# Patient Record
Sex: FEMALE | Race: WHITE | NOT HISPANIC OR LATINO | Employment: UNEMPLOYED | URBAN - METROPOLITAN AREA
[De-identification: names, ages, dates, MRNs, and addresses within clinical notes are randomized per-mention and may not be internally consistent; named-entity substitution may affect disease eponyms.]

---

## 2017-02-18 ENCOUNTER — OFFICE VISIT (OUTPATIENT)
Dept: URGENT CARE | Facility: CLINIC | Age: 62
End: 2017-02-18
Payer: COMMERCIAL

## 2017-02-18 PROCEDURE — 99213 OFFICE O/P EST LOW 20 MIN: CPT

## 2017-06-05 ENCOUNTER — ALLSCRIPTS OFFICE VISIT (OUTPATIENT)
Dept: OTHER | Facility: OTHER | Age: 62
End: 2017-06-05

## 2017-06-05 DIAGNOSIS — Z12.31 ENCOUNTER FOR SCREENING MAMMOGRAM FOR MALIGNANT NEOPLASM OF BREAST: ICD-10-CM

## 2017-06-05 DIAGNOSIS — I10 ESSENTIAL (PRIMARY) HYPERTENSION: ICD-10-CM

## 2018-01-14 VITALS
SYSTOLIC BLOOD PRESSURE: 140 MMHG | HEIGHT: 65 IN | HEART RATE: 90 BPM | WEIGHT: 258 LBS | BODY MASS INDEX: 42.99 KG/M2 | DIASTOLIC BLOOD PRESSURE: 80 MMHG

## 2018-01-18 NOTE — MISCELLANEOUS
Message  Return to work or school:   Ronaldo Griffith is under my professional care  She was seen in my office on 3/3/16   She is able to return to work on  In 1-2 days              Future Appointments    Signatures   Electronically signed by : Carl Pedraza MD; Mar  3 2016  1:31PM EST                       (Author)

## 2018-04-05 ENCOUNTER — HOSPITAL ENCOUNTER (INPATIENT)
Facility: HOSPITAL | Age: 63
LOS: 1 days | DRG: 281 | End: 2018-04-06
Attending: EMERGENCY MEDICINE | Admitting: HOSPITALIST
Payer: COMMERCIAL

## 2018-04-05 ENCOUNTER — OFFICE VISIT (OUTPATIENT)
Dept: URGENT CARE | Facility: CLINIC | Age: 63
End: 2018-04-05
Payer: COMMERCIAL

## 2018-04-05 ENCOUNTER — APPOINTMENT (EMERGENCY)
Dept: RADIOLOGY | Facility: HOSPITAL | Age: 63
DRG: 281 | End: 2018-04-05
Payer: COMMERCIAL

## 2018-04-05 VITALS
WEIGHT: 253 LBS | RESPIRATION RATE: 18 BRPM | BODY MASS INDEX: 42.15 KG/M2 | OXYGEN SATURATION: 98 % | SYSTOLIC BLOOD PRESSURE: 132 MMHG | HEART RATE: 68 BPM | TEMPERATURE: 97 F | HEIGHT: 65 IN | DIASTOLIC BLOOD PRESSURE: 78 MMHG

## 2018-04-05 DIAGNOSIS — R07.9 CHEST PAIN, UNSPECIFIED TYPE: Primary | ICD-10-CM

## 2018-04-05 DIAGNOSIS — I24.9 ACUTE CORONARY SYNDROME (HCC): Primary | ICD-10-CM

## 2018-04-05 DIAGNOSIS — I21.4 NSTEMI (NON-ST ELEVATED MYOCARDIAL INFARCTION) (HCC): ICD-10-CM

## 2018-04-05 PROBLEM — E66.01 MORBID OBESITY DUE TO EXCESS CALORIES (HCC): Status: ACTIVE | Noted: 2018-04-05

## 2018-04-05 PROBLEM — F41.9 ANXIETY: Status: ACTIVE | Noted: 2018-04-05

## 2018-04-05 PROBLEM — I10 ESSENTIAL HYPERTENSION: Status: ACTIVE | Noted: 2018-04-05

## 2018-04-05 LAB
ALBUMIN SERPL BCP-MCNC: 3.6 G/DL (ref 3.5–5)
ALP SERPL-CCNC: 87 U/L (ref 46–116)
ALT SERPL W P-5'-P-CCNC: 24 U/L (ref 12–78)
ANION GAP SERPL CALCULATED.3IONS-SCNC: 7 MMOL/L (ref 4–13)
APTT PPP: 31 SECONDS (ref 23–35)
AST SERPL W P-5'-P-CCNC: 17 U/L (ref 5–45)
ATRIAL RATE: 68 BPM
ATRIAL RATE: 74 BPM
BASOPHILS # BLD AUTO: 0.02 THOUSANDS/ΜL (ref 0–0.1)
BASOPHILS NFR BLD AUTO: 0 % (ref 0–1)
BILIRUB SERPL-MCNC: 0.3 MG/DL (ref 0.2–1)
BUN SERPL-MCNC: 18 MG/DL (ref 5–25)
CALCIUM SERPL-MCNC: 8.7 MG/DL (ref 8.3–10.1)
CHLORIDE SERPL-SCNC: 103 MMOL/L (ref 100–108)
CO2 SERPL-SCNC: 31 MMOL/L (ref 21–32)
CREAT SERPL-MCNC: 0.93 MG/DL (ref 0.6–1.3)
EOSINOPHIL # BLD AUTO: 0.09 THOUSAND/ΜL (ref 0–0.61)
EOSINOPHIL NFR BLD AUTO: 1 % (ref 0–6)
ERYTHROCYTE [DISTWIDTH] IN BLOOD BY AUTOMATED COUNT: 13.1 % (ref 11.6–15.1)
GFR SERPL CREATININE-BSD FRML MDRD: 67 ML/MIN/1.73SQ M
GLUCOSE SERPL-MCNC: 170 MG/DL (ref 65–140)
HCT VFR BLD AUTO: 42 % (ref 34.8–46.1)
HGB BLD-MCNC: 14.4 G/DL (ref 11.5–15.4)
INR PPP: 0.97 (ref 0.86–1.16)
LYMPHOCYTES # BLD AUTO: 0.79 THOUSANDS/ΜL (ref 0.6–4.47)
LYMPHOCYTES NFR BLD AUTO: 6 % (ref 14–44)
MCH RBC QN AUTO: 29.8 PG (ref 26.8–34.3)
MCHC RBC AUTO-ENTMCNC: 34.3 G/DL (ref 31.4–37.4)
MCV RBC AUTO: 87 FL (ref 82–98)
MONOCYTES # BLD AUTO: 0.59 THOUSAND/ΜL (ref 0.17–1.22)
MONOCYTES NFR BLD AUTO: 5 % (ref 4–12)
NEUTROPHILS # BLD AUTO: 11.58 THOUSANDS/ΜL (ref 1.85–7.62)
NEUTS SEG NFR BLD AUTO: 88 % (ref 43–75)
P AXIS: 29 DEGREES
P AXIS: 53 DEGREES
PLATELET # BLD AUTO: 200 THOUSANDS/UL (ref 149–390)
PMV BLD AUTO: 10.4 FL (ref 8.9–12.7)
POTASSIUM SERPL-SCNC: 3.6 MMOL/L (ref 3.5–5.3)
PR INTERVAL: 144 MS
PR INTERVAL: 150 MS
PROT SERPL-MCNC: 7.3 G/DL (ref 6.4–8.2)
PROTHROMBIN TIME: 12.7 SECONDS (ref 12.1–14.4)
QRS AXIS: -17 DEGREES
QRS AXIS: -17 DEGREES
QRSD INTERVAL: 88 MS
QRSD INTERVAL: 96 MS
QT INTERVAL: 394 MS
QT INTERVAL: 410 MS
QTC INTERVAL: 435 MS
QTC INTERVAL: 437 MS
RBC # BLD AUTO: 4.84 MILLION/UL (ref 3.81–5.12)
SODIUM SERPL-SCNC: 141 MMOL/L (ref 136–145)
T WAVE AXIS: 45 DEGREES
T WAVE AXIS: 6 DEGREES
TROPONIN I SERPL-MCNC: 0.04 NG/ML
TROPONIN I SERPL-MCNC: 1.01 NG/ML
TROPONIN I SERPL-MCNC: 3.33 NG/ML
TROPONIN I SERPL-MCNC: 4.95 NG/ML
VENTRICULAR RATE: 68 BPM
VENTRICULAR RATE: 74 BPM
WBC # BLD AUTO: 13.07 THOUSAND/UL (ref 4.31–10.16)

## 2018-04-05 PROCEDURE — 99213 OFFICE O/P EST LOW 20 MIN: CPT | Performed by: FAMILY MEDICINE

## 2018-04-05 PROCEDURE — 85610 PROTHROMBIN TIME: CPT | Performed by: EMERGENCY MEDICINE

## 2018-04-05 PROCEDURE — 96375 TX/PRO/DX INJ NEW DRUG ADDON: CPT

## 2018-04-05 PROCEDURE — 99223 1ST HOSP IP/OBS HIGH 75: CPT | Performed by: HOSPITALIST

## 2018-04-05 PROCEDURE — 85730 THROMBOPLASTIN TIME PARTIAL: CPT | Performed by: HOSPITALIST

## 2018-04-05 PROCEDURE — 84484 ASSAY OF TROPONIN QUANT: CPT | Performed by: HOSPITALIST

## 2018-04-05 PROCEDURE — 36415 COLL VENOUS BLD VENIPUNCTURE: CPT

## 2018-04-05 PROCEDURE — 84484 ASSAY OF TROPONIN QUANT: CPT

## 2018-04-05 PROCEDURE — 85730 THROMBOPLASTIN TIME PARTIAL: CPT | Performed by: EMERGENCY MEDICINE

## 2018-04-05 PROCEDURE — 71046 X-RAY EXAM CHEST 2 VIEWS: CPT

## 2018-04-05 PROCEDURE — 80053 COMPREHEN METABOLIC PANEL: CPT

## 2018-04-05 PROCEDURE — 93005 ELECTROCARDIOGRAM TRACING: CPT | Performed by: FAMILY MEDICINE

## 2018-04-05 PROCEDURE — 99285 EMERGENCY DEPT VISIT HI MDM: CPT

## 2018-04-05 PROCEDURE — 84484 ASSAY OF TROPONIN QUANT: CPT | Performed by: EMERGENCY MEDICINE

## 2018-04-05 PROCEDURE — 96374 THER/PROPH/DIAG INJ IV PUSH: CPT

## 2018-04-05 PROCEDURE — 85025 COMPLETE CBC W/AUTO DIFF WBC: CPT

## 2018-04-05 PROCEDURE — 93005 ELECTROCARDIOGRAM TRACING: CPT

## 2018-04-05 PROCEDURE — 93005 ELECTROCARDIOGRAM TRACING: CPT | Performed by: HOSPITALIST

## 2018-04-05 RX ORDER — TRAMADOL HYDROCHLORIDE 50 MG/1
50 TABLET ORAL EVERY 6 HOURS PRN
COMMUNITY
End: 2021-04-14

## 2018-04-05 RX ORDER — DULOXETIN HYDROCHLORIDE 20 MG/1
80 CAPSULE, DELAYED RELEASE ORAL DAILY
COMMUNITY
Start: 2018-03-24

## 2018-04-05 RX ORDER — SODIUM CHLORIDE 9 MG/ML
75 INJECTION, SOLUTION INTRAVENOUS CONTINUOUS
Status: DISCONTINUED | OUTPATIENT
Start: 2018-04-05 | End: 2018-04-06 | Stop reason: HOSPADM

## 2018-04-05 RX ORDER — METOPROLOL SUCCINATE 100 MG/1
100 TABLET, EXTENDED RELEASE ORAL DAILY
COMMUNITY
Start: 2018-03-24

## 2018-04-05 RX ORDER — HEPARIN SODIUM 10000 [USP'U]/100ML
11.1 INJECTION, SOLUTION INTRAVENOUS
Status: DISCONTINUED | OUTPATIENT
Start: 2018-04-05 | End: 2018-04-06 | Stop reason: HOSPADM

## 2018-04-05 RX ORDER — METOPROLOL SUCCINATE 50 MG/1
100 TABLET, EXTENDED RELEASE ORAL DAILY
Status: DISCONTINUED | OUTPATIENT
Start: 2018-04-06 | End: 2018-04-06 | Stop reason: HOSPADM

## 2018-04-05 RX ORDER — ASPIRIN 81 MG/1
324 TABLET, CHEWABLE ORAL ONCE
Status: COMPLETED | OUTPATIENT
Start: 2018-04-05 | End: 2018-04-05

## 2018-04-05 RX ORDER — ATORVASTATIN CALCIUM 40 MG/1
40 TABLET, FILM COATED ORAL EVERY EVENING
Status: DISCONTINUED | OUTPATIENT
Start: 2018-04-05 | End: 2018-04-06 | Stop reason: HOSPADM

## 2018-04-05 RX ORDER — HEPARIN SODIUM 1000 [USP'U]/ML
2000 INJECTION, SOLUTION INTRAVENOUS; SUBCUTANEOUS AS NEEDED
Status: DISCONTINUED | OUTPATIENT
Start: 2018-04-05 | End: 2018-04-06 | Stop reason: HOSPADM

## 2018-04-05 RX ORDER — DULOXETIN HYDROCHLORIDE 20 MG/1
20 CAPSULE, DELAYED RELEASE ORAL DAILY
Status: DISCONTINUED | OUTPATIENT
Start: 2018-04-06 | End: 2018-04-06 | Stop reason: HOSPADM

## 2018-04-05 RX ORDER — ONDANSETRON 2 MG/ML
4 INJECTION INTRAMUSCULAR; INTRAVENOUS ONCE
Status: COMPLETED | OUTPATIENT
Start: 2018-04-06 | End: 2018-04-06

## 2018-04-05 RX ORDER — HEPARIN SODIUM 1000 [USP'U]/ML
4000 INJECTION, SOLUTION INTRAVENOUS; SUBCUTANEOUS ONCE
Status: COMPLETED | OUTPATIENT
Start: 2018-04-05 | End: 2018-04-05

## 2018-04-05 RX ORDER — MORPHINE SULFATE 4 MG/ML
4 INJECTION, SOLUTION INTRAMUSCULAR; INTRAVENOUS EVERY 4 HOURS PRN
Status: DISCONTINUED | OUTPATIENT
Start: 2018-04-05 | End: 2018-04-06 | Stop reason: HOSPADM

## 2018-04-05 RX ORDER — KETOROLAC TROMETHAMINE 30 MG/ML
30 INJECTION, SOLUTION INTRAMUSCULAR; INTRAVENOUS ONCE
Status: COMPLETED | OUTPATIENT
Start: 2018-04-05 | End: 2018-04-05

## 2018-04-05 RX ORDER — PANTOPRAZOLE SODIUM 20 MG/1
20 TABLET, DELAYED RELEASE ORAL
Status: DISCONTINUED | OUTPATIENT
Start: 2018-04-06 | End: 2018-04-06 | Stop reason: HOSPADM

## 2018-04-05 RX ORDER — LISINOPRIL AND HYDROCHLOROTHIAZIDE 25; 20 MG/1; MG/1
1 TABLET ORAL DAILY
COMMUNITY
Start: 2018-03-24 | End: 2018-04-07 | Stop reason: HOSPADM

## 2018-04-05 RX ORDER — OXYCODONE HYDROCHLORIDE 5 MG/1
5 TABLET ORAL EVERY 4 HOURS PRN
Status: DISCONTINUED | OUTPATIENT
Start: 2018-04-05 | End: 2018-04-06 | Stop reason: HOSPADM

## 2018-04-05 RX ORDER — MAGNESIUM HYDROXIDE/ALUMINUM HYDROXICE/SIMETHICONE 120; 1200; 1200 MG/30ML; MG/30ML; MG/30ML
30 SUSPENSION ORAL ONCE
Status: COMPLETED | OUTPATIENT
Start: 2018-04-05 | End: 2018-04-05

## 2018-04-05 RX ORDER — HEPARIN SODIUM 1000 [USP'U]/ML
4000 INJECTION, SOLUTION INTRAVENOUS; SUBCUTANEOUS AS NEEDED
Status: DISCONTINUED | OUTPATIENT
Start: 2018-04-05 | End: 2018-04-06 | Stop reason: HOSPADM

## 2018-04-05 RX ORDER — OMEPRAZOLE 20 MG/1
1 TABLET, DELAYED RELEASE ORAL DAILY
COMMUNITY

## 2018-04-05 RX ORDER — ASPIRIN 325 MG
325 TABLET ORAL ONCE
Status: DISCONTINUED | OUTPATIENT
Start: 2018-04-05 | End: 2018-04-06 | Stop reason: HOSPADM

## 2018-04-05 RX ORDER — ACETAMINOPHEN 325 MG/1
650 TABLET ORAL EVERY 4 HOURS PRN
Status: DISCONTINUED | OUTPATIENT
Start: 2018-04-05 | End: 2018-04-06 | Stop reason: HOSPADM

## 2018-04-05 RX ADMIN — ASPIRIN 81 MG 324 MG: 81 TABLET ORAL at 16:56

## 2018-04-05 RX ADMIN — ALUMINUM HYDROXIDE, MAGNESIUM HYDROXIDE, AND SIMETHICONE 30 ML: 200; 200; 20 SUSPENSION ORAL at 14:08

## 2018-04-05 RX ADMIN — NITROGLYCERIN 0.5 INCH: 20 OINTMENT TOPICAL at 16:57

## 2018-04-05 RX ADMIN — LIDOCAINE HYDROCHLORIDE 10 ML: 20 SOLUTION ORAL; TOPICAL at 14:08

## 2018-04-05 RX ADMIN — KETOROLAC TROMETHAMINE 30 MG: 30 INJECTION, SOLUTION INTRAMUSCULAR; INTRAVENOUS at 12:53

## 2018-04-05 RX ADMIN — SODIUM CHLORIDE 75 ML/HR: 0.9 INJECTION, SOLUTION INTRAVENOUS at 18:46

## 2018-04-05 RX ADMIN — FAMOTIDINE 20 MG: 10 INJECTION, SOLUTION INTRAVENOUS at 14:07

## 2018-04-05 RX ADMIN — HEPARIN SODIUM 4000 UNITS: 1000 INJECTION, SOLUTION INTRAVENOUS; SUBCUTANEOUS at 16:57

## 2018-04-05 RX ADMIN — HEPARIN SODIUM AND DEXTROSE 11.1 UNITS/KG/HR: 10000; 5 INJECTION INTRAVENOUS at 16:58

## 2018-04-05 RX ADMIN — ACETAMINOPHEN 650 MG: 325 TABLET ORAL at 21:21

## 2018-04-05 RX ADMIN — ATORVASTATIN CALCIUM 40 MG: 40 TABLET, FILM COATED ORAL at 18:14

## 2018-04-05 NOTE — ED NOTES
Patient states her pain has not changed, #8 on scale   [address] aware       Oscar Sandra, AJAY  04/05/18 6697

## 2018-04-05 NOTE — H&P
H&P- Nathaly Ni 1955, 65 y o  female MRN: [id number]    Unit/Bed#: ED 04 B Encounter: [id number]    Primary Care Provider: Anais Rios DO   Date and time admitted to hospital: 4/5/2018 12:10 PM        Anxiety   Assessment & Plan    -continue Cymbalta        Morbid obesity due to excess calories ([address] )   Assessment & Plan    -encourage weight loss        Essential hypertension   Assessment & Plan    -continue lisinopril, HCTZ, Lopressor        * NSTEMI (non-ST elevated myocardial infarction) (HCC)   Assessment & Plan    -continue aspirin started in the ER  -continue beta-blocker  -start statin  -continue to trend troponin  -monitor on telemetry  -continue heparin infusion that is being started in the ER  -consult Cardiology          VTE Prophylaxis: Heparin Drip  / reason for no mechanical VTE prophylaxis On heparin drip   Code Status:  Full  POLST: POLST is not applicable to this patient  Discussion with family:   at bedside    Anticipated Length of Stay:  Patient will be admitted on an Inpatient basis with an anticipated length of stay of  > 2 midnights  Justification for Hospital Stay:  NSTEMI    Total Time for Visit, including Counseling / Coordination of Care: 30 minutes  Greater than 50% of this total time spent on direct patient counseling and coordination of care  Chief Complaint:   Chest pain    History of Present Illness:    Nathaly Ni is a 65 y o  female who presents with a chief complaint of chest pain  Earlier this morning the patient was outside with her dog  She came in and sat down on the sofa began feeling retrosternal sharp constant chest pain that radiated to her right chest   She said the pain persisted without interruption until she received Maalox and viscous lidocaine in the ER  She denies any associated shortness of breath, nausea, vomiting    She does report having some diaphoresis at home but also felt that the heat was turned up to high in her house  Patient denies palpitations, cough, diarrhea, abdominal pain, fevers, chills, night sweats, headache, visual disturbances, neck stiffness, new focal neurologic deficit, rash, dysuria, heat or cold intolerance, significant weight gain or loss  Review of Systems:    Review of Systems   All other systems reviewed and are negative  Past Medical and Surgical History:     Past Medical History:   Diagnosis Date    Anxiety     GERD (gastroesophageal reflux disease)     Hypertension        Past Surgical History:   Procedure Laterality Date    BACK SURGERY      HYSTERECTOMY         Meds/Allergies:    Prior to Admission medications    Medication Sig Start Date End Date Taking? Authorizing Provider   DULoxetine (CYMBALTA) 20 mg capsule  3/24/18  Yes Historical Provider, MD   lisinopril-hydrochlorothiazide (PRINZIDE,ZESTORETIC) 20-25 MG per tablet  3/24/18  Yes Historical Provider, MD   metoprolol succinate (TOPROL-XL) 100 mg 24 hr tablet  3/24/18  Yes Historical Provider, MD   omeprazole (PRILOSEC OTC) 20 MG tablet Take 1 tablet by mouth daily   Yes Historical Provider, MD     I have reviewed home medications with patient personally      Allergies: No Known Allergies    Social History:     Marital Status: /Civil Union   Substance Use History:   History   Alcohol Use No     History   Smoking Status    Never Smoker   Smokeless Tobacco    Never Used     History   Drug Use No       Family History:    non-contributory    Physical Exam:     Vitals:   Blood Pressure: 141/65 (04/05/18 1630)  Pulse: 63 (04/05/18 1630)  Temperature: (!) 96 8 °F (36 °C) (04/05/18 1202)  Temp Source: Temporal (04/05/18 1202)  Respirations: 20 (04/05/18 1630)  Weight - Scale: 115 kg (253 lb) (04/05/18 1202)  SpO2: 96 % (04/05/18 1630)    Physical Exam    Gen: NAD, AAOx3, well developed, well nourished  Eyes: EOMI, PERRLA, no scleral icterus  ENMT:  Oropharynx clear of erythema or exudates, no nasal discharge, no otic discharge, moist mucous membranes  Neck:  Supple  Lymph:  No anterior or posterior cervical or supraclavicular lymphadenopathy  Cardiovascular:  Regular rate and rhythm, normal S1-S2, no murmurs, rubs, or gallops  Lungs:  Clear to auscultation bilaterally, no wheezes, or rales, or rhonchi  Abdomen:  Positive bowel sounds, soft, nontender, nondistended, no palpable organomegaly   Skin:  Intact, no obvious lesions or rashes, no edema  Neuro: Cranial nerves 2-12 are intact, non-focal, 5/5 strength in all 4 extremities      Additional Data:     Lab Results: I have personally reviewed pertinent reports  Results from last 7 days  Lab Units 04/05/18  1214   WBC Thousand/uL 13 07*   HEMOGLOBIN g/dL 14 4   HEMATOCRIT % 42 0   PLATELETS Thousands/uL 200   NEUTROS PCT % 88*   LYMPHS PCT % 6*   MONOS PCT % 5   EOS PCT % 1       Results from last 7 days  Lab Units 04/05/18  1214   SODIUM mmol/L 141   POTASSIUM mmol/L 3 6   CHLORIDE mmol/L 103   CO2 mmol/L 31   BUN mg/dL 18   CREATININE mg/dL 0 93   CALCIUM mg/dL 8 7   TOTAL PROTEIN g/dL 7 3   BILIRUBIN TOTAL mg/dL 0 30   ALK PHOS U/L 87   ALT U/L 24   AST U/L 17   GLUCOSE RANDOM mg/dL 170*           Imaging: I have personally reviewed pertinent reports  X-ray chest 2 views   ED Interpretation by Juan Gudino DO (04/05 1237)   See below      Final Result by Christopher Jean MD (04/05 1232)      No acute cardiopulmonary disease  Workstation performed: [id number]             EKG, Pathology, and Other Studies Reviewed on Admission:   · EKG:  Sinus rhythm at a rate of 65  Left axis deviation  No ST changes  T-wave flattening in lead III, T-wave inversions in leads V1 through V3  Allscripts / Epic Records Reviewed: Yes     ** Please Note: This note has been constructed using a voice recognition system   **

## 2018-04-05 NOTE — ED PROVIDER NOTES
History  Chief Complaint   Patient presents with    Chest Pain     pt stated that the chest pain started at 10am this morning, pt stated that she had just brought her dog in and it started, no SOB     Playing with dog outside this am and noted chest pain / pressure  Felt mild dyspnea, nausea and was sweaty  Came inside to rest and pain eventually resolved  Lasted about 30-45 minutes total   occas cp since then  No known hx of cad   _+hx of htn  +FH of early CAD  Denies recent illness, no f/c/ no cough or congestion  Appetite normal,no le pain or swelling  No h xof dvt/pe  No recent travel, surg, immob         History provided by:  Patient and spouse   used: No    Chest Pain   Pain location:  Substernal area  Pain quality: pressure    Pain radiates to:  Does not radiate  Pain radiates to the back: no    Pain severity:  Moderate  Onset quality:  Sudden  Duration:  45 minutes  Timing:  Intermittent  Progression:  Resolved  Chronicity:  New  Context: not at rest    Relieved by:  Nothing  Worsened by:  Nothing tried  Ineffective treatments:  None tried  Associated symptoms: diaphoresis, nausea and shortness of breath    Associated symptoms: no abdominal pain, no anorexia, no back pain, no cough, no dizziness, no fatigue, no fever, no headache, no lower extremity edema, no numbness, no palpitations and not vomiting    Risk factors: hypertension and obesity    Risk factors: no prior DVT/PE        Prior to Admission Medications   Prescriptions Last Dose Informant Patient Reported? Taking?    DULoxetine (CYMBALTA) 20 mg capsule 4/5/2018 at Unknown time  Yes Yes   Sig: daily     metoprolol succinate (TOPROL-XL) 100 mg 24 hr tablet 4/5/2018 at Unknown time  Yes Yes   omeprazole (PRILOSEC OTC) 20 MG tablet 4/5/2018 at Unknown time  Yes Yes   Sig: Take 1 tablet by mouth daily   traMADol (ULTRAM) 50 mg tablet Unknown at Unknown time  Yes No   Sig: Take 50 mg by mouth every 6 (six) hours as needed for moderate pain (for migrain pain)      Facility-Administered Medications: None       Past Medical History:   Diagnosis Date    Anxiety     GERD (gastroesophageal reflux disease)     Hypertension     Migraine        Past Surgical History:   Procedure Laterality Date    BACK SURGERY      HYSTERECTOMY      LUMBAR DISCECTOMY         History reviewed  No pertinent family history  I have reviewed and agree with the history as documented  Social History   Substance Use Topics    Smoking status: Never Smoker    Smokeless tobacco: Never Used    Alcohol use Yes      Comment: occassionally        Review of Systems   Constitutional: Positive for diaphoresis  Negative for fatigue and fever  Respiratory: Positive for shortness of breath  Negative for cough  Cardiovascular: Positive for chest pain  Negative for palpitations  Gastrointestinal: Positive for nausea  Negative for abdominal pain, anorexia and vomiting  Musculoskeletal: Negative for back pain  Neurological: Negative for dizziness, numbness and headaches  All other systems reviewed and are negative  Physical Exam  ED Triage Vitals   Temperature Pulse Respirations Blood Pressure SpO2   04/05/18 1202 04/05/18 1202 04/05/18 1202 04/05/18 1202 04/05/18 1202   (!) 96 8 °F (36 °C) 73 16 (!) 186/91 98 %      Temp Source Heart Rate Source Patient Position - Orthostatic VS BP Location FiO2 (%)   04/05/18 1202 04/05/18 1202 04/05/18 1202 04/05/18 1202 --   Temporal Monitor Lying Right arm       Pain Score       04/05/18 1200       9           Orthostatic Vital Signs  Vitals:    04/05/18 1715 04/05/18 1734 04/05/18 2339 04/06/18 0829   BP: 148/77 160/95 114/62 116/59   Pulse: 69 75 73 72   Patient Position - Orthostatic VS: Lying  Lying Lying       Physical Exam   Constitutional: She appears well-developed and well-nourished  Morbidly obese w/ BMI 42 5   HENT:   Nose: Nose normal    Eyes: Conjunctivae are normal    Neck: Neck supple  Cardiovascular: Normal rate and regular rhythm  No murmur heard  Pulmonary/Chest: Effort normal and breath sounds normal  No respiratory distress  Abdominal: Soft  Musculoskeletal: She exhibits no edema, tenderness or deformity  Neurological: She is alert  Skin: Skin is warm  Psychiatric: She has a normal mood and affect  Nursing note and vitals reviewed        ED Medications  Medications   ketorolac (TORADOL) injection 30 mg (30 mg Intravenous Given 4/5/18 1253)   famotidine (PEPCID) injection 20 mg (20 mg Intravenous Given 4/5/18 1407)   aluminum-magnesium hydroxide-simethicone (MYLANTA) 200-200-20 mg/5 mL oral suspension 30 mL (30 mL Oral Given 4/5/18 1408)   lidocaine viscous (XYLOCAINE) 2 % mucosal solution 10 mL (10 mL Swish & Spit Given 4/5/18 1408)   aspirin chewable tablet 324 mg (324 mg Oral Given 4/5/18 1656)   nitroglycerin (NITRO-BID) 2 % TD ointment 0 5 inch (0 5 inches Topical Given 4/5/18 1657)   heparin (porcine) injection 4,000 Units (4,000 Units Intravenous Given 4/5/18 1657)   ondansetron (ZOFRAN) injection 4 mg (4 mg Intravenous Given 4/6/18 0903)       Diagnostic Studies  Results Reviewed     Procedure Component Value Units Date/Time    APTT six (6) hours after Heparin bolus/drip initiation or dosing change [[id number]]  (Normal) Collected:  04/05/18 1658    Lab Status:  Final result Specimen:  Blood from Arm, Right Updated:  04/05/18 1750     PTT 31 seconds     Narrative:       SLIGHT HEMOLYSIS    Therapeutic Heparin Range = 60-90 seconds    Protime-INR [[id number]]  (Normal) Collected:  04/05/18 1658    Lab Status:  Final result Specimen:  Blood from Arm, Right Updated:  04/05/18 1750     Protime 12 7 seconds      INR 0 97    Narrative:       SLIGHT HEMOLYSIS    Troponin I [[id number]]  (Abnormal) Collected:  04/05/18 1539    Lab Status:  Final result Specimen:  Blood from Arm, Left Updated:  04/05/18 1620     Troponin I 1 01 (H) ng/mL     Narrative:         [employer] analyzer 99% cutoff is > 0 04 ng/mL in network labs    o cTnI 99% cutoff is useful only when applied to patients in the clinical setting of myocardial ischemia  o cTnI 99% cutoff should be interpreted in the context of clinical history, ECG findings and possibly cardiac imaging to establish correct diagnosis  o cTnI 99% cutoff may be suggestive but clearly not indicative of a coronary event without the clinical setting of myocardial ischemia  Troponin I [[id number]]  (Normal) Collected:  04/05/18 1214    Lab Status:  Final result Specimen:  Blood from Arm, Right Updated:  04/05/18 1251     Troponin I 0 04 ng/mL     Narrative:         [employer] Chemistry analyzer 99% cutoff is > 0 04 ng/mL in network labs    o cTnI 99% cutoff is useful only when applied to patients in the clinical setting of myocardial ischemia  o cTnI 99% cutoff should be interpreted in the context of clinical history, ECG findings and possibly cardiac imaging to establish correct diagnosis  o cTnI 99% cutoff may be suggestive but clearly not indicative of a coronary event without the clinical setting of myocardial ischemia  Comprehensive metabolic panel [[id number]]  (Abnormal) Collected:  04/05/18 1214    Lab Status:  Final result Specimen:  Blood from Arm, Right Updated:  04/05/18 1248     Sodium 141 mmol/L      Potassium 3 6 mmol/L      Chloride 103 mmol/L      CO2 31 mmol/L      Anion Gap 7 mmol/L      BUN 18 mg/dL      Creatinine 0 93 mg/dL      Glucose 170 (H) mg/dL      Calcium 8 7 mg/dL      AST 17 U/L      ALT 24 U/L      Alkaline Phosphatase 87 U/L      Total Protein 7 3 g/dL      Albumin 3 6 g/dL      Total Bilirubin 0 30 mg/dL      eGFR 67 ml/min/1 73sq m     Narrative:         National Kidney Disease Education Program recommendations are as follows:  GFR calculation is accurate only with a steady state creatinine  Chronic Kidney disease less than 60 ml/min/1 73 sq  meters  Kidney failure less than 15 ml/min/1 73 sq  meters      CBC and differential [[id number]]  (Abnormal) Collected:  04/05/18 1214    Lab Status:  Final result Specimen:  Blood from Arm, Right Updated:  04/05/18 1228     WBC 13 07 (H) Thousand/uL      RBC 4 84 Million/uL      Hemoglobin 14 4 g/dL      Hematocrit 42 0 %      MCV 87 fL      MCH 29 8 pg      MCHC 34 3 g/dL      RDW 13 1 %      MPV 10 4 fL      Platelets 395 Thousands/uL      Neutrophils Relative 88 (H) %      Lymphocytes Relative 6 (L) %      Monocytes Relative 5 %      Eosinophils Relative 1 %      Basophils Relative 0 %      Neutrophils Absolute 11 58 (H) Thousands/µL      Lymphocytes Absolute 0 79 Thousands/µL      Monocytes Absolute 0 59 Thousand/µL      Eosinophils Absolute 0 09 Thousand/µL      Basophils Absolute 0 02 Thousands/µL                  X-ray chest 2 views   ED Interpretation by Romy Hutton DO (04/05 1237)   See below      Final Result by Elodia Slaughter MD (04/05 1232)      No acute cardiopulmonary disease              Workstation performed: [id number]                    Procedures  ECG 12 Lead Documentation  Date/Time: 4/5/2018 12:05 PM  Performed by: Govind Deluca by: Tyesha Chen     ECG reviewed by me, the ED Provider: yes    Patient location:  ED  Previous ECG:     Previous ECG:  Unavailable  Interpretation:     Interpretation: normal    Rate:     ECG rate:  74    ECG rate assessment: normal    Rhythm:     Rhythm: sinus rhythm    Ectopy:     Ectopy: none    QRS:     QRS axis:  Left  ST segments:     ST segments:  Non-specific  T waves:     T waves: non-specific      ECG 12 Lead Documentation  Date/Time: 4/5/2018 3:42 PM  Performed by: Tyesha Chen  Authorized by: Tyesha Chen     ECG reviewed by me, the ED Provider: yes    Patient location:  ED  Interpretation:     Interpretation: normal    Rate:     ECG rate:  65    ECG rate assessment: normal    Rhythm:     Rhythm: sinus rhythm    Ectopy:     Ectopy: none    QRS:     QRS axis:  Left  ST segments:     ST segments:  Non-specific  T waves:     T waves: non-specific             Phone Contacts  ED Phone Contact    ED Course  ED Course as of Apr 08 0909   Thu Apr 05, 2018   1332 HEART score 3   Will hold for 2nd troponin at [address] Most Recent Value   History  0 Filed at: 04/05/2018 1319   ECG  1 Filed at: 04/05/2018 1319   Age  1 Filed at: 04/05/2018 1319   Risk Factors  1 Filed at: 04/05/2018 1319   Troponin  0 Filed at: 04/05/2018 1319   Heart Score Risk Calculator   History  0 Filed at: 04/05/2018 1319   ECG  1 Filed at: 04/05/2018 1319   Age  1 Filed at: 04/05/2018 1319   Risk Factors  1 Filed at: 04/05/2018 1319   Troponin  0 Filed at: 04/05/2018 1319   HEART Score  3 Filed at: 04/05/2018 1319   HEART Score  3 Filed at: 04/05/2018 1319                            MDM  Number of Diagnoses or Management Options  Acute coronary syndrome [facility]): new and requires workup     Amount and/or Complexity of Data Reviewed  Clinical lab tests: reviewed and ordered  Tests in the radiology section of CPT®: reviewed and ordered  Tests in the medicine section of CPT®: ordered and reviewed  Obtain history from someone other than the patient: yes  Independent visualization of images, tracings, or specimens: yes      CritCare Time    Disposition  Final diagnoses:   Acute coronary syndrome ([facility] )     Time reflects when diagnosis was documented in both MDM as applicable and the Disposition within this note     Time User Action Codes Description Comment    4/5/2018  4:39 PM Maggie Nicole Add [I24 9] Acute coronary syndrome ([facility] )     4/5/2018  5:21 PM [address] M Add [I21 4] NSTEMI (non-ST elevated myocardial infarction) ([facility] )     4/5/2018  5:21 PM [address] M Modify [I21 4] NSTEMI (non-ST elevated myocardial infarction) [facility])       ED Disposition     ED Disposition Condition Comment    Admit  Case was discussed with Dr Triny David and the patient's admission status was agreed to be Admission Status: inpatient status to the service of Dr Rj Gonzalez   Follow-up Information     Follow up With Specialties Details Why Cha Adrian MD Cardiology Follow up the office will call for an appt  [address] [place] [address]          Discharge Medication List as of 4/6/2018  2:37 PM      CONTINUE these medications which have NOT CHANGED    Details   DULoxetine (CYMBALTA) 20 mg capsule daily  , Starting Sat 3/24/2018, Historical Med      metoprolol succinate (TOPROL-XL) 100 mg 24 hr tablet Starting Sat 3/24/2018, Historical Med      omeprazole (PRILOSEC OTC) 20 MG tablet Take 1 tablet by mouth daily, Historical Med      lisinopril-hydrochlorothiazide (PRINZIDE,ZESTORETIC) 20-25 MG per tablet Take 1 tablet by mouth daily  , Starting Sat 3/24/2018, Historical Med      traMADol (ULTRAM) 50 mg tablet Take 50 mg by mouth every 6 (six) hours as needed for moderate pain (for migrain pain), Historical Med           No discharge procedures on file      ED Provider  Electronically Signed by           Daria Romo DO  04/08/18 6377

## 2018-04-05 NOTE — ED NOTES
Patient states her pain is all gone and is sitting up in bed smiling  States "you saved my life" "I feel great"       Otoniel Yen RN  04/05/18 9778

## 2018-04-05 NOTE — PROGRESS NOTES
Assessment/Plan:      Diagnoses and all orders for this visit:    Chest pain, unspecified type  -     POCT ECG    Other orders  -     lisinopril-hydrochlorothiazide (PRINZIDE,ZESTORETIC) 20-25 MG per tablet;   -     metoprolol succinate (TOPROL-XL) 100 mg 24 hr tablet;   -     omeprazole (PRILOSEC OTC) 20 MG tablet; Take 1 tablet by mouth daily  -     DULoxetine (CYMBALTA) 20 mg capsule;           Subjective:     Patient ID: Maryellen Albarran is a 65 y o  female  Patient is a 27-year-old female nonsmoker  She is hypertensive lisinopril, and Metoprolol  Father  at the age of 79 of a heart attack  She denies any problems with cholesterol  Early this morning she began to complain of some tightness in the center of her chest   She has got some discomfort in her back arms which she blames on gardening this last weekend  The discomfort has been fairly persistent  She is not short breath  She cares for her elderly mother who has Alzheimer's  This is an obvious cause of stress  An EKG done here today fail to reveal any acute changes  She does have poor R-wave progression to be 3  However this is likely breast artifact  Review of Systems   Respiratory: Positive for chest tightness  All other systems reviewed and are negative  Objective:     Physical Exam   Constitutional: She is oriented to person, place, and time  She appears well-developed and well-nourished  HENT:   Head: Normocephalic and atraumatic  Eyes: Conjunctivae and EOM are normal    Neck: Normal range of motion  Cardiovascular: Normal rate, regular rhythm and normal heart sounds  Pulmonary/Chest: Effort normal and breath sounds normal    Neurological: She is alert and oriented to person, place, and time  Skin: Skin is warm  Psychiatric: She has a normal mood and affect

## 2018-04-05 NOTE — PATIENT INSTRUCTIONS
As we discussed, please get to the emergency room now for further evaluation  A normal EKG does not rule out of a cardiac issue

## 2018-04-05 NOTE — ASSESSMENT & PLAN NOTE
-continue aspirin started in the ER  -continue beta-blocker  -start statin  -continue to trend troponin  -monitor on telemetry  -continue heparin infusion that is being started in the ER  -consult Cardiology

## 2018-04-06 ENCOUNTER — APPOINTMENT (OUTPATIENT)
Dept: NON INVASIVE DIAGNOSTICS | Facility: HOSPITAL | Age: 63
DRG: 281 | End: 2018-04-06
Attending: INTERNAL MEDICINE
Payer: COMMERCIAL

## 2018-04-06 ENCOUNTER — HOSPITAL ENCOUNTER (INPATIENT)
Facility: HOSPITAL | Age: 63
LOS: 1 days | Discharge: HOME | DRG: 281 | End: 2018-04-07
Attending: INTERNAL MEDICINE | Admitting: INTERNAL MEDICINE
Payer: COMMERCIAL

## 2018-04-06 ENCOUNTER — APPOINTMENT (INPATIENT)
Dept: NON INVASIVE DIAGNOSTICS | Facility: HOSPITAL | Age: 63
DRG: 281 | End: 2018-04-06
Payer: COMMERCIAL

## 2018-04-06 VITALS
TEMPERATURE: 98.2 F | BODY MASS INDEX: 42.79 KG/M2 | OXYGEN SATURATION: 96 % | HEIGHT: 65 IN | WEIGHT: 256.84 LBS | HEART RATE: 72 BPM | DIASTOLIC BLOOD PRESSURE: 59 MMHG | RESPIRATION RATE: 18 BRPM | SYSTOLIC BLOOD PRESSURE: 116 MMHG

## 2018-04-06 DIAGNOSIS — I21.4 NSTEMI (NON-ST ELEVATED MYOCARDIAL INFARCTION) (HCC): ICD-10-CM

## 2018-04-06 LAB
ALBUMIN SERPL BCP-MCNC: 3.3 G/DL (ref 3.5–5)
ALP SERPL-CCNC: 81 U/L (ref 46–116)
ALT SERPL W P-5'-P-CCNC: 27 U/L (ref 12–78)
ANION GAP SERPL CALCULATED.3IONS-SCNC: 10 MMOL/L (ref 4–13)
APTT PPP: 49 SECONDS (ref 23–35)
APTT PPP: 53 SECONDS (ref 23–35)
APTT PPP: 60 SECONDS (ref 23–35)
AST SERPL W P-5'-P-CCNC: 72 U/L (ref 5–45)
ATRIAL RATE: 63 BPM
ATRIAL RATE: 65 BPM
ATRIAL RATE: 70 BPM
ATRIAL RATE: 73 BPM
BILIRUB SERPL-MCNC: 0.6 MG/DL (ref 0.2–1)
BUN SERPL-MCNC: 18 MG/DL (ref 5–25)
CALCIUM SERPL-MCNC: 8.8 MG/DL (ref 8.3–10.1)
CHLORIDE SERPL-SCNC: 104 MMOL/L (ref 100–108)
CHOLEST SERPL-MCNC: 211 MG/DL (ref 50–200)
CO2 SERPL-SCNC: 26 MMOL/L (ref 21–32)
CREAT SERPL-MCNC: 0.88 MG/DL (ref 0.6–1.3)
GFR SERPL CREATININE-BSD FRML MDRD: 71 ML/MIN/1.73SQ M
GLUCOSE SERPL-MCNC: 135 MG/DL (ref 65–140)
HDLC SERPL-MCNC: 47 MG/DL (ref 40–60)
LDLC SERPL CALC-MCNC: 127 MG/DL (ref 0–100)
NONHDLC SERPL-MCNC: 164 MG/DL
P AXIS: 15 DEGREES
P AXIS: 23 DEGREES
P AXIS: 35 DEGREES
POTASSIUM SERPL-SCNC: 3.7 MMOL/L (ref 3.5–5.3)
PR INTERVAL: 146 MS
PR INTERVAL: 146 MS
PR INTERVAL: 148 MS
PR INTERVAL: 154 MS
PROT SERPL-MCNC: 6.5 G/DL (ref 6.4–8.2)
QRS AXIS: -26 DEGREES
QRS AXIS: -29 DEGREES
QRS AXIS: -32 DEGREES
QRS AXIS: 204 DEGREES
QRSD INTERVAL: 78 MS
QRSD INTERVAL: 84 MS
QRSD INTERVAL: 86 MS
QRSD INTERVAL: 90 MS
QT INTERVAL: 412 MS
QT INTERVAL: 418 MS
QT INTERVAL: 424 MS
QT INTERVAL: 430 MS
QTC INTERVAL: 440 MS
QTC INTERVAL: 440 MS
QTC INTERVAL: 451 MS
QTC INTERVAL: 453 MS
SODIUM SERPL-SCNC: 140 MMOL/L (ref 136–145)
T WAVE AXIS: 147 DEGREES
T WAVE AXIS: 22 DEGREES
T WAVE AXIS: 29 DEGREES
T WAVE AXIS: 59 DEGREES
TRIGL SERPL-MCNC: 183 MG/DL
TROPONIN I SERPL-MCNC: 6.31 NG/ML
TROPONIN I SERPL-MCNC: 7.4 NG/ML
VENTRICULAR RATE: 63 BPM
VENTRICULAR RATE: 65 BPM
VENTRICULAR RATE: 70 BPM
VENTRICULAR RATE: 73 BPM

## 2018-04-06 PROCEDURE — 99239 HOSP IP/OBS DSCHRG MGMT >30: CPT | Performed by: HOSPITALIST

## 2018-04-06 PROCEDURE — 80053 COMPREHEN METABOLIC PANEL: CPT | Performed by: HOSPITALIST

## 2018-04-06 PROCEDURE — 84484 ASSAY OF TROPONIN QUANT: CPT | Performed by: HOSPITALIST

## 2018-04-06 PROCEDURE — 4A023N7 MEASUREMENT OF CARDIAC SAMPLING AND PRESSURE, LEFT HEART, PERCUTANEOUS APPROACH: ICD-10-PCS | Performed by: INTERNAL MEDICINE

## 2018-04-06 PROCEDURE — 99255 IP/OBS CONSLTJ NEW/EST HI 80: CPT | Performed by: INTERNAL MEDICINE

## 2018-04-06 PROCEDURE — C1769 GUIDE WIRE: HCPCS | Performed by: NURSE PRACTITIONER

## 2018-04-06 PROCEDURE — B2111ZZ FLUOROSCOPY OF MULTIPLE CORONARY ARTERIES USING LOW OSMOLAR CONTRAST: ICD-10-PCS | Performed by: INTERNAL MEDICINE

## 2018-04-06 PROCEDURE — B2151ZZ FLUOROSCOPY OF LEFT HEART USING LOW OSMOLAR CONTRAST: ICD-10-PCS | Performed by: INTERNAL MEDICINE

## 2018-04-06 PROCEDURE — 99152 MOD SED SAME PHYS/QHP 5/>YRS: CPT | Performed by: NURSE PRACTITIONER

## 2018-04-06 PROCEDURE — 93458 L HRT ARTERY/VENTRICLE ANGIO: CPT | Performed by: NURSE PRACTITIONER

## 2018-04-06 PROCEDURE — 93458 L HRT ARTERY/VENTRICLE ANGIO: CPT | Performed by: INTERNAL MEDICINE

## 2018-04-06 PROCEDURE — 85730 THROMBOPLASTIN TIME PARTIAL: CPT | Performed by: HOSPITALIST

## 2018-04-06 PROCEDURE — 99152 MOD SED SAME PHYS/QHP 5/>YRS: CPT | Performed by: INTERNAL MEDICINE

## 2018-04-06 PROCEDURE — 80061 LIPID PANEL: CPT | Performed by: INTERNAL MEDICINE

## 2018-04-06 PROCEDURE — C1894 INTRO/SHEATH, NON-LASER: HCPCS | Performed by: NURSE PRACTITIONER

## 2018-04-06 RX ORDER — LISINOPRIL 5 MG/1
5 TABLET ORAL DAILY
Status: DISCONTINUED | OUTPATIENT
Start: 2018-04-06 | End: 2018-04-07

## 2018-04-06 RX ORDER — MORPHINE SULFATE 4 MG/ML
4 INJECTION, SOLUTION INTRAMUSCULAR; INTRAVENOUS EVERY 4 HOURS PRN
Status: DISCONTINUED | OUTPATIENT
Start: 2018-04-06 | End: 2018-04-07 | Stop reason: HOSPADM

## 2018-04-06 RX ORDER — DULOXETIN HYDROCHLORIDE 20 MG/1
20 CAPSULE, DELAYED RELEASE ORAL DAILY
Status: CANCELLED | OUTPATIENT
Start: 2018-04-07

## 2018-04-06 RX ORDER — OXYCODONE HYDROCHLORIDE 5 MG/1
5 TABLET ORAL EVERY 4 HOURS PRN
Status: DISCONTINUED | OUTPATIENT
Start: 2018-04-06 | End: 2018-04-07 | Stop reason: HOSPADM

## 2018-04-06 RX ORDER — FENTANYL CITRATE 50 UG/ML
INJECTION, SOLUTION INTRAMUSCULAR; INTRAVENOUS CODE/TRAUMA/SEDATION MEDICATION
Status: COMPLETED | OUTPATIENT
Start: 2018-04-06 | End: 2018-04-06

## 2018-04-06 RX ORDER — HEPARIN SODIUM 1000 [USP'U]/ML
4000 INJECTION, SOLUTION INTRAVENOUS; SUBCUTANEOUS AS NEEDED
Status: CANCELLED | OUTPATIENT
Start: 2018-04-06

## 2018-04-06 RX ORDER — PANTOPRAZOLE SODIUM 20 MG/1
20 TABLET, DELAYED RELEASE ORAL
Status: DISCONTINUED | OUTPATIENT
Start: 2018-04-07 | End: 2018-04-07 | Stop reason: HOSPADM

## 2018-04-06 RX ORDER — ATORVASTATIN CALCIUM 40 MG/1
40 TABLET, FILM COATED ORAL EVERY EVENING
Status: DISCONTINUED | OUTPATIENT
Start: 2018-04-06 | End: 2018-04-07 | Stop reason: HOSPADM

## 2018-04-06 RX ORDER — ONDANSETRON 4 MG/1
4 TABLET, ORALLY DISINTEGRATING ORAL EVERY 6 HOURS PRN
Status: DISCONTINUED | OUTPATIENT
Start: 2018-04-06 | End: 2018-04-07 | Stop reason: HOSPADM

## 2018-04-06 RX ORDER — HEPARIN SODIUM 10000 [USP'U]/100ML
11.1 INJECTION, SOLUTION INTRAVENOUS
Status: DISCONTINUED | OUTPATIENT
Start: 2018-04-06 | End: 2018-04-06

## 2018-04-06 RX ORDER — NITROGLYCERIN 20 MG/100ML
INJECTION INTRAVENOUS CODE/TRAUMA/SEDATION MEDICATION
Status: COMPLETED | OUTPATIENT
Start: 2018-04-06 | End: 2018-04-06

## 2018-04-06 RX ORDER — ACETAMINOPHEN 325 MG/1
650 TABLET ORAL EVERY 4 HOURS PRN
Status: DISCONTINUED | OUTPATIENT
Start: 2018-04-06 | End: 2018-04-07 | Stop reason: HOSPADM

## 2018-04-06 RX ORDER — DULOXETIN HYDROCHLORIDE 20 MG/1
20 CAPSULE, DELAYED RELEASE ORAL DAILY
Status: DISCONTINUED | OUTPATIENT
Start: 2018-04-07 | End: 2018-04-07 | Stop reason: HOSPADM

## 2018-04-06 RX ORDER — ONDANSETRON 2 MG/ML
4 INJECTION INTRAMUSCULAR; INTRAVENOUS EVERY 6 HOURS PRN
Status: DISCONTINUED | OUTPATIENT
Start: 2018-04-06 | End: 2018-04-07 | Stop reason: HOSPADM

## 2018-04-06 RX ORDER — ASPIRIN 325 MG
325 TABLET ORAL DAILY
Status: DISCONTINUED | OUTPATIENT
Start: 2018-04-06 | End: 2018-04-06

## 2018-04-06 RX ORDER — SODIUM CHLORIDE 9 MG/ML
75 INJECTION, SOLUTION INTRAVENOUS CONTINUOUS
Status: DISCONTINUED | OUTPATIENT
Start: 2018-04-06 | End: 2018-04-06

## 2018-04-06 RX ORDER — LIDOCAINE HYDROCHLORIDE 10 MG/ML
INJECTION, SOLUTION INFILTRATION; PERINEURAL CODE/TRAUMA/SEDATION MEDICATION
Status: COMPLETED | OUTPATIENT
Start: 2018-04-06 | End: 2018-04-06

## 2018-04-06 RX ORDER — HEPARIN SODIUM 1000 [USP'U]/ML
2000 INJECTION, SOLUTION INTRAVENOUS; SUBCUTANEOUS AS NEEDED
Status: DISCONTINUED | OUTPATIENT
Start: 2018-04-06 | End: 2018-04-06

## 2018-04-06 RX ORDER — SODIUM CHLORIDE 9 MG/ML
125 INJECTION, SOLUTION INTRAVENOUS CONTINUOUS
Status: DISCONTINUED | OUTPATIENT
Start: 2018-04-06 | End: 2018-04-06

## 2018-04-06 RX ORDER — NITROGLYCERIN 0.4 MG/1
0.4 TABLET SUBLINGUAL
Status: DISCONTINUED | OUTPATIENT
Start: 2018-04-06 | End: 2018-04-07 | Stop reason: HOSPADM

## 2018-04-06 RX ORDER — ONDANSETRON 4 MG/1
4 TABLET, ORALLY DISINTEGRATING ORAL EVERY 6 HOURS PRN
Status: CANCELLED | OUTPATIENT
Start: 2018-04-06

## 2018-04-06 RX ORDER — PANTOPRAZOLE SODIUM 20 MG/1
20 TABLET, DELAYED RELEASE ORAL
Status: CANCELLED | OUTPATIENT
Start: 2018-04-07

## 2018-04-06 RX ORDER — SODIUM CHLORIDE 9 MG/ML
100 INJECTION, SOLUTION INTRAVENOUS CONTINUOUS
Status: DISPENSED | OUTPATIENT
Start: 2018-04-06 | End: 2018-04-06

## 2018-04-06 RX ORDER — OXYCODONE HYDROCHLORIDE 5 MG/1
5 TABLET ORAL EVERY 4 HOURS PRN
Status: CANCELLED | OUTPATIENT
Start: 2018-04-06

## 2018-04-06 RX ORDER — SODIUM CHLORIDE 9 MG/ML
75 INJECTION, SOLUTION INTRAVENOUS CONTINUOUS
Status: CANCELLED | OUTPATIENT
Start: 2018-04-06

## 2018-04-06 RX ORDER — ALBUTEROL SULFATE 90 UG/1
1-2 AEROSOL, METERED RESPIRATORY (INHALATION)
COMMUNITY
Start: 2016-03-03 | End: 2018-04-24 | Stop reason: SDUPTHER

## 2018-04-06 RX ORDER — METOPROLOL SUCCINATE 100 MG/1
100 TABLET, EXTENDED RELEASE ORAL DAILY
Status: DISCONTINUED | OUTPATIENT
Start: 2018-04-07 | End: 2018-04-07 | Stop reason: HOSPADM

## 2018-04-06 RX ORDER — METOPROLOL SUCCINATE 50 MG/1
100 TABLET, EXTENDED RELEASE ORAL DAILY
Status: CANCELLED | OUTPATIENT
Start: 2018-04-07

## 2018-04-06 RX ORDER — ACETAMINOPHEN 325 MG/1
650 TABLET ORAL EVERY 4 HOURS PRN
Status: CANCELLED | OUTPATIENT
Start: 2018-04-06

## 2018-04-06 RX ORDER — ASPIRIN 325 MG
325 TABLET ORAL DAILY
Status: DISCONTINUED | OUTPATIENT
Start: 2018-04-07 | End: 2018-04-07

## 2018-04-06 RX ORDER — CETIRIZINE HYDROCHLORIDE 10 MG/1
1 TABLET ORAL DAILY PRN
COMMUNITY

## 2018-04-06 RX ORDER — VERAPAMIL HYDROCHLORIDE 2.5 MG/ML
INJECTION, SOLUTION INTRAVENOUS CODE/TRAUMA/SEDATION MEDICATION
Status: COMPLETED | OUTPATIENT
Start: 2018-04-06 | End: 2018-04-06

## 2018-04-06 RX ORDER — ONDANSETRON 4 MG/1
4 TABLET, ORALLY DISINTEGRATING ORAL EVERY 6 HOURS PRN
Status: DISCONTINUED | OUTPATIENT
Start: 2018-04-06 | End: 2018-04-06 | Stop reason: HOSPADM

## 2018-04-06 RX ORDER — MIDAZOLAM HYDROCHLORIDE 1 MG/ML
INJECTION INTRAMUSCULAR; INTRAVENOUS CODE/TRAUMA/SEDATION MEDICATION
Status: COMPLETED | OUTPATIENT
Start: 2018-04-06 | End: 2018-04-06

## 2018-04-06 RX ORDER — ONDANSETRON 2 MG/ML
INJECTION INTRAMUSCULAR; INTRAVENOUS
Status: COMPLETED
Start: 2018-04-06 | End: 2018-04-06

## 2018-04-06 RX ORDER — HEPARIN SODIUM 1000 [USP'U]/ML
INJECTION, SOLUTION INTRAVENOUS; SUBCUTANEOUS CODE/TRAUMA/SEDATION MEDICATION
Status: COMPLETED | OUTPATIENT
Start: 2018-04-06 | End: 2018-04-06

## 2018-04-06 RX ORDER — ASPIRIN 325 MG
325 TABLET ORAL ONCE
Status: DISCONTINUED | OUTPATIENT
Start: 2018-04-06 | End: 2018-04-06

## 2018-04-06 RX ORDER — HEPARIN SODIUM 1000 [USP'U]/ML
4000 INJECTION, SOLUTION INTRAVENOUS; SUBCUTANEOUS AS NEEDED
Status: DISCONTINUED | OUTPATIENT
Start: 2018-04-06 | End: 2018-04-06

## 2018-04-06 RX ORDER — ATORVASTATIN CALCIUM 40 MG/1
40 TABLET, FILM COATED ORAL EVERY EVENING
Status: CANCELLED | OUTPATIENT
Start: 2018-04-06

## 2018-04-06 RX ORDER — HEPARIN SODIUM 10000 [USP'U]/100ML
11.1 INJECTION, SOLUTION INTRAVENOUS
Status: CANCELLED | OUTPATIENT
Start: 2018-04-06

## 2018-04-06 RX ORDER — HEPARIN SODIUM 1000 [USP'U]/ML
2000 INJECTION, SOLUTION INTRAVENOUS; SUBCUTANEOUS AS NEEDED
Status: CANCELLED | OUTPATIENT
Start: 2018-04-06

## 2018-04-06 RX ORDER — MORPHINE SULFATE 4 MG/ML
4 INJECTION, SOLUTION INTRAMUSCULAR; INTRAVENOUS EVERY 4 HOURS PRN
Status: CANCELLED | OUTPATIENT
Start: 2018-04-06

## 2018-04-06 RX ADMIN — NITROGLYCERIN 200 MCG: 20 INJECTION INTRAVENOUS at 17:18

## 2018-04-06 RX ADMIN — MIDAZOLAM 2 MG: 1 INJECTION INTRAMUSCULAR; INTRAVENOUS at 17:10

## 2018-04-06 RX ADMIN — ONDANSETRON HYDROCHLORIDE 4 MG: 2 INJECTION, SOLUTION INTRAMUSCULAR; INTRAVENOUS at 09:03

## 2018-04-06 RX ADMIN — SODIUM CHLORIDE 100 ML/HR: 0.9 INJECTION, SOLUTION INTRAVENOUS at 18:00

## 2018-04-06 RX ADMIN — OXYCODONE HYDROCHLORIDE 5 MG: 5 TABLET ORAL at 14:02

## 2018-04-06 RX ADMIN — DULOXETINE HYDROCHLORIDE 20 MG: 20 CAPSULE, DELAYED RELEASE ORAL at 09:04

## 2018-04-06 RX ADMIN — ONDANSETRON 4 MG: 2 INJECTION INTRAMUSCULAR; INTRAVENOUS at 00:11

## 2018-04-06 RX ADMIN — MIDAZOLAM 1 MG: 1 INJECTION INTRAMUSCULAR; INTRAVENOUS at 17:16

## 2018-04-06 RX ADMIN — SODIUM CHLORIDE 125 ML/HR: 0.9 INJECTION, SOLUTION INTRAVENOUS at 16:49

## 2018-04-06 RX ADMIN — PANTOPRAZOLE SODIUM 20 MG: 20 TABLET, DELAYED RELEASE ORAL at 07:01

## 2018-04-06 RX ADMIN — LISINOPRIL 5 MG: 5 TABLET ORAL at 18:00

## 2018-04-06 RX ADMIN — ONDANSETRON 4 MG: 4 TABLET, ORALLY DISINTEGRATING ORAL at 16:47

## 2018-04-06 RX ADMIN — METOPROLOL SUCCINATE 100 MG: 50 TABLET, EXTENDED RELEASE ORAL at 09:03

## 2018-04-06 RX ADMIN — ATORVASTATIN CALCIUM 40 MG: 40 TABLET, FILM COATED ORAL at 17:51

## 2018-04-06 RX ADMIN — HEPARIN SODIUM AND DEXTROSE 13.1 UNITS/KG/HR: 10000; 5 INJECTION INTRAVENOUS at 14:05

## 2018-04-06 RX ADMIN — HEPARIN SODIUM 2000 UNITS: 1000 INJECTION, SOLUTION INTRAVENOUS; SUBCUTANEOUS at 01:01

## 2018-04-06 RX ADMIN — IOHEXOL 75 ML: 350 INJECTION, SOLUTION INTRAVENOUS at 17:27

## 2018-04-06 RX ADMIN — VERAPAMIL HYDROCHLORIDE 2.5 MG: 2.5 INJECTION INTRAVENOUS at 17:18

## 2018-04-06 RX ADMIN — SODIUM CHLORIDE 75 ML/HR: 0.9 INJECTION, SOLUTION INTRAVENOUS at 07:04

## 2018-04-06 RX ADMIN — LIDOCAINE HYDROCHLORIDE 2 ML: 10 INJECTION, SOLUTION INFILTRATION; PERINEURAL at 17:14

## 2018-04-06 RX ADMIN — HEPARIN SODIUM 4000 UNITS: 1000 INJECTION INTRAVENOUS; SUBCUTANEOUS at 17:18

## 2018-04-06 RX ADMIN — ONDANSETRON 4 MG: 2 INJECTION INTRAMUSCULAR; INTRAVENOUS at 09:03

## 2018-04-06 RX ADMIN — ACETAMINOPHEN 650 MG: 325 TABLET, FILM COATED ORAL at 16:47

## 2018-04-06 RX ADMIN — FENTANYL CITRATE 50 MCG: 50 INJECTION, SOLUTION INTRAMUSCULAR; INTRAVENOUS at 17:10

## 2018-04-06 RX ADMIN — HEPARIN SODIUM AND DEXTROSE 11.1 UNITS/KG/HR: 10000; 5 INJECTION INTRAVENOUS at 16:50

## 2018-04-06 RX ADMIN — FENTANYL CITRATE 50 MCG: 50 INJECTION, SOLUTION INTRAMUSCULAR; INTRAVENOUS at 17:17

## 2018-04-06 NOTE — DISCHARGE SUMMARY
Discharge Summary -   Ron Storey 65 y o  female MRN: [id number]    Unit/Bed#: [facility] 259-23 Encounter: [id number]    Admission Date: 4/6/2018   Discharge Date:  4/7/2018     Disposition: Home      PCP: Bk Beebe DO  OP Cardiologist: Laith Carter MD  Interventional cardiologist:  Dr Timo Shahid    Discharge Diagnosis:   NSTEMI   CAD; 100 % distal LAD lesion  NO PCI due to distal vessel dissection/very small size  Secondary Diagnoses:   Anxiety  Morbid obesity  HTN     Condition at Discharge: stable     Consultants: None    Procedures:   1  Cardiac catheterization   CORONARY VESSELS:   --  The coronary circulation is right dominant  --  Left main: The vessel was medium to large sized  Angiography showed no evidence of disease  --  LAD: The vessel was medium to large sized  --  Distal LAD: There was a 100 % stenosis  There was THEO grade 1 flow through the vessel (slow flow without perfusion)  This lesion is a likely culprit for the patient's recent myocardial infarction  secondary to dissection  --  Circumflex: The vessel was small sized  Angiography showed no evidence of disease  There was one major obtuse marginal   --  RCA: The vessel was medium to large sized  Angiography showed no evidence of disease  The RCA vascularized the posterior and lateral walls      IMPRESSIONS:  There is significant single vessel coronary artery disease      RECOMMENDATIONS  The patient should continue with the present medications except as noted above  beta blocker plus ACEI  No intervention due to distal vessel dissection/very small size  usually adequate healing develops with expectant management     Prepared and signed by  Luis Miguel Delgado MD  Signed 04/06/2018 17:37:46     Study diagram     Angiographic findings  Native coronary lesions:  ·Distal LAD: Lesion 1: 100 % stenosis      2  Echocardiogram  EF 55 %  Small area of apical hypokinesis  Mild AI          /66 (BP Location: Left arm)   Pulse 71 Temp 98 5 °F (36 9 °C) (Oral)   Resp 18   Ht 5' 5" (1 651 m)   Wt 115 kg (253 lb 15 5 oz)   SpO2 94%   BMI 42 26 kg/m²        ROS  See todays note  Physical Examsee todays chart note        HPI and Hospital Course: Sunita Damon is a 65 y o  female patient who originally presented to [facility] 4/5/2018 due to chest pain  Serial ECGs showed T-wave inversion in V1 through V3 with non specific STTW changes  She was started on a heparin drip  Her OP beta-blocker was continued  She was given a full-dose aspirin and statin  Her troponins kali to 7 4 and she was transferred to [facility] for a left heart catheterization  Angiography disclosed a 100% distal LAD lesion, not amenable to PCI  Maximal medical mgmt was recommended  As an OP, she was on a bb and zestoretic  The beta blocker was continued  She was placed on lisinopril 5 mg/d, uptitrated to 10 mg/d and amlodipine 5 mg/d were added for antianginal effect  An echocardiogram disclosed preserved LV function with a very tiny area of apical hypokinesis, a mild AI, per the report of Dr Starr Hart  All discharge instructions were reviewed with the pt  Discharge Medications:  See after visit summary for reconciled discharge medications provided to patient and family        Pertinent Labs/diagnostics:    Results from last 7 days  Lab Units 04/06/18  0739 04/05/18  2313 04/05/18  2047   TROPONIN I ng/mL 7 40* 6 31* 4 95*       CBC with diff:   Results from last 7 days  Lab Units 04/05/18  1214   WBC Thousand/uL 13 07*   HEMOGLOBIN g/dL 14 4   HEMATOCRIT % 42 0   MCV fL 87   PLATELETS Thousands/uL 200   MCH pg 29 8   MCHC g/dL 34 3   RDW % 13 1   MPV fL 10 4         CMP:  Results from last 7 days  Lab Units 04/06/18  0728 04/05/18  1214   SODIUM mmol/L 140 141   POTASSIUM mmol/L 3 7 3 6   CHLORIDE mmol/L 104 103   CO2 mmol/L 26 31   ANION GAP mmol/L 10 7   BUN mg/dL 18 18   CREATININE mg/dL 0 88 0 93   GLUCOSE RANDOM mg/dL 135 170* CALCIUM mg/dL 8 8 8 7   AST U/L 72* 17   ALT U/L 27 24   ALK PHOS U/L 81 87   TOTAL PROTEIN g/dL 6 5 7 3   BILIRUBIN TOTAL mg/dL 0 60 0 30   EGFR ml/min/1 73sq m 71 67         Lipid Profile:   Lab Results   Component Value Date    CHOL 211 (H) 04/06/2018    CHOL 182 01/23/2015     Lab Results   Component Value Date    HDL 47 04/06/2018    HDL 40 01/23/2015     Lab Results   Component Value Date    LDLCALC 127 (H) 04/06/2018    LDLCALC 115 (H) 01/23/2015     Lab Results   Component Value Date    TRIG 183 (H) 04/06/2018    TRIG 133 01/23/2015       Discharge instructions/Information to patient and family:   See after visit summary for information provided to patient and family  Provisions for Follow-Up Care:  See after visit summary for information related to follow-up care and any pertinent home health orders  Planned Readmission: NO    Discharge Statement   I spent 45 minutes minutes discharging the patient  This time was spent on the day of discharge  I had direct contact with the patient on the day of discharge  Additional documentation is required if more than 30 minutes were spent on discharge  ** Please Note: Dragon 360 Dictation voice to text software may have been used in the creation of this document   **

## 2018-04-06 NOTE — PROGRESS NOTES
Pt observed to be sleeping during most hrly rounds overnight; Heparin drip rate adjusted at 0100  Pt denied chest pain throughout night  States Zofran effective for transient nausea

## 2018-04-06 NOTE — CASE MANAGEMENT
Thank you,  [address] in the [employer] by [employer] for 2017  Network Utilization Review Department  Phone: [phone]; Fax [phone]  ATTENTION: The Network Utilization Review Department is now centralized for our 7 Facilities  Make a note that we have a new phone and fax numbers for our Department  Please call with any questions or concerns to [phone] and carefully follow the prompts so that you are directed to the right person  All voicemails are confidential  Fax any determinations, approvals, denials, and requests for initial or continue stay review clinical to [phone]  Due to HIGH CALL volume, it would be easier if you could please send faxed requests to expedite your requests and in part, help us provide discharge notifications faster  Initial Clinical Review    Admission: Date/Time/Statement: INPT 4/5/18 @ 1641    Orders Placed This Encounter   Procedures    Inpatient Admission (expected length of stay for this patient is greater than two midnights)     Standing Status:   Standing     Number of Occurrences:   1     Order Specific Question:   Admitting Physician     Answer:   Ha Martinez [[id number]]     Order Specific Question:   Level of Care     Answer:   Med Surg [[id number]]     Order Specific Question:   Bed request comments     Answer:   Telemetry     Order Specific Question:   Estimated length of stay     Answer:   More than 2 Midnights     Order Specific Question:   Certification     Answer:   I certify that inpatient services are medically necessary for this patient for a duration of greater than two midnights  See H&P and MD Progress Notes for additional information about the patient's course of treatment           ED: Date/Time/Mode of Arrival:   ED Arrival Information     Expected Arrival Acuity Means of Arrival Escorted By Service Admission Type    4/5/2018 11:20 4/5/2018 11:52 Emergent Walk-In Sp[facility] General Medicine Emergency Arrival Complaint    chest pain          Chief Complaint:   Chief Complaint   Patient presents with    Chest Pain     pt stated that the chest pain started at 10am this morning, pt stated that she had just brought her dog in and it started, no SOB       History of Illness: José Miguel Bolaños is a 65 y o  female who presents with a chief complaint of chest pain  Earlier this morning the patient was outside with her dog  She came in and sat down on the sofa began feeling retrosternal sharp constant chest pain that radiated to her right chest   She said the pain persisted without interruption until she received Maalox and viscous lidocaine in the ER  She denies any associated shortness of breath, nausea, vomiting  She does report having some diaphoresis at home but also felt that the heat was turned up to high in her house   Patient denies palpitations, cough, diarrhea, abdominal pain, fevers, chills, night sweats, headache, visual disturbances, neck stiffness, new focal neurologic deficit, rash, dysuria, heat or cold intolerance, significant weight gain or loss           ED Vital Signs:   ED Triage Vitals   Temperature Pulse Respirations Blood Pressure SpO2   04/05/18 1202 04/05/18 1202 04/05/18 1202 04/05/18 1202 04/05/18 1202   (!) 96 8 °F (36 °C) 73 16 (!) 186/91 98 %      Temp Source Heart Rate Source Patient Position - Orthostatic VS BP Location FiO2 (%)   04/05/18 1202 04/05/18 1202 04/05/18 1202 04/05/18 1202 --   Temporal Monitor Lying Right arm       Pain Score       04/05/18 1200       9        Wt Readings from Last 1 Encounters:   04/06/18 116 kg (256 lb 13 4 oz)       Vital Signs (abnormal):   04/05/18 1300  --  71  22  157/70  98 %  None (Room air)  Lying   04/05/18 1245  --  71  19  165/71  98 %  None (Room air)  Lying   04/05/18 1230  --  72  18  169/58  98 %  None (Room air)  Lying   04/05/18 1215  --  70  20  165/77  98 %  --  --   04/05/18 1202   96 8 °F (36 °C)  73  16   186/91  98 %  None (Room air)  Lying   04/05/18 1200  --  --  --  --  --  None (Room air)  --        CXR: WNL    Abnormal Labs/Diagnostic Test Results:   Lab Units 04/05/18  1214   WBC Thousand/uL 13 07*   HEMOGLOBIN g/dL 14 4   HEMATOCRIT % 42 0   PLATELETS Thousands/uL 200   NEUTROS PCT % 88*   LYMPHS PCT % 6*   MONOS PCT % 5   EOS PCT % 1         Results from last 7 days  Lab Units 04/05/18  1214   SODIUM mmol/L 141   POTASSIUM mmol/L 3 6   CHLORIDE mmol/L 103   CO2 mmol/L 31   BUN mg/dL 18   CREATININE mg/dL 0 93   CALCIUM mg/dL 8 7   TOTAL PROTEIN g/dL 7 3   BILIRUBIN TOTAL mg/dL 0 30   ALK PHOS U/L 87   ALT U/L 24   AST U/L 17   GLUCOSE RANDOM mg/dL 170*        TROPONIN 0 04, 1 01, 3 33, 4 95, 6 31, 7 40       Imaging: I have personally reviewed pertinent reports        X-ray chest 2 views   ED Interpretation by Mirela Brannon DO (04/05 1237)   See below       Final Result by Wild Joshua MD (04/05 1232)       No acute cardiopulmonary disease                Workstation performed: [id number]                 EKG, Pathology, and Other Studies Reviewed on Admission:   · EKG:  Sinus rhythm at a rate of 65  Left axis deviation  No ST changes    T-wave flattening in lead III, T-wave inversions in leads V1 through V3        ED Treatment:   Medication Administration from 04/05/2018 1120 to 04/05/2018 1742       Date/Time Order Dose Route Action Action by Comments     04/05/2018 1253 ketorolac (TORADOL) injection 30 mg 30 mg Intravenous Given Davion Negrete RN      04/05/2018 1407 famotidine (PEPCID) injection 20 mg 20 mg Intravenous Given Davion Negrete RN      04/05/2018 1408 aluminum-magnesium hydroxide-simethicone (MYLANTA) 200-200-20 mg/5 mL oral suspension 30 mL 30 mL Oral Given Davion Negrete RN      04/05/2018 1408 lidocaine viscous (XYLOCAINE) 2 % mucosal solution 10 mL 10 mL Swish & Spit Given Davion Negrete RN      04/05/2018 1656 aspirin chewable tablet 324 mg 324 mg Oral Given Davion Negrete RN      04/05/2018 7301 nitroglycerin (NITRO-BID) 2 % TD ointment 0 5 inch 0 5 inch Topical Given Severa Nickel, RN      04/05/2018 1657 heparin (porcine) injection 4,000 Units 4,000 Units Intravenous Given Severa Nickel, RN      04/05/2018 1658 heparin (porcine) 25,000 units in 250 mL infusion (premix) 11 1 Units/kg/hr Intravenous New Bag Severa Nickel, RN           Past Medical/Surgical History: Active Ambulatory Problems     Diagnosis Date Noted    No Active Ambulatory Problems     Resolved Ambulatory Problems     Diagnosis Date Noted    No Resolved Ambulatory Problems     Past Medical History:   Diagnosis Date    Anxiety     GERD (gastroesophageal reflux disease)     Hypertension     Migraine        Admitting Diagnosis: Chest pain [R07 9]  Acute coronary syndrome (HCC) [I24 9]  NSTEMI (non-ST elevated myocardial infarction) ([facility] [address] ) [I21 4]    Age/Sex: 65 y o  female    Assessment/Plan:   Anxiety   Assessment & Plan     -continue Cymbalta          Morbid obesity due to excess calories (HCC)   Assessment & Plan     -encourage weight loss          Essential hypertension   Assessment & Plan     -continue lisinopril, HCTZ, Lopressor          * NSTEMI (non-ST elevated myocardial infarction) (HCC)   Assessment & Plan     -continue aspirin started in the ER  -continue beta-blocker  -start statin  -continue to trend troponin  -monitor on telemetry  -continue heparin infusion that is being started in the ER  -consult Cardiology             VTE Prophylaxis: Heparin Drip  / reason for no mechanical VTE prophylaxis On heparin drip   Code Status:  Full  POLST: POLST is not applicable to this patient  Discussion with family:   at bedside     Anticipated Length of Stay:  Patient will be admitted on an Inpatient basis with an anticipated length of stay of  > 2 midnights     Justification for Hospital Stay:  NSTEMI         Admission Orders:  INPT  TELE  NPO  CARDIAC CATH  CONSULT CARDIOLOGY    Scheduled Meds:   Current Facility-Administered Medications:  acetaminophen 650 mg Oral Q4H PRN Miranda Lawrence MD    aspirin 325 mg Oral Once Miranda Lawrence MD    atorvastatin 40 mg Oral QPM Miranda Lawrence MD    DULoxetine 20 mg Oral Daily Miranda Lawrence MD    heparin (porcine) 11 1 Units/kg/hr (Order-Specific) Intravenous Titrated Maggie Nicole DO Last Rate: 13 1 Units/kg/hr (04/06/18 0100)   heparin (porcine) 2,000 Units Intravenous PRN Maggie Nicole DO    heparin (porcine) 4,000 Units Intravenous PRN Maggie Nicole DO    influenza vaccine 0 5 mL Intramuscular Prior to discharge Miranda Lawrence MD    metoprolol succinate 100 mg Oral Daily Miranda Lawrence MD    morphine injection 4 mg Intravenous Q4H PRN Miranda Lawrence MD    oxyCODONE 5 mg Oral Q4H PRN Miranda Lawrence MD    pantoprazole 20 mg Oral Early Morning Miranda Lawrence MD    pneumococcal 23-valent polysaccharide vaccine 0 5 mL Subcutaneous Prior to discharge Miranda Lawrence MD    sodium chloride 75 mL/hr Intravenous Continuous Miranda Lawrence MD Last Rate: 75 mL/hr (04/06/18 0704)     Continuous Infusions:   heparin (porcine) 11 1 Units/kg/hr (Order-Specific) Last Rate: 13 1 Units/kg/hr (04/06/18 0100)   sodium chloride 75 mL/hr Last Rate: 75 mL/hr (04/06/18 0704)     PRN Meds:   Acetaminophen X1    heparin (porcine) X1    heparin (porcine)    influenza vaccine    morphine injection    oxyCODONE    pneumococcal 23-valent polysaccharide vaccine

## 2018-04-06 NOTE — ASSESSMENT & PLAN NOTE
-continue aspirin   -continue beta-blocker  -cont statin  -continue to trend troponin, last one 4/6 AM 7 40  -monitor on telemetry  -continue heparin infusion   -transfer to [facility] for cath, accepted by Dr Albino Peoples

## 2018-04-06 NOTE — CONSULTS
Consultation - Cardiology   Maryellen Albarran 65 y o  female MRN: [id number]  Unit/Bed#: [address] [place] [address] 208-02 Encounter: [id number]        Assessment:   Principal Problem:    NSTEMI (non-ST elevated myocardial infarction) [facility])  Active Problems:    Essential hypertension    Morbid obesity due to excess calories [facility])    Anxiety      Plan:     Appropriate care has been established already  She is on aspirin, metoprolol, heparin drip, high-intensity statin  Her blood pressure is very nicely controlled on her home regimen at this time, but she did come in with accelerated hypertension  She will be referred for cardiac catheterization, and I would advise discontinuation of lisinopril/HCTZ especially with blood pressures now in the low 100s  Risks and benefits of the procedure were described to the patient and her   They include but are not limited to Stroke, MI, bleeding, kidney injury, dye reaction, anaphylaxis  She has no known IV dye allergy but has had a reraction to gadolinium in the past    She will be transferred to [address] [place] [address] due to lack of beds at [facility]  She would like to follow up with me in [place] at follow up  History of Present Illness   Physician Requesting Consult: Alona Jeffrey MD  Reason for Consult / Principal Problem: NSTEMI  HPI: Maryellen Albarran is a 65y o  year old female who presents with sharp stabbing CP that was noted across the precordium, wirthout radiation, associated with diapohoresis and in the setting of a lot of stress at home with her mother in law who has advanced dementia  Her ECG showed nonspecific T wav changes anteriorly  Her troponins have risen from 1-7  This is consistent with NSTEMI considering the symptoms  She had severe accerleated HTN on admission but much lower now, likely nitially driven by pain and relieved with nitro paste  She has no pain currently  She carries a diagnosis of obesty and HTN    She is on a robust antihypertensive regimen and BP is normally well controlled  She is unsure about her lipids status  Review of Systems   Constitutional: Negative  HENT: Negative  Eyes: Negative  Respiratory: Positive for chest tightness and shortness of breath  Cardiovascular: Positive for chest pain and palpitations  Negative for leg swelling  Gastrointestinal: Negative  Genitourinary: Negative  Musculoskeletal: Negative  Skin: Negative  Neurological: Negative  Hematological: Negative  Psychiatric/Behavioral: Negative  Historical Information   Past Medical History:   Diagnosis Date    Anxiety     GERD (gastroesophageal reflux disease)     Hypertension     Migraine      Past Surgical History:   Procedure Laterality Date    BACK SURGERY      HYSTERECTOMY      LUMBAR DISCECTOMY       History   Alcohol Use    Yes     Comment: occassionally     History   Drug Use No     History   Smoking Status    Never Smoker   Smokeless Tobacco    Never Used     History reviewed  No pertinent family history      Allergies:  No Known Allergies    Medications:   Scheduled Meds:  Current Facility-Administered Medications:  acetaminophen 650 mg Oral Q4H PRN Jason Rodríguez MD    aspirin 325 mg Oral Once Jason Rodríguez MD    atorvastatin 40 mg Oral QPM Jason Rodríguez MD    DULoxetine 20 mg Oral Daily Jason Rodríguez MD    heparin (porcine) 11 1 Units/kg/hr (Order-Specific) Intravenous Titrated Maggie Nicole DO Last Rate: 13 1 Units/kg/hr (04/06/18 0100)   heparin (porcine) 2,000 Units Intravenous PRN Maggie Nicole DO    heparin (porcine) 4,000 Units Intravenous PRN Maggie Nicole DO    influenza vaccine 0 5 mL Intramuscular Prior to discharge Jason Rodríguez MD    lisinopril-hydrochlorothiazide (PRINZIDE 20/25) combo dose  Oral Daily Jason Rodríguez MD    metoprolol succinate 100 mg Oral Daily Jason Rodríguez MD    morphine injection 4 mg Intravenous Q4H PRN Jason Rodríguez MD    oxyCODONE 5 mg Oral Q4H PRN Marlin Tomlinson MD    pantoprazole 20 mg Oral Early Morning Marlin Tomlinson MD    pneumococcal 23-valent polysaccharide vaccine 0 5 mL Subcutaneous Prior to discharge Marlin Tomlinson MD    sodium chloride 75 mL/hr Intravenous Continuous Marlin Tomlinson MD Last Rate: 75 mL/hr (04/06/18 0704)     Continuous Infusions:  heparin (porcine) 11 1 Units/kg/hr (Order-Specific) Last Rate: 13 1 Units/kg/hr (04/06/18 0100)   sodium chloride 75 mL/hr Last Rate: 75 mL/hr (04/06/18 0704)     PRN Meds:    acetaminophen 650 mg Q4H PRN   heparin (porcine) 2,000 Units PRN   heparin (porcine) 4,000 Units PRN   influenza vaccine 0 5 mL Prior to discharge   morphine injection 4 mg Q4H PRN   oxyCODONE 5 mg Q4H PRN   pneumococcal 23-valent polysaccharide vaccine 0 5 mL Prior to discharge       Objective:   Vitals:   Vitals:    04/05/18 2339   BP: 114/62   Pulse: 73   Resp: 18   Temp: 98 4 °F (36 9 °C)   SpO2: 94%     Body mass index is 42 3 kg/m²  Orthostatic Blood Pressures    Flowsheet Row Most Recent Value   Blood Pressure  114/62 filed at 04/05/2018 2339   Patient Position - Orthostatic VS  Lying filed at 04/05/2018 4032        Systolic (51FWB), QUC:292 , Min:114 , LILIA:573     Diastolic (09KIO), BYE:31, Min:58, Max:95      Intake/Output Summary (Last 24 hours) at 04/06/18 0825  Last data filed at 04/06/18 0700   Gross per 24 hour   Intake            690 8 ml   Output                0 ml   Net            690 8 ml     Weight (last 2 days)     Date/Time   Weight    04/05/18 1734  115 (254 19)    04/05/18 1202  115 (253)            Invasive Devices     Peripheral Intravenous Line            Peripheral IV 04/05/18 Left Antecubital less than 1 day    Peripheral IV 04/05/18 Right Antecubital less than 1 day                Physical Exam   Constitutional: She is oriented to person, place, and time  She appears well-developed and well-nourished  No distress  HENT:   Head: Normocephalic and atraumatic     Mouth/Throat: Oropharynx is clear and moist    Eyes: Conjunctivae and EOM are normal  Pupils are equal, round, and reactive to light  No scleral icterus  Neck: Normal range of motion  Neck supple  No tracheal deviation present  No thyromegaly present  Cardiovascular: Normal rate, regular rhythm, normal heart sounds and intact distal pulses  Exam reveals no gallop and no friction rub  No murmur heard  Pulmonary/Chest: Effort normal and breath sounds normal  No respiratory distress  She has no wheezes  She has no rales  Abdominal: Soft  Bowel sounds are normal  She exhibits no distension  There is no tenderness  Musculoskeletal: Normal range of motion  She exhibits no edema, tenderness or deformity  Neurological: She is alert and oriented to person, place, and time  No cranial nerve deficit  Skin: Skin is warm and dry  No rash noted  She is not diaphoretic  No erythema  Psychiatric: She has a normal mood and affect   Judgment normal        Labs:     Results from last 7 days  Lab Units 04/06/18  0739 04/05/18 2313 04/05/18  2047   TROPONIN I ng/mL 7 40* 6 31* 4 95*     CBC with diff:   Results from last 7 days  Lab Units 04/05/18  1214   WBC Thousand/uL 13 07*   HEMOGLOBIN g/dL 14 4   HEMATOCRIT % 42 0   MCV fL 87   PLATELETS Thousands/uL 200   MCH pg 29 8   MCHC g/dL 34 3   RDW % 13 1   MPV fL 10 4     CMP:  Results from last 7 days  Lab Units 04/05/18  1214   SODIUM mmol/L 141   POTASSIUM mmol/L 3 6   CHLORIDE mmol/L 103   CO2 mmol/L 31   ANION GAP mmol/L 7   BUN mg/dL 18   CREATININE mg/dL 0 93   GLUCOSE RANDOM mg/dL 170*   CALCIUM mg/dL 8 7   AST U/L 17   ALT U/L 24   ALK PHOS U/L 87   TOTAL PROTEIN g/dL 7 3   BILIRUBIN TOTAL mg/dL 0 30   EGFR ml/min/1 73sq m 67     NT-proBNP: No results found for: NTBNP   Magnesium:    Coags:  Results from last 7 days  Lab Units 04/05/18  2313 04/05/18  1658   PTT seconds 49* 31   INR   --  0 97     TSH:     Hemoglobin A1C:    Lipid Profile:   Lab Results   Component Value Date    CHOL 182 01/23/2015     Lab Results   Component Value Date    HDL 40 01/23/2015     Lab Results   Component Value Date    LDLCALC 115 (H) 01/23/2015     Lab Results   Component Value Date    TRIG 133 01/23/2015       Imaging & Testing   Cardiac testing:   EKG reviewed personally: SR, nonspecific T wave abn  Telemetry reviewed personally: SR       Imaging:   I have personally reviewed pertinent reports  X-ray Chest 2 Views    Result Date: 4/5/2018  Narrative: CHEST INDICATION: 81-year-old female, chest pain  COMPARISON:  None EXAM PERFORMED/VIEWS:  XR CHEST PA & LATERAL Dual-energy, 4 images FINDINGS: Cardiomediastinal silhouette appears unremarkable  The lungs are clear  No pneumothorax or pleural effusion  Osseous structures appear within normal limits for patient age  Impression: No acute cardiopulmonary disease  Workstation performed: Morgan Alford MD    Portions of the record may have been created with voice recognition software   Occasional wrong word or "sound a like" substitutions may have occurred due to the inherent limitations of voice recognition software   Read the chart carefully and recognize, using context, where substitutions have occurred

## 2018-04-06 NOTE — TREATMENT PLAN
Seen after transfer from [facility]  Pain free  VSS  NPO for cardiac cath  Cath briefly discussed by Dr Melisa Manuel  All questions answered

## 2018-04-06 NOTE — SOCIAL WORK
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

## 2018-04-06 NOTE — PROGRESS NOTES
Informed about rising troponin  Spoke with Dr Hira Sy attending physician who stated he had spoken with Dr Lesly Esqueda earlier and recommended if continue to rise to call cardiology fellow in [place] for transfer  Trop kali to 3 3 from 1 1  EKG unchanged  Pt denies any CP or SOB  THE [facility], spoke with cardiologist on call who recommended to continue current management here in [facility] and keep NPO after MN for possible cath in AM in [facility]  Transfer center states will call again in AM to initiate transfer  Pt informed about current plan

## 2018-04-06 NOTE — DISCHARGE SUMMARY
Discharge- Manuel Soni 1955, 65 y o  female MRN: [id number]    Unit/Bed#: [address] [place] [address] Encounter: [id number]    Primary Care Provider: Jayne Ruiz DO   Date and time admitted to hospital: 4/5/2018 12:10 PM        Anxiety   Assessment & Plan    -continue Cymbalta        Morbid obesity due to excess calories ([facility] )   Assessment & Plan    -encourage weight loss        Essential hypertension   Assessment & Plan    -continue lisinopril, HCTZ, Lopressor        * NSTEMI (non-ST elevated myocardial infarction) ([facility] )   Assessment & Plan    -continue aspirin   -continue beta-blocker  -cont statin  -continue to trend troponin, last one 4/6 AM 7 40  -monitor on telemetry  -continue heparin infusion   -transfer to [facility] for cath, accepted by Dr Sundar Silva            Resolved Problems  Date Reviewed: 4/6/2018    None          Consultations During Hospital Stay:  · Cardiology    Procedures Performed:     · None    Significant Findings / Test Results:     · See above    Incidental Findings:   · None     Test Results Pending at Discharge (will require follow up):   · N/A, transfer to [facility]     Outpatient Tests Requested:  N/A, transfer to [facility]    Complications:  None    Reason for Admission:  Chest pain, NSTEMI    Hospital Course: Manuel Soni is a 65 y o  female patient who originally presented to the hospital on 4/5/2018 due to chest pain as outlined in the H&P done yesterday  Patient was started on a heparin drip  She was already on a beta-blocker at home and this was continued  She was given a full-dose aspirin and statin  Patient had serial ECGs which only showed T-wave inversion in V1 through V3  There were no ST changes  Patient's troponins were trended and they continue to rise  The troponin this morning was 7 4  Patient is being transferred to [facility] for cardiac catheterization      Please see above list of diagnoses and related plan for additional information  Condition at Discharge: good     Discharge Day Visit / Exam:     Subjective:  Patient denies chest pain or shortness of breath  Vitals: Blood Pressure: 116/59 (04/06/18 0829)  Pulse: 72 (04/06/18 0829)  Temperature: 98 2 °F (36 8 °C) (04/06/18 0829)  Temp Source: Oral (04/06/18 0829)  Respirations: 18 (04/06/18 0829)  Height: 5' 5" (165 1 cm) (04/05/18 1734)  Weight - Scale: 116 kg (256 lb 13 4 oz) (04/06/18 0829)  SpO2: 96 % (04/06/18 0829)  Exam:   Physical Exam  Gen: NAD, AAOx3, well developed, well nourished  Eyes: EOMI, PERRLA, no scleral icterus  ENMT:  Oropharynx clear of erythema or exudates, no nasal discharge, no otic discharge, moist mucous membranes  Neck:  Supple  Cardiovascular:  Regular rate and rhythm, normal S1-S2, no murmurs, rubs, or gallops  Lungs:  Clear to auscultation bilaterally, no wheezes, or rales, or rhonchi  Abdomen:  Positive bowel sounds, soft, nontender, nondistended, no palpable organomegaly   Skin:  Intact, no obvious lesions or rashes, no edema  Neuro: Cranial nerves 2-12 are intact, non-focal, 5/5 strength in all 4 extremities    Discharge instructions/Information to patient and family:   See after visit summary for information provided to patient and family  Provisions for Follow-Up Care:  See after visit summary for information related to follow-up care and any pertinent home health orders  Disposition:     [address] [facility]  [address] Transfer to [address] [place] [address] to [facility]:   · Not Applicable to this Patient - Not Applicable to this Patient    Planned Readmission:  [address]     Discharge Statement:  I spent 35 minutes discharging the patient  This time was spent on the day of discharge  I had direct contact with the patient on the day of discharge   Greater than 50% of the total time was spent examining patient, answering all patient questions, arranging and discussing plan of care with patient as well as directly providing post-discharge instructions  Additional time then spent on discharge activities  Discharge Medications:  See after visit summary for reconciled discharge medications provided to patient and family        ** Please Note: This note has been constructed using a voice recognition system **

## 2018-04-06 NOTE — EMTALA/ACUTE CARE TRANSFER
[address] [place] [id number]  Dept: [phone]      ACUTE CARE TRANSFER CONSENT    NAME Adonay Portillo                                         1955                              MRN [id number]    I have been informed of my rights regarding examination, treatment, and transfer   by Dr Daleen Bence, MD    Benefits:   cardiac catheterization    Risks:   decompensation during transport      Consent for Transfer:  I acknowledge that my medical condition has been evaluated and explained to me by the treating physician or other qualified medical person and/or my attending physician, who has recommended that I be transferred to the service of   Dr Nan Vieyra at   [facility]  The above potential benefits of such transfer, the potential risks associated with such transfer, and the probable risks of not being transferred have been explained to me, and I fully understand them  The doctor has explained that, in my case, the benefits of transfer outweigh the risks  I agree to be transferred  I authorize the performance of emergency medical procedures and treatments upon me in both transit and upon arrival at the receiving facility  Additionally, I authorize the release of any and all medical records to the receiving facility and request they be transported with me, if possible  I understand that the safest mode of transportation during a medical emergency is an ambulance and that the Hospital advocates the use of this mode of transport  Risks of traveling to the receiving facility by car, including absence of medical control, life sustaining equipment, such as oxygen, and medical personnel has been explained to me and I fully understand them  (MARY CORRECT BOX BELOW)  [  ]  I consent to the stated transfer and to be transported by ambulance/helicopter    [  ]  I consent to the stated transfer, but refuse transportation by ambulance and accept full responsibility for my transportation by car  I understand the risks of non-ambulance transfers and I exonerate the Hospital and its staff from any deterioration in my condition that results from this refusal     X___________________________________________    DATE  18  TIME________  Signature of patient or legally responsible individual signing on patient behalf           RELATIONSHIP TO PATIENT_________________________          Provider Certification    NAME Sunita Damon                                         1955                              MRN [id number]    A medical screening exam was performed on the above named patient  Based on the examination:    Condition Necessitating Transfer NSTEMI    Patient Condition:   stable    Reason for Transfer:   cardiac catheterization    Transfer Requirements: [facility]  · Space available and qualified personnel available for treatment as acknowledged by    · Agreed to accept transfer and to provide appropriate medical treatment as acknowledged by          · Appropriate medical records of the examination and treatment of the patient are provided at the time of transfer   [address], [address] _______  · Transfer will be performed by qualified personnel from    and appropriate transfer equipment as required, including the use of necessary and appropriate life support measures      Provider Certification: I have examined the patient and explained the following risks and benefits of being transferred/refusing transfer to the patient/family:    risks include decompensation during transport benefits are cardiac catheterization for NSTEMI      Based on these reasonable risks and benefits to the patient and/or the unborn child(bl), and based upon the information available at the time of the patients examination, I certify that the medical benefits reasonably to be expected from the provision of appropriate medical treatments at another medical facility outweigh the increasing risks, if any, to the individuals medical condition, and in the case of labor to the unborn child, from effecting the transfer      X____________________________________________ DATE 04/06/18        TIME_______      ORIGINAL - SEND TO MEDICAL RECORDS   COPY - SEND WITH PATIENT DURING TRANSFER

## 2018-04-06 NOTE — PROGRESS NOTES
Gave pt borisen education on myocardial infarction, cardiac catheterization, and troponin levels  Pt appears to be coping very well with her condition and status  Pt is eager to learn and understand what all is going on with her body and what comes next  Pt is aware that her entire life has changed, "just like that"  Pt is worried about her mother-in-law as her (the pt) and her  are the sole caregivers  Pt states, "my  and I haven't even been able to go out to eat alone for a year and a half, our sole focus is her and her health "

## 2018-04-07 ENCOUNTER — APPOINTMENT (INPATIENT)
Dept: NON INVASIVE DIAGNOSTICS | Facility: HOSPITAL | Age: 63
DRG: 281 | End: 2018-04-07
Payer: COMMERCIAL

## 2018-04-07 VITALS
SYSTOLIC BLOOD PRESSURE: 156 MMHG | WEIGHT: 253.97 LBS | TEMPERATURE: 97.9 F | DIASTOLIC BLOOD PRESSURE: 88 MMHG | HEIGHT: 65 IN | OXYGEN SATURATION: 95 % | BODY MASS INDEX: 42.31 KG/M2 | RESPIRATION RATE: 18 BRPM | HEART RATE: 76 BPM

## 2018-04-07 LAB
ANION GAP SERPL CALCULATED.3IONS-SCNC: 6 MMOL/L (ref 4–13)
BUN SERPL-MCNC: 10 MG/DL (ref 5–25)
CALCIUM SERPL-MCNC: 8.6 MG/DL (ref 8.3–10.1)
CHLORIDE SERPL-SCNC: 109 MMOL/L (ref 100–108)
CO2 SERPL-SCNC: 26 MMOL/L (ref 21–32)
CREAT SERPL-MCNC: 0.66 MG/DL (ref 0.6–1.3)
ERYTHROCYTE [DISTWIDTH] IN BLOOD BY AUTOMATED COUNT: 12.9 % (ref 11.6–15.1)
GFR SERPL CREATININE-BSD FRML MDRD: 96 ML/MIN/1.73SQ M
GLUCOSE SERPL-MCNC: 101 MG/DL (ref 65–140)
HCT VFR BLD AUTO: 38 % (ref 34.8–46.1)
HGB BLD-MCNC: 12.5 G/DL (ref 11.5–15.4)
MCH RBC QN AUTO: 29.7 PG (ref 26.8–34.3)
MCHC RBC AUTO-ENTMCNC: 32.9 G/DL (ref 31.4–37.4)
MCV RBC AUTO: 90 FL (ref 82–98)
PLATELET # BLD AUTO: 181 THOUSANDS/UL (ref 149–390)
PMV BLD AUTO: 10.2 FL (ref 8.9–12.7)
POTASSIUM SERPL-SCNC: 3.6 MMOL/L (ref 3.5–5.3)
RBC # BLD AUTO: 4.21 MILLION/UL (ref 3.81–5.12)
SODIUM SERPL-SCNC: 141 MMOL/L (ref 136–145)
WBC # BLD AUTO: 9.73 THOUSAND/UL (ref 4.31–10.16)

## 2018-04-07 PROCEDURE — 90686 IIV4 VACC NO PRSV 0.5 ML IM: CPT | Performed by: NURSE PRACTITIONER

## 2018-04-07 PROCEDURE — 93306 TTE W/DOPPLER COMPLETE: CPT | Performed by: INTERNAL MEDICINE

## 2018-04-07 PROCEDURE — 99239 HOSP IP/OBS DSCHRG MGMT >30: CPT | Performed by: INTERNAL MEDICINE

## 2018-04-07 PROCEDURE — 80048 BASIC METABOLIC PNL TOTAL CA: CPT | Performed by: INTERNAL MEDICINE

## 2018-04-07 PROCEDURE — 93306 TTE W/DOPPLER COMPLETE: CPT

## 2018-04-07 PROCEDURE — 85027 COMPLETE CBC AUTOMATED: CPT | Performed by: NURSE PRACTITIONER

## 2018-04-07 RX ORDER — ATORVASTATIN CALCIUM 40 MG/1
40 TABLET, FILM COATED ORAL EVERY EVENING
Qty: 30 TABLET | Refills: 1 | Status: SHIPPED | OUTPATIENT
Start: 2018-04-07

## 2018-04-07 RX ORDER — ASPIRIN 81 MG/1
81 TABLET, CHEWABLE ORAL DAILY
Status: DISCONTINUED | OUTPATIENT
Start: 2018-04-07 | End: 2018-04-07 | Stop reason: HOSPADM

## 2018-04-07 RX ORDER — AMLODIPINE BESYLATE 5 MG/1
5 TABLET ORAL DAILY
Qty: 30 TABLET | Refills: 1 | Status: SHIPPED | OUTPATIENT
Start: 2018-04-07

## 2018-04-07 RX ORDER — LISINOPRIL 10 MG/1
10 TABLET ORAL DAILY
Status: DISCONTINUED | OUTPATIENT
Start: 2018-04-08 | End: 2018-04-07 | Stop reason: HOSPADM

## 2018-04-07 RX ORDER — AMLODIPINE BESYLATE 5 MG/1
5 TABLET ORAL DAILY
Status: DISCONTINUED | OUTPATIENT
Start: 2018-04-07 | End: 2018-04-07 | Stop reason: HOSPADM

## 2018-04-07 RX ORDER — ASPIRIN 81 MG/1
81 TABLET, CHEWABLE ORAL DAILY
Qty: 30 TABLET | Refills: 1 | Status: SHIPPED | OUTPATIENT
Start: 2018-04-07

## 2018-04-07 RX ORDER — LISINOPRIL 10 MG/1
10 TABLET ORAL DAILY
Qty: 30 TABLET | Refills: 1 | Status: SHIPPED | OUTPATIENT
Start: 2018-04-08 | End: 2019-10-02 | Stop reason: ALTCHOICE

## 2018-04-07 RX ADMIN — PANTOPRAZOLE SODIUM 20 MG: 20 TABLET, DELAYED RELEASE ORAL at 05:01

## 2018-04-07 RX ADMIN — OXYCODONE HYDROCHLORIDE 5 MG: 5 TABLET ORAL at 04:57

## 2018-04-07 RX ADMIN — OXYCODONE HYDROCHLORIDE 5 MG: 5 TABLET ORAL at 14:55

## 2018-04-07 RX ADMIN — ASPIRIN 325 MG: 325 TABLET ORAL at 09:33

## 2018-04-07 RX ADMIN — AMLODIPINE BESYLATE 5 MG: 5 TABLET ORAL at 11:19

## 2018-04-07 RX ADMIN — ATORVASTATIN CALCIUM 40 MG: 40 TABLET, FILM COATED ORAL at 16:26

## 2018-04-07 RX ADMIN — METOPROLOL SUCCINATE 100 MG: 100 TABLET, EXTENDED RELEASE ORAL at 09:33

## 2018-04-07 RX ADMIN — OXYCODONE HYDROCHLORIDE 5 MG: 5 TABLET ORAL at 10:01

## 2018-04-07 RX ADMIN — INFLUENZA VIRUS VACCINE 0.5 ML: 15; 15; 15; 15 SUSPENSION INTRAMUSCULAR at 16:27

## 2018-04-07 RX ADMIN — LISINOPRIL 5 MG: 5 TABLET ORAL at 09:33

## 2018-04-07 RX ADMIN — DULOXETINE HYDROCHLORIDE 20 MG: 20 CAPSULE, DELAYED RELEASE ORAL at 09:33

## 2018-04-07 NOTE — PROGRESS NOTES
General Cardiology   Progress Note -  [employer]   Zoltan Mancia 65 y o  female MRN: [id number]    Unit/Bed#: [facility] 007-27 Encounter: [id number]        Subjective:    No significant events overnight  Migraine this a m  feels that the nitro paste applied yesterday quick down start things up as she does have a history of migraines  Oxycodone x2 tablets thus far  Pain is starting to lessen    Has been ambulatory without chest pain or shortness of breath  S/P cath last evening :CAD; 100 % distal LAD lesion  NO PCI due to distal vessel dissection/very small size  Review of Systems   Constitution: Negative for chills, fever and weakness  Cardiovascular: Negative for chest pain, claudication, cyanosis, dyspnea on exertion, irregular heartbeat, leg swelling, near-syncope, orthopnea, palpitations, paroxysmal nocturnal dyspnea and syncope  Respiratory: Negative for cough, shortness of breath and wheezing  Musculoskeletal:        No right hand dysesthesias or vascular changes   Neurological: Positive for headaches  All other systems reviewed and are negative  Objective:   Vitals: Blood pressure 113/63, pulse 74, temperature 98 1 °F (36 7 °C), temperature source Oral, resp  rate 18, height 5' 5" (1 651 m), weight 115 kg (253 lb 15 5 oz), SpO2 96 %  ,       Body mass index is 42 26 kg/m²  ,     Systolic (54PCU), ALFREDA:243 , Min:113 , KSR:269     Diastolic (61ZDZ), GJV:70, Min:62, Max:82          Intake/Output Summary (Last 24 hours) at 04/07/18 0955  Last data filed at 04/07/18 0926   Gross per 24 hour   Intake          1055 84 ml   Output                0 ml   Net          1055 84 ml     Weight (last 2 days)     Date/Time   Weight    04/06/18 1542  115 (253 97)                Telemetry Review: Sinus 70s    Physical Exam   Constitutional: She is oriented to person, place, and time  No distress  HENT:   Head: Normocephalic and atraumatic     Eyes: Conjunctivae and EOM are normal    Neck: Normal range of motion  Neck supple  Cardiovascular: Normal rate, regular rhythm, S1 normal, S2 normal and intact distal pulses  Exam reveals distant heart sounds  Pulses:       Radial pulses are 2+ on the right side  Right radial access site unremarkable  Pulmonary/Chest: Effort normal and breath sounds normal    Abdominal: Soft  Bowel sounds are normal    Musculoskeletal: Normal range of motion  She exhibits no edema  Neurological: She is alert and oriented to person, place, and time  Skin: Skin is warm and dry  She is not diaphoretic  Psychiatric: She has a normal mood and affect  Nursing note and vitals reviewed        LABORATORY RESULTS    Results from last 7 days  Lab Units 04/06/18  0739 04/05/18  2313 04/05/18  2047   TROPONIN I ng/mL 7 40* 6 31* 4 95*     CBC with diff:   Results from last 7 days  Lab Units 04/07/18  0443 04/05/18  1214   WBC Thousand/uL 9 73 13 07*   HEMOGLOBIN g/dL 12 5 14 4   HEMATOCRIT % 38 0 42 0   MCV fL 90 87   PLATELETS Thousands/uL 181 200   MCH pg 29 7 29 8   MCHC g/dL 32 9 34 3   RDW % 12 9 13 1   MPV fL 10 2 10 4       CMP:  Results from last 7 days  Lab Units 04/07/18  0443 04/06/18  0728 04/05/18  1214   SODIUM mmol/L 141 140 141   POTASSIUM mmol/L 3 6 3 7 3 6   CHLORIDE mmol/L 109* 104 103   CO2 mmol/L 26 26 31   ANION GAP mmol/L 6 10 7   BUN mg/dL 10 18 18   CREATININE mg/dL 0 66 0 88 0 93   GLUCOSE RANDOM mg/dL 101 135 170*   CALCIUM mg/dL 8 6 8 8 8 7   AST U/L  --  72* 17   ALT U/L  --  27 24   ALK PHOS U/L  --  81 87   TOTAL PROTEIN g/dL  --  6 5 7 3   BILIRUBIN TOTAL mg/dL  --  0 60 0 30   EGFR ml/min/1 73sq m 96 71 67       BMP:  Results from last 7 days  Lab Units 04/07/18  0443 04/06/18  0728 04/05/18  1214   SODIUM mmol/L 141 140 141   POTASSIUM mmol/L 3 6 3 7 3 6   CHLORIDE mmol/L 109* 104 103   CO2 mmol/L 26 26 31   BUN mg/dL 10 18 18   CREATININE mg/dL 0 66 0 88 0 93   GLUCOSE RANDOM mg/dL 101 135 170*   CALCIUM mg/dL 8 6 8 8 8 7       No results found for: NTBNP           Results from last 7 days  Lab Units 04/05/18  1658   INR  0 97       Lipid Profile:   Lab Results   Component Value Date    CHOL 211 (H) 04/06/2018    CHOL 182 01/23/2015     Lab Results   Component Value Date    HDL 47 04/06/2018    HDL 40 01/23/2015     Lab Results   Component Value Date    LDLCALC 127 (H) 04/06/2018    LDLCALC 115 (H) 01/23/2015     Lab Results   Component Value Date    TRIG 183 (H) 04/06/2018    TRIG 133 01/23/2015             Meds/Allergies   all current active meds have been reviewed and current meds:   Current Facility-Administered Medications   Medication Dose Route Frequency    acetaminophen (TYLENOL) tablet 650 mg  650 mg Oral Q4H PRN    aspirin tablet 325 mg  325 mg Oral Daily    atorvastatin (LIPITOR) tablet 40 mg  40 mg Oral QPM    DULoxetine (CYMBALTA) delayed release capsule 20 mg  20 mg Oral Daily    influenza inactivated quadrivalent vaccine (FLULAVAL) IM injection 0 5 mL  0 5 mL Intramuscular Prior to discharge    lisinopril (ZESTRIL) tablet 5 mg  5 mg Oral Daily    magnesium hydroxide (MILK OF MAGNESIA) 400 mg/5 mL oral suspension 30 mL  30 mL Oral Daily PRN    metoprolol succinate (TOPROL-XL) 24 hr tablet 100 mg  100 mg Oral Daily    morphine (PF) 4 mg/mL injection 4 mg  4 mg Intravenous Q4H PRN    nitroglycerin (NITROSTAT) SL tablet 0 4 mg  0 4 mg Sublingual Q5 Min PRN    ondansetron (ZOFRAN) injection 4 mg  4 mg Intravenous Q6H PRN    ondansetron (ZOFRAN-ODT) dispersible tablet 4 mg  4 mg Oral Q6H PRN    oxyCODONE (ROXICODONE) IR tablet 5 mg  5 mg Oral Q4H PRN    pantoprazole (PROTONIX) EC tablet 20 mg  20 mg Oral Early Morning     Prescriptions Prior to Admission   Medication    albuterol (PROVENTIL HFA,VENTOLIN HFA) 90 mcg/act inhaler    albuterol (VENTOLIN HFA) 90 mcg/act inhaler    cetirizine (ZYRTEC ALLERGY) 10 mg tablet    DULoxetine (CYMBALTA) 20 mg capsule    lisinopril-hydrochlorothiazide (PRINZIDE,ZESTORETIC) 20-25 MG per tablet    metoprolol succinate (TOPROL-XL) 100 mg 24 hr tablet    omeprazole (PRILOSEC OTC) 20 MG tablet    traMADol (ULTRAM) 50 mg tablet            Assessment:  Active Problems:    Essential hypertension    NSTEMI (non-ST elevated myocardial infarction) ([facility] )    Morbid obesity due to excess calories (HCC)    Anxiety      Assessment  NSTEMI type 1  CAD/Cath showed single vessel LAD disease- 100% distal lesion not amenable to PCI  HTN  /69  Preserved LV function  Migraine H/A-per her usual  likley induced by a nitrate  improvimng    Plan  Continue ASA, statin, BB ( on as OP)  and ACE-I  Await the echocardiogram  Adjust meds: As an OP, was on zesteretic 20/25  currently on lisinopril 5 mg  Will increase lisinopril to 10 mg/d and add amlodipine 5 mg a day as an antianginal  Will ask her to ambulate about the unit  Will reassess later- she may be acceptable for discharge later    Counseling / Coordination of Care  Total floor / unit time spent today 25 minutes  Greater than 50% of total time was spent with the patient and / or family counseling and / or coordination of care  ** Please Note: Dragon 360 Dictation voice to text software may have been used in the creation of this document   **

## 2018-04-07 NOTE — DISCHARGE INSTRUCTIONS
1  Please see the post cardiac catheterization/ angioseal closure device instructions  No heavy lifting, greater than 10 lbs  or strenuous  activity for 48 hrs  See the post cardiac catheterization/ angioseal closure device instructions  2 Remove band aid tomorrow  Shower and wash area- wrist gently with soap and water- beginning tomorrow  Rinse and pat dry  Apply new water seal band aid  Repeat this process for 5 days  No powders, creams lotions or antibiotic ointments  for 5 days  No tub baths, hot tubs or swimming for 5 days  3 No driving for 2 days      CW

## 2018-04-09 NOTE — CASE MANAGEMENT
Notification of Inpatient Admission/Inpatient Authorization Request  This is a Notification of Inpatient Admission/Request for Inpatient Authorization to our facility [facility]  Please be advised that this patient is currently in our facility under Inpatient Status  Below you will find the Attending Physician and Facilitys information including NPI# and contact information for the Utilization  assigned to the [facility] where the patient is receiving services  Please feel free to contact the Utilization Review Department with any questions  Patient Information:  PATIENT NAME: Moises Miranda  MRN: [id number]  YOB: 1955    PRESENTATION DATE: 4/6/2018  3:11 PM  IP ADMISSION DATE: 4/6/18 1511  DISCHARGE DATE: 4/7/2018  5:23 PM   DISPOSITION: Home/Self Care    Attending Physician:    FRANCISCO Kennedy  Specialty- Cardiology,   Medicare Number- [id number]  Medicaid Number - [id number]  [facility] Number - None  National Practioner ID- [id number]     Primary Office:  [address] [place] [address], [facility]  Phone 1: [phone]  Phone 2:   Fax: [phone]           Facility:  [address] [phone]  NPI: [id number]  TAX ID# [id number]  MEDICARE ID: [id number]    [facility] in the [employer] by Luther Woodruff for 2017  Network Utilization Review Department  Phone: [phone]; Fax [phone]  ATTENTION: The Network Utilization Review Department is now centralized for our 7 Facilities  Make a note that we have a new phone and fax numbers for our Department  Please call with any questions or concerns to [phone] and carefully follow the prompts so that you are directed to the right person   All voicemails are confidential  Fax any determinations, approvals, denials, and requests for initial or continue stay review clinical to [phone]  Due to HIGH CALL volume, it would be easier if you could please send faxed requests to expedite your requests and in part, help us provide discharge notifications faster

## 2018-04-09 NOTE — CASE MANAGEMENT
Initial Clinical Review    Admission: Date/Time/Statement: 4/6/18 @ 1511     Orders Placed This Encounter   Procedures    Inpatient Admission     Standing Status:   Standing     Number of Occurrences:   1     Order Specific Question:   Admitting Physician     Answer:   Kashif Fernando [[id number]]     Order Specific Question:   Level of Care     Answer:   Med Surg [[id number]]     Order Specific Question:   Estimated length of stay     Answer:   More than 2 Midnights     Order Specific Question:   Certification     Answer:   I certify that inpatient services are medically necessary for this patient for a duration of greater than two midnights  See H&P and MD Progress Notes for additional information about the patient's course of treatment  ED: Date/Time/Mode of Arrival:  Tx from [address] 4/6    Chief Complaint: NSTEMI    History of Illness: 65y o  year old female who presented to [address] [place] [address] ED with sharp stabbing CP that was noted across the precordium, wirthout radiation, associated with diapohoresis and in the setting of a lot of stress at home with her mother in law who has advanced dementia  Her ECG showed nonspecific T wav changes anteriorly  Her troponins have risen from 1-7  This is consistent with NSTEMI considering the symptoms  She had severe accerleated HTN on admission but much lower now, likely nitially driven by pain and relieved with nitro paste  She has no pain currently  She carries a diagnosis of obesty and HTN  She is on a robust antihypertensive regimen and BP is normally well controlled  She is unsure about her lipids status            ED Vital Signs:   ED Triage Vitals [04/06/18 1542]   Temperature Pulse Respirations Blood Pressure SpO2   98 5 °F (36 9 °C) 71 18 143/66 94 %      Temp Source Heart Rate Source Patient Position - Orthostatic VS BP Location FiO2 (%)   Oral Monitor Lying Left arm --      Pain Score       7        Wt Readings from Last 1 Encounters: 04/06/18 115 kg (253 lb 15 5 oz)       Vital Signs (abnormal): WNL  98--70--18--153/86  Pox 96% RA    Abnormal Labs/Diagnostic Test Results: WBC 13 07, Glucose 170    Lab Units 04/06/18  0739 04/05/18  2313 04/05/18  2047   TROPONIN I ng/mL 7 40* 6 31* 4 95*     EKG: SR, nonspecific T wave abn  Telemetry reviewed personally: SR       Imaging:   X-ray Chest 2 Views -- Result Date: 4/5/2018  Impression: No acute cardiopulmonary disease    Past Medical/Surgical History:   Past Medical History:   Diagnosis Date    Anxiety     GERD (gastroesophageal reflux disease)     Hypertension     Migraine        Admitting Diagnosis: NSTEMI (non-ST elevated myocardial infarction) ([facility] ) [I21 4]    Age/Sex: 65 y o  female    Assessment/Plan:   Assessment:   Principal Problem:    NSTEMI (non-ST elevated myocardial infarction) ([facility] )  Active Problems:    Essential hypertension    Morbid obesity due to excess calories ([place] [address] )    Anxiety        Plan:      Appropriate care has been established already  She is on aspirin, metoprolol, heparin drip, high-intensity statin  Her blood pressure is very nicely controlled on her home regimen at this time, but she did come in with accelerated hypertension  She will be referred for cardiac catheterization, and I would advise discontinuation of lisinopril/HCTZ especially with blood pressures now in the low 100s  Risks and benefits of the procedure were described to the patient and her   They include but are not limited to Stroke, MI, bleeding, kidney injury, dye reaction, anaphylaxis  She has no known IV dye allergy but has had a reraction to gadolinium in the past    She will be transferred to [facility]  She would like to follow up with me in [place] at follow up      Cardiac catheterization reviewed  Patient had a distal 100% occlusion of her LAD  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    This was a very small portion of the LAD, and medical management was all that was recommended  LV function by LV gram was normal      Admission Orders:  Admit to M/S/Tele unit  Telem  Echo  Consult cardiology  Npo --- > cardiac cath  Post-cath --  Cardiac diet  SCD's  Hep sq q8h  Lisinopril 5 mg po daily  Amlodipine 5 mg po daily  Asa 81 mg po daily  Atorvastatin 40 mg po daily  Metoprolol 100 mg po daily  Pantoprazole 20 mg po daily  Oxycodone 5 mg po x3      4/7 --  Assessment  NSTEMI type 1  CAD/Cath showed single vessel LAD disease- 100% distal lesion not amenable to PCI  HTN  /69  Preserved LV function  Migraine H/A-per her usual  likley induced by a nitrate  improvimng     Plan  Continue ASA, statin, BB ( on as OP)  and ACE-I  Await the echocardiogram  Adjust meds: As an OP, was on zesteretic 20/25  currently on lisinopril 5 mg  Will increase lisinopril to 10 mg/d and add amlodipine 5 mg a day as an antianginal  Will ask her to ambulate about the unit  Will reassess later- she may be acceptable for discharge later       Thank you,  [address] [place] [address] in the [facility] by Luther Woodruff for 2017  Network Utilization Review Department  Phone: [phone]; Fax [phone]  ATTENTION: The Network Utilization Review Department is now centralized for our 7 Facilities  Make a note that we have a new phone and fax numbers for our Department  Please call with any questions or concerns to [phone] and carefully follow the prompts so that you are directed to the right person  All voicemails are confidential  Fax any determinations, approvals, denials, and requests for initial or continue stay review clinical to [phone]  Due to HIGH CALL volume, it would be easier if you could please send faxed requests to expedite your requests and in part, help us provide discharge notifications faster

## 2018-04-24 ENCOUNTER — OFFICE VISIT (OUTPATIENT)
Dept: CARDIOLOGY CLINIC | Facility: CLINIC | Age: 63
End: 2018-04-24
Payer: COMMERCIAL

## 2018-04-24 VITALS
HEART RATE: 72 BPM | HEIGHT: 65 IN | SYSTOLIC BLOOD PRESSURE: 112 MMHG | BODY MASS INDEX: 41.48 KG/M2 | DIASTOLIC BLOOD PRESSURE: 80 MMHG | WEIGHT: 249 LBS

## 2018-04-24 DIAGNOSIS — E66.01 MORBID OBESITY DUE TO EXCESS CALORIES (HCC): ICD-10-CM

## 2018-04-24 DIAGNOSIS — I10 ESSENTIAL HYPERTENSION: ICD-10-CM

## 2018-04-24 DIAGNOSIS — I21.4 NSTEMI (NON-ST ELEVATED MYOCARDIAL INFARCTION) (HCC): Primary | ICD-10-CM

## 2018-04-24 DIAGNOSIS — I25.10 ATHEROSCLEROSIS OF NATIVE CORONARY ARTERY OF NATIVE HEART WITHOUT ANGINA PECTORIS: ICD-10-CM

## 2018-04-24 PROCEDURE — 99215 OFFICE O/P EST HI 40 MIN: CPT | Performed by: INTERNAL MEDICINE

## 2018-04-24 RX ORDER — NITROGLYCERIN 0.4 MG/1
0.4 TABLET SUBLINGUAL
Qty: 90 TABLET | Refills: 0 | Status: SHIPPED | OUTPATIENT
Start: 2018-04-24 | End: 2021-04-14 | Stop reason: SDUPTHER

## 2018-04-24 NOTE — PATIENT INSTRUCTIONS
What Are the Benefits of Omega-3 Fatty Acids? Eating fats that provide omega-3 fatty acids may reduce risk of heart disease  There are three types of omega-3 fatty acids:  Alpha linolenic acid: This is in some vegetable oils, nuts, seeds, and soy foods  EPA and DHA: These are both found in fatty fish (salmon, tuna, mackerel, and sardines)  EPA and DHA are also known as long-chain omega-3 fatty acids   They seem to provide the most benefit for the heart  Tips for Adding Omega-3 Fatty Acids to Your Meal Plan  Select oils that provide omega-3 fat, such as canola oil or soybean oil  Add flaxseed oil, which is very high in omega-3 fat, to foods  If you use fresh flaxseed, is ground in a  for use  Your body cannot digest the beneficial fat if the seeds are left whole  Enjoy walnuts  The walnut is the only common nut with alpha linolenic acid  Try walnut oil in salad dressings  Have two 4-ounce portions of fatty fish weekly  Enjoy salmon, albacore tuna, mackerel, or sardines  Try new ways to cook fish  Remember that fish cooks quickly  Try poaching it in an orange juice and herb mixture  Or bake fish with vegetables  Read the labels on egg cartons and choose eggs that are high in omega-3 fatty acids  These eggs have more omega-3 fats than regular eggs because of the type of feed given to the chickens that lay them  Remember, however, that all egg yolks contain cholesterol  Fish Oil Supplements  If you wish to take fish oil supplements, the American Heart Association recommends that people with heart disease get 1 gram of omega-3 fatty acids from a combination of EPA and DHA per day  Copyright © Academy of Nutrition and Dietetics  For individual nutrition counseling get a referral from your Doctor   and call [phone]-ZGGKXWF(option2)

## 2018-04-24 NOTE — PROGRESS NOTES
Follow-up - Cardiology   Maryellen Albarran 65 y o  female MRN: [id number]        Problems    1  NSTEMI (non-ST elevated myocardial infarction) [facility])  Ambulatory  Referral to Cardiac Rehabilitation   2  Essential hypertension     3  Morbid obesity due to excess calories ([address] )     4  Atherosclerosis of native coronary artery of native heart without angina pectoris  nitroglycerin (NITROSTAT) 0 4 mg SL tablet    Basic metabolic panel    Lipid panel    NM myocardial perfusion spect (stress and/or rest)     Impression     Corrie Child had a myocardial infarction with discovery of severe stenosis with some mild dissection of the very distal LAD territory, no other significant proximal disease  Echocardiogram revealed apical septal hypokinesis but preserved LV function  At this point she has had twinges of chest discomfort but nothing that sounds like true recurrent angina  Her blood pressure is under excellent control with the addition of amlodipine, HCTZ was stopped  She does take metoprolol and lisinopril currently  Her lipids were uncontrolled but she is currently on high-intensity statin therapy with Lipitor 40 mg  Her initial total cholesterol was 211, mildly elevated triglycerides, and LDL was 127  I spent a majority of this visit discussing lifestyle modification, exercise restrictions at this time, follow-up testing requirements, discussing referral to cardiac rehab and discussing dietary modification  A total of 40 min was spent on the visit today, more than 50% of it was counseling      Plan      Lipids and a basic metabolic panel in 3 months  Follow-up with me in 3 months      Stress Myoview for risk stratification post myocardial infarction with an on intervened distal LAD territory stenosis  Referral to cardiac rehab if her stress Myoview shows no signs of residual ischemia     if she does have signs of residual ischemia, will need to add isosorbide mononitrate to her medical regimen and then will consider released to cardiac rehab for slow titration of exercise in the setting of anti anginal therapy  Dietary supplementation with omega-3  Handout was provided   dietary restriction in terms of milk fat, meet fat, red meat etcetera  HPI: Zoltan Mancia is a 65y o  year old female  With a history of hyperlipidemia, hypertension, office based hypertension, family history of CAD and angina, morbid obesity presents for a follow-up visit having recently had a myocardial infarction with discovery of severe distal LAD stenosis/minor dissection not amenable to percutaneous intervention due to the small nature of the vessel  She did have a correlating area of hypokinesis of the apical septum on her echocardiogram   Her symptoms were chest discomfort, somewhat sharp, with some neck and jaw discomfort as well  Peak troponin was about 7  Her lipids were found to be abnormal with an LDL of 127, total cholesterol 211, triglycerides 183, her HCTZ was discontinued, it was replaced with amlodipine  She complains of occasional twinges of chest discomfort but nothing like she had when she had her anginal presentation  She was started on appropriate antiplatelet therapy, her blood pressure is under excellent control currently, she denies any significant bruising, she was started on high-dose atorvastatin therapy with good tolerance thus far  Review of Systems   Cardiovascular: Positive for chest pain  All other systems reviewed and are negative          Past Medical History:   Diagnosis Date    Anxiety     GERD (gastroesophageal reflux disease)     Hypertension     Migraine      History   Alcohol Use    Yes     Comment: occassionally     History   Drug Use No     History   Smoking Status    Never Smoker   Smokeless Tobacco    Never Used       Allergies:  No Known Allergies    Medications:     Current Outpatient Prescriptions:     albuterol (PROVENTIL HFA,VENTOLIN HFA) 90 mcg/act inhaler, Inhale 2 puffs as needed, Disp: , Rfl:     amLODIPine (NORVASC) 5 mg tablet, Take 1 tablet (5 mg total) by mouth daily, Disp: 30 tablet, Rfl: 1    aspirin 81 mg chewable tablet, Chew 1 tablet (81 mg total) daily, Disp: 30 tablet, Rfl: 1    atorvastatin (LIPITOR) 40 mg tablet, Take 1 tablet (40 mg total) by mouth every evening, Disp: 30 tablet, Rfl: 1    cetirizine (ZYRTEC ALLERGY) 10 mg tablet, Take 1 tablet by mouth daily as needed, Disp: , Rfl:     DULoxetine (CYMBALTA) 20 mg capsule, daily  , Disp: , Rfl:     lisinopril (ZESTRIL) 10 mg tablet, Take 1 tablet (10 mg total) by mouth daily, Disp: 30 tablet, Rfl: 1    metoprolol succinate (TOPROL-XL) 100 mg 24 hr tablet, Take 100 mg by mouth daily  , Disp: , Rfl:     omeprazole (PRILOSEC OTC) 20 MG tablet, Take 1 tablet by mouth daily, Disp: , Rfl:     nitroglycerin (NITROSTAT) 0 4 mg SL tablet, Place 1 tablet (0 4 mg total) under the tongue every 5 (five) minutes as needed for chest pain, Disp: 90 tablet, Rfl: 0    traMADol (ULTRAM) 50 mg tablet, Take 50 mg by mouth every 6 (six) hours as needed for moderate pain (for migrain pain), Disp: , Rfl:       Vitals:    04/24/18 0925   BP: 112/80   Pulse: 72     Weight (last 2 days)     Date/Time   Weight    04/24/18 0925  113 (249)            Physical Exam   Constitutional: She is oriented to person, place, and time  She appears well-developed and well-nourished  No distress  HENT:   Head: Normocephalic and atraumatic  Mouth/Throat: Oropharynx is clear and moist    Eyes: Conjunctivae and EOM are normal  Pupils are equal, round, and reactive to light  No scleral icterus  Neck: Normal range of motion  Neck supple  No tracheal deviation present  No thyromegaly present  Cardiovascular: Normal rate, regular rhythm, normal heart sounds and intact distal pulses  Exam reveals no gallop and no friction rub  No murmur heard  Pulmonary/Chest: Effort normal and breath sounds normal  No respiratory distress   She has no wheezes  She has no rales  Abdominal: Soft  Bowel sounds are normal  She exhibits no distension  There is no tenderness  Musculoskeletal: Normal range of motion  She exhibits no edema, tenderness or deformity  Neurological: She is alert and oriented to person, place, and time  No cranial nerve deficit  Skin: Skin is warm and dry  No rash noted  She is not diaphoretic  No erythema  Psychiatric: She has a normal mood and affect  Judgment normal          Laboratory Studies:  CMP:    NT-proBNP: No results found for: NTBNP   Coags:    Lipid Profile:   Lab Results   Component Value Date    CHOL 211 (H) 2018     Lab Results   Component Value Date    HDL 47 2018     Lab Results   Component Value Date    LDLCALC 127 (H) 2018     Lab Results   Component Value Date    TRIG 183 (H) 2018       Cardiac testing: The below studies were personally reviewed by myself today  Results for orders placed during the hospital encounter of 18   Echo complete with contrast if indicated    [address] [facility], [address] [facility]  [phone]    Transthoracic Echocardiogram  2D, M-mode, Doppler, and Color Doppler    Study date:  2018    Patient: Francesca Cabral  MR number: [id number]  Account number: [de-identified]  : 1955  Age: 65 years  Gender: Female  Status: Inpatient  Location: Bedside  Height: 65 in  Weight: 252 6 lb  BP: 153/ 86 mmHg    Indications: MI    Diagnoses: I21 3 - ST elevation (STEMI) myocardial infarction of unspecified site    Sonographer:  FAVIOLA Shell  Primary Physician:  Shima Lester  Referring Physician:  MARKO Loera  Group:  [facility]  Interpreting Physician:  Nivia Crandall MD    SUMMARY    LEFT VENTRICLE:  Systolic function was normal  Ejection fraction was estimated to be 60 %  There was severe hypokinesis of the apical wall(s)    Wall thickness was at the upper limits of normal     MITRAL VALVE:  There was mild regurgitation  AORTIC VALVE:  There was mild regurgitation  TRICUSPID VALVE:  There was trace regurgitation  HISTORY: PRIOR HISTORY: HTN,Obesity    PROCEDURE: The procedure was performed at the bedside  This was a routine study  The transthoracic approach was used  The study included complete 2D imaging, M-mode, complete spectral Doppler, and color Doppler  Image quality was adequate  LEFT VENTRICLE: Size was normal  Systolic function was normal  Ejection fraction was estimated to be 60 %  There was severe hypokinesis of the apical wall(s)  Wall thickness was at the upper limits of normal  No evidence of apical  thrombus  DOPPLER: Left ventricular diastolic function parameters were normal     RIGHT VENTRICLE: The size was normal  Systolic function was normal  Wall thickness was normal     LEFT ATRIUM: Size was at the upper limits of normal     RIGHT ATRIUM: Size was normal     MITRAL VALVE: Valve structure was normal  There was normal leaflet separation  DOPPLER: The transmitral velocity was within the normal range  There was no evidence for stenosis  There was mild regurgitation  AORTIC VALVE: The valve was trileaflet  Leaflets exhibited normal thickness, normal cuspal separation, and sclerosis  DOPPLER: Transaortic velocity was within the normal range  There was no evidence for stenosis  There was mild  regurgitation  TRICUSPID VALVE: The valve structure was normal  There was normal leaflet separation  DOPPLER: There was trace regurgitation  PULMONIC VALVE: Leaflets exhibited normal thickness, no calcification, and normal cuspal separation  DOPPLER: The transpulmonic velocity was within the normal range  There was no significant regurgitation  PERICARDIUM: There was no pericardial effusion  The pericardium was normal in appearance  AORTA: The root exhibited normal size      SYSTEMIC VEINS: IVC: The inferior vena cava was normal in size     PULMONARY VEINS: DOPPLER: Doppler signals were not recordable in the pulmonary vein(s)  SYSTEM MEASUREMENT TABLES    2D  %FS: 34 2 %  Ao Diam: 3 39 cm  Ao asc: 3 41 cm  EDV(Teich): 109 42 ml  EF(Cube): 71 51 %  EF(Teich): 63 07 %  ESV(Cube): 32 22 ml  ESV(Teich): 40 41 ml  IVSd: 1 1 cm  LA Area: 20 46 cm2  LA Diam: 4 49 cm  LAAd A2C: 23 41 cm2  LAAd A4C: 25 94 cm2  LAEDV A-L A2C: 76 95 ml  LAEDV A-L A4C: 94 56 ml  LAEDV Index (A-L): 38 97 ml/m2  LAEDV MOD A2C: 73 51 ml  LAEDV MOD A4C: 87 09 ml  LAEDV(A-L): 85 34 ml  LALd A2C: 6 05 cm  LALd A4C: 6 04 cm  LVIDd: 4 84 cm  LVIDs: 3 18 cm  LVPWd: 1 07 cm  RA Area: 15 79 cm2  SI(Cube): 36 94 ml/m2  SI(Teich): 31 51 ml/m2  SV(Cube): 80 89 ml  SV(Teich): 69 01 ml  rv diam: 3 15 cm    CW  AR Dec [place]: 2 16 m/s2  AR Dec Time: 2137 84 ms  AR PHT: 619 97 ms  AR Vmax: 4 62 m/s  AR maxP 42 mmHg  AV Vmax: 1 48 m/s  AV maxP 79 mmHg    MM  AV Cusp: 2 34 cm  Ao Diam: 3 62 cm  LA Diam: 4 4 cm  LA/Ao: 1 22  TAPSE: 2 2 cm    PW  E': 0 08 m/s  E/E': 13 37  LVOT Vmax: 0 85 m/s  LVOT maxP 9 mmHg  MV A Dur: 118 3 ms  MV A Danilo: 0 75 m/s  MV Dec [place]: 4 68 m/s2  MV DecT: 220 54 ms  MV E Danilo: 1 03 m/s  MV E/A Ratio: 1 38    [employer] Accredited Echocardiography [facility]    Prepared and electronically signed by    Evita Ortiz MD  Signed 2018 15:47:00       No results found for this or any previous visit    Results for orders placed during the hospital encounter of 18   Cardiac catheterization    [address], [address]  [phone]    [facility]    Invasive Cardiovascular Lab Complete Report    Patient: Ovi Polanco  MR number: [id number]  Account number: [de-identified]  Study date: 2018  Gender: Female  : 1955  Height: 65 in  Weight: 255 2 lb  BSA: 2 2 m squared    Allergies: NO KNOWN ALLERGIES    Diagnostic Cardiologist:  Manisha Lin MD  Primary Physician:  Abbe Alfonso    SUMMARY    CORONARY CIRCULATION:  Distal LAD: There was a 100 % stenosis  There was THEO grade 1 flow through the vessel (slow flow without perfusion)  This lesion is a likely culprit for the patient's recent myocardial infarction  secondary to dissection    CARDIAC STRUCTURES:  Analysis of regional contractile function demonstrated apical akinesis  Global left ventricular function was hypercontractile  EF calculated by contrast ventriculography was 75 %  HEMODYNAMICS:  Hemodynamic assessment demonstrated mildly elevated LVEDP  INDICATIONS:  --  Possible CAD: myocardial infarction without ST elevation (NSTEMI)  PROCEDURES PERFORMED    --  Left heart catheterization with ventriculography  --  Left coronary angiography  --  Right coronary angiography  --  Inpatient  --  Mod Sedation Same Physician Initial 15min  --  Coronary Catheterization (w/ LHC)  PROCEDURE: The risks and alternatives of the procedures and conscious sedation were explained to the patient and informed consent was obtained  The patient was brought to the cath lab and placed on the table  The planned puncture sites  were prepped and draped in the usual sterile fashion  --  Right radial artery access  After performing an Sammy's test to verify adequate ulnar artery supply to the hand, the radial site was prepped  The puncture site was infiltrated with local anesthetic  The vessel was accessed using the  modified Seldinger technique, a wire was advanced into the vessel, and a sheath was advanced over the wire into the vessel  --  Left heart catheterization with ventriculography  A catheter was advanced over a guidewire into the ascending aorta  After recording ascending aortic pressure, the catheter was advanced across the aortic valve and left ventricular  pressure was recorded  Ventriculography was performed   The catheter was pulled back across the aortic valve and into the ascending aorta and pullback pressures were obtained  --  Left coronary artery angiography  A catheter was advanced over a guidewire into the aorta and positioned in the left coronary artery ostium under fluoroscopic guidance  Angiography was performed  --  Right coronary artery angiography  A catheter was advanced over a guidewire into the aorta and positioned in the right coronary artery ostium under fluoroscopic guidance  Angiography was performed  --  Inpatient  --  Mod Sedation Same Physician Initial 15min  --  Coronary Catheterization (w/ LH)  PROCEDURE COMPLETION: The patient tolerated the procedure well and was discharged from the cath lab  TIMING: Test started at 17:13  Test concluded at 17:28  HEMOSTASIS: The sheath was removed  The site was compressed with a Hemoband  device  Hemostasis was obtained  MEDICATIONS GIVEN: Fentanyl (1OOmcg/2 ml), 50 mcg, IV, at 17:10  Versed (2mg/2ml), 2 mg, IV, at 17:10  1% Lidocaine, 2 ml, subcutaneously, at 17:14  Fentanyl (1OOmcg/2 ml), 50 mcg, IV, at 17:15  Versed  (2mg/2ml), 1 mg, IV, at 17:15  Nitroglycerin (200mcg/ml), 200 mcg, at 17:15  Verapamil (5mg/2ml), 2 5 mg, IV, at 17:15  Heparin 1000 units/ml, 4,000 units, IV, at 17:16  CONTRAST GIVEN: 75 ml Omnipaque (350mg I /ml)  RADIATION EXPOSURE:  Fluoroscopy time: 4 03 min  HEMODYNAMICS: Hemodynamic assessment demonstrated mildly elevated LVEDP  VENTRICLES:   --  Analysis of regional contractile function demonstrated apical akinesis  Global left ventricular function was hypercontractile  EF calculated by contrast ventriculography was 75 %  VALVES:  AORTIC VALVE:   --  There was no aortic stenosis  MITRAL VALVE:   --  The mitral valve exhibited no regurgitation  CORONARY VESSELS:   --  The coronary circulation is right dominant  --  Left main: The vessel was medium to large sized  Angiography showed no evidence of disease  --  LAD: The vessel was medium to large sized    --  Distal LAD: There was a 100 % stenosis  There was THEO grade 1 flow through the vessel (slow flow without perfusion)  This lesion is a likely culprit for the patient's recent myocardial infarction  secondary to dissection  --  Circumflex: The vessel was small sized  Angiography showed no evidence of disease  There was one major obtuse marginal   --  RCA: The vessel was medium to large sized  Angiography showed no evidence of disease  The RCA vascularized the posterior and lateral walls  IMPRESSIONS:  There is significant single vessel coronary artery disease  RECOMMENDATIONS  The patient should continue with the present medications except as noted above  beta blocker plus ACEI  No intervention due to distal vessel dissection/very small size  usually adequate healing develops with expectant management    Prepared and signed by    Fanny Hayden MD  Signed 2018 17:37:46    Study diagram    Angiographic findings  Native coronary lesions:  ·Distal LAD: Lesion 1: 100 % stenosis  Hemodynamic tables    Pressures:  Baseline  Pressures:  - HR: 72  Pressures:  - Rhythm:  Pressures:  -- Aortic Pressure (S/D/M): 131/73/103  Pressures:  -- Left Ventricle (s/edp): 117/27/--    Outputs:  Baseline  Outputs:  -- CALCULATIONS: [age]  Outputs:  -- CALCULATIONS: Body Surface Area: 2 20  Outputs:  -- CALCULATIONS: Height in cm: 165 00  Outputs:  -- CALCULATIONS: Sex: Female  Outputs:  -- CALCULATIONS: Weight in k 00       No results found for this or any previous visit  Tammy Huff MD    Portions of the record may have been created with voice recognition software   Occasional wrong word or "sound a like" substitutions may have occurred due to the inherent limitations of voice recognition software   Read the chart carefully and recognize, using context, where substitutions have occurred

## 2018-04-30 ENCOUNTER — HOSPITAL ENCOUNTER (OUTPATIENT)
Dept: NON INVASIVE DIAGNOSTICS | Facility: CLINIC | Age: 63
Discharge: HOME | End: 2018-04-30

## 2018-05-07 ENCOUNTER — HOSPITAL ENCOUNTER (OUTPATIENT)
Dept: NON INVASIVE DIAGNOSTICS | Facility: CLINIC | Age: 63
Discharge: HOME | End: 2018-05-07

## 2018-09-05 ENCOUNTER — APPOINTMENT (OUTPATIENT)
Dept: LAB | Facility: CLINIC | Age: 63
End: 2018-09-05
Payer: COMMERCIAL

## 2018-09-05 DIAGNOSIS — I25.10 ATHEROSCLEROSIS OF NATIVE CORONARY ARTERY OF NATIVE HEART WITHOUT ANGINA PECTORIS: ICD-10-CM

## 2018-09-05 LAB
ANION GAP SERPL CALCULATED.3IONS-SCNC: 5 MMOL/L (ref 4–13)
BUN SERPL-MCNC: 17 MG/DL (ref 5–25)
CALCIUM SERPL-MCNC: 9 MG/DL (ref 8.3–10.1)
CHLORIDE SERPL-SCNC: 105 MMOL/L (ref 100–108)
CHOLEST SERPL-MCNC: 137 MG/DL (ref 50–200)
CO2 SERPL-SCNC: 29 MMOL/L (ref 21–32)
CREAT SERPL-MCNC: 0.74 MG/DL (ref 0.6–1.3)
GFR SERPL CREATININE-BSD FRML MDRD: 88 ML/MIN/1.73SQ M
GLUCOSE P FAST SERPL-MCNC: 107 MG/DL (ref 65–99)
HDLC SERPL-MCNC: 46 MG/DL (ref 40–60)
LDLC SERPL CALC-MCNC: 63 MG/DL (ref 0–100)
NONHDLC SERPL-MCNC: 91 MG/DL
POTASSIUM SERPL-SCNC: 3.8 MMOL/L (ref 3.5–5.3)
SODIUM SERPL-SCNC: 139 MMOL/L (ref 136–145)
TRIGL SERPL-MCNC: 141 MG/DL

## 2018-09-05 PROCEDURE — 80061 LIPID PANEL: CPT

## 2018-09-05 PROCEDURE — 80048 BASIC METABOLIC PNL TOTAL CA: CPT

## 2018-09-05 PROCEDURE — 36415 COLL VENOUS BLD VENIPUNCTURE: CPT

## 2018-09-13 ENCOUNTER — OFFICE VISIT (OUTPATIENT)
Dept: CARDIOLOGY CLINIC | Facility: CLINIC | Age: 63
End: 2018-09-13
Payer: COMMERCIAL

## 2018-09-13 VITALS
HEART RATE: 88 BPM | SYSTOLIC BLOOD PRESSURE: 120 MMHG | WEIGHT: 260 LBS | HEIGHT: 65 IN | BODY MASS INDEX: 43.32 KG/M2 | DIASTOLIC BLOOD PRESSURE: 70 MMHG

## 2018-09-13 DIAGNOSIS — I10 ESSENTIAL HYPERTENSION: ICD-10-CM

## 2018-09-13 DIAGNOSIS — E78.1 PURE HYPERGLYCERIDEMIA: ICD-10-CM

## 2018-09-13 DIAGNOSIS — E66.01 MORBID OBESITY DUE TO EXCESS CALORIES (HCC): ICD-10-CM

## 2018-09-13 DIAGNOSIS — I25.10 ATHEROSCLEROSIS OF NATIVE CORONARY ARTERY OF NATIVE HEART WITHOUT ANGINA PECTORIS: Primary | ICD-10-CM

## 2018-09-13 DIAGNOSIS — I25.2 OLD MYOCARDIAL INFARCTION: ICD-10-CM

## 2018-09-13 PROCEDURE — 99214 OFFICE O/P EST MOD 30 MIN: CPT | Performed by: INTERNAL MEDICINE

## 2018-09-13 RX ORDER — AMLODIPINE BESYLATE 2.5 MG/1
TABLET ORAL
COMMUNITY
Start: 2018-08-20 | End: 2019-10-02 | Stop reason: ALTCHOICE

## 2018-09-13 RX ORDER — FLUTICASONE PROPIONATE 50 MCG
1 SPRAY, SUSPENSION (ML) NASAL DAILY
COMMUNITY

## 2018-09-13 NOTE — PROGRESS NOTES
Follow-up - Cardiology   Coretta Huff 65 y o  female MRN: [id number]        Problems    1  Atherosclerosis of native coronary artery of native heart without angina pectoris     2  Essential hypertension     3  Old myocardial infarction     4  Pure hyperglyceridemia     5  Morbid obesity due to excess calories [facility])       Impression    CAD    Dissection/ occlusion of the distal LAD, no intervention performed 4/18  Small area of apical septal hypokinesis with preserved LV function at that time  No ongoing chronic angina  Currently stable on medications including metoprolol, lisinopril, amlodipine, atorvastatin, aspirin  Has not required any nitroglycerin     hypertension   under excellent control     hyperlipidemia   under excellent control with recent LDL 63  Continues on atorvastatin 40 mg  Plan    Current knee discomfort limits the ability to proceed with a treadmill Myoview stress test     We can reorder this test at a point when her knee improves  I think it is most important to have a exercise based stress test to assess symptoms related to her coronary artery disease  Routine labs in 6 months   Per PCPs office  At this point appears quite clinically stable from a cardiovascular standpoint  HPI: Coretta Huff is a 65y o  year old female   Who sustained a myocardial infarction of the anterior wall, distal LAD 4/18  Had some septal apical hypokinesis, but no intervention performed due to the distal small nature of the vessel  Treated medically with aspirin, statin, beta-blocker, calcium channel blocker  She has not required any nitroglycerin  She denies any significant typical exertional stable or unstable chest pain  She did not undergo the requested stress test for risk stratification  She was dealing with her sick mother lost that time, she had also hurt her knee at that time  She has been unable to do the treadmill    Her mother-in-law has since passed away but her Hard copy no longer available.  It has been shredded.  Routing to RN to call prescription in. Camila Rudolph,      knee issues persist   She does not think she can proceed with a stress test at this point either  Her blood pressure is very well controlled  Her cholesterol is under excellent control with recent LDL 63 and all medications are well tolerated at this time  Review of Systems   All other systems reviewed and are negative          Past Medical History:   Diagnosis Date    Anxiety     GERD (gastroesophageal reflux disease)     Hypertension     Migraine      History   Alcohol Use    Yes     Comment: occassionally     History   Drug Use No     History   Smoking Status    Never Smoker   Smokeless Tobacco    Never Used       Allergies:  No Known Allergies    Medications:     Current Outpatient Prescriptions:     albuterol (PROVENTIL HFA,VENTOLIN HFA) 90 mcg/act inhaler, Inhale 2 puffs as needed, Disp: , Rfl:     amLODIPine (NORVASC) 2 5 mg tablet, , Disp: , Rfl:     amLODIPine (NORVASC) 5 mg tablet, Take 1 tablet (5 mg total) by mouth daily, Disp: 30 tablet, Rfl: 1    aspirin 81 mg chewable tablet, Chew 1 tablet (81 mg total) daily, Disp: 30 tablet, Rfl: 1    atorvastatin (LIPITOR) 40 mg tablet, Take 1 tablet (40 mg total) by mouth every evening, Disp: 30 tablet, Rfl: 1    cetirizine (ZYRTEC ALLERGY) 10 mg tablet, Take 1 tablet by mouth daily as needed, Disp: , Rfl:     co-enzyme Q-10 30 MG capsule, Take 30 mg by mouth 3 (three) times a day, Disp: , Rfl:     DULoxetine (CYMBALTA) 20 mg capsule, daily  , Disp: , Rfl:     fluticasone (FLONASE) 50 mcg/act nasal spray, 1 spray into each nostril daily, Disp: , Rfl:     lisinopril (ZESTRIL) 10 mg tablet, Take 1 tablet (10 mg total) by mouth daily, Disp: 30 tablet, Rfl: 1    metoprolol succinate (TOPROL-XL) 100 mg 24 hr tablet, Take 100 mg by mouth daily  , Disp: , Rfl:     omeprazole (PRILOSEC OTC) 20 MG tablet, Take 1 tablet by mouth daily, Disp: , Rfl:     traMADol (ULTRAM) 50 mg tablet, Take 50 mg by mouth every 6 (six) hours as needed for moderate pain (for migrain pain), Disp: , Rfl:     nitroglycerin (NITROSTAT) 0 4 mg SL tablet, Place 1 tablet (0 4 mg total) under the tongue every 5 (five) minutes as needed for chest pain (Patient not taking: Reported on 2018 ), Disp: 90 tablet, Rfl: 0      Vitals:    18 0858   BP: 120/70   Pulse: 88     Weight (last 2 days)     Date/Time   Weight    18 0858  118 (260)            Physical Exam   Constitutional: No distress  HENT:   Head: Normocephalic and atraumatic  Eyes: Conjunctivae are normal  No scleral icterus  Neck: Normal range of motion  No JVD present  Cardiovascular: Normal rate, regular rhythm, normal heart sounds and intact distal pulses  No murmur heard  Pulmonary/Chest: Effort normal and breath sounds normal  No respiratory distress  She has no wheezes  She has no rales  Musculoskeletal: She exhibits no edema or tenderness  Skin: Skin is warm and dry  She is not diaphoretic           Laboratory Studies:  CMP:    NT-proBNP: No results found for: NTBNP   Coags:    Lipid Profile:   Lab Results   Component Value Date    CHOL 182 2015     Lab Results   Component Value Date    HDL 46 2018     Lab Results   Component Value Date    LDLCALC 63 2018     Lab Results   Component Value Date    TRIG 141 2018       Cardiac testing:     Results for orders placed during the hospital encounter of 18   Echo complete with contrast if indicated    Narrative [facility] [address]  [address]  [phone]    Transthoracic Echocardiogram  2D, M-mode, Doppler, and Color Doppler    Study date:  2018    Patient: Hair Bob  MR number: [id number]  Account number: [de-identified]  : 1955  Age: 65 years  Gender: Female  Status: Inpatient  Location: Bedside  Height: 65 in  Weight: 252 6 lb  BP: 153/ 86 mmHg    Indications: MI    Diagnoses: I21 3 - ST elevation (STEMI) myocardial infarction of unspecified site    Sonographer:  FAVIOLA Haynes  Primary Physician:  Chris Hardin  Referring Physician:  MARKO Campoverde  Group:  [id number]  Interpreting Physician:  Zhen Granados MD    SUMMARY    LEFT VENTRICLE:  Systolic function was normal  Ejection fraction was estimated to be 60 %  There was severe hypokinesis of the apical wall(s)  Wall thickness was at the upper limits of normal     MITRAL VALVE:  There was mild regurgitation  AORTIC VALVE:  There was mild regurgitation  TRICUSPID VALVE:  There was trace regurgitation  HISTORY: PRIOR HISTORY: HTN,Obesity    PROCEDURE: The procedure was performed at the bedside  This was a routine study  The transthoracic approach was used  The study included complete 2D imaging, M-mode, complete spectral Doppler, and color Doppler  Image quality was adequate  LEFT VENTRICLE: Size was normal  Systolic function was normal  Ejection fraction was estimated to be 60 %  There was severe hypokinesis of the apical wall(s)  Wall thickness was at the upper limits of normal  No evidence of apical  thrombus  DOPPLER: Left ventricular diastolic function parameters were normal     RIGHT VENTRICLE: The size was normal  Systolic function was normal  Wall thickness was normal     LEFT ATRIUM: Size was at the upper limits of normal     RIGHT ATRIUM: Size was normal     MITRAL VALVE: Valve structure was normal  There was normal leaflet separation  DOPPLER: The transmitral velocity was within the normal range  There was no evidence for stenosis  There was mild regurgitation  AORTIC VALVE: The valve was trileaflet  Leaflets exhibited normal thickness, normal cuspal separation, and sclerosis  DOPPLER: Transaortic velocity was within the normal range  There was no evidence for stenosis  There was mild  regurgitation  TRICUSPID VALVE: The valve structure was normal  There was normal leaflet separation  DOPPLER: There was trace regurgitation      PULMONIC VALVE: Leaflets exhibited normal thickness, no calcification, and normal cuspal separation  DOPPLER: The transpulmonic velocity was within the normal range  There was no significant regurgitation  PERICARDIUM: There was no pericardial effusion  The pericardium was normal in appearance  AORTA: The root exhibited normal size  SYSTEMIC VEINS: IVC: The inferior vena cava was normal in size  PULMONARY VEINS: DOPPLER: Doppler signals were not recordable in the pulmonary vein(s)  SYSTEM MEASUREMENT TABLES    2D  %FS: 34 2 %  Ao Diam: 3 39 cm  Ao asc: 3 41 cm  EDV(Teich): 109 42 ml  EF(Cube): 71 51 %  EF(Teich): 63 07 %  ESV(Cube): 32 22 ml  ESV(Teich): 40 41 ml  IVSd: 1 1 cm  LA Area: 20 46 cm2  LA Diam: 4 49 cm  LAAd A2C: 23 41 cm2  LAAd A4C: 25 94 cm2  LAEDV A-L A2C: 76 95 ml  LAEDV A-L A4C: 94 56 ml  LAEDV Index (A-L): 38 97 ml/m2  LAEDV MOD A2C: 73 51 ml  LAEDV MOD A4C: 87 09 ml  LAEDV(A-L): 85 34 ml  LALd A2C: 6 05 cm  LALd A4C: 6 04 cm  LVIDd: 4 84 cm  LVIDs: 3 18 cm  LVPWd: 1 07 cm  RA Area: 15 79 cm2  SI(Cube): 36 94 ml/m2  SI(Teich): 31 51 ml/m2  SV(Cube): 80 89 ml  SV(Teich): 69 01 ml  rv diam: 3 15 cm    CW  AR Dec [place]: 2 16 m/s2  AR Dec Time: 2137 84 ms  AR PHT: 619 97 ms  AR Vmax: 4 62 m/s  AR maxP 42 mmHg  AV Vmax: 1 48 m/s  AV maxP 79 mmHg    MM  AV Cusp: 2 34 cm  Ao Diam: 3 62 cm  LA Diam: 4 4 cm  LA/Ao: 1 22  TAPSE: 2 2 cm    PW  E': 0 08 m/s  E/E': 13 37  LVOT Vmax: 0 85 m/s  LVOT maxP 9 mmHg  MV A Dur: 118 3 ms  MV A Danilo: 0 75 m/s  MV Dec [place]: 4 68 m/s2  MV DecT: 220 54 ms  MV E Danilo: 1 03 m/s  MV E/A Ratio: 1 38    [facility] Accredited Echocardiography [facility]    Prepared and electronically signed by    Nivia Crandall MD  Signed 2018 15:47:00       No results found for this or any previous visit    Results for orders placed during the hospital encounter of 18   Cardiac catheterization    Narrative [facility] 175  [address] [place] [address]  [phone]    [facility]    Invasive Cardiovascular Lab Complete Report    Patient: Ric Winter  MR number: [id number]  Account number: [de-identified]  Study date: 2018  Gender: Female  : 1955  Height: 65 in  Weight: 255 2 lb  BSA: 2 2 m squared    Allergies: NO KNOWN ALLERGIES    Diagnostic Cardiologist:  Arnaud Miguel MD  Primary Physician:  Patricia PETTIT    CORONARY CIRCULATION:  Distal LAD: There was a 100 % stenosis  There was THEO grade 1 flow through the vessel (slow flow without perfusion)  This lesion is a likely culprit for the patient's recent myocardial infarction  secondary to dissection    CARDIAC STRUCTURES:  Analysis of regional contractile function demonstrated apical akinesis  Global left ventricular function was hypercontractile  EF calculated by contrast ventriculography was 75 %  HEMODYNAMICS:  Hemodynamic assessment demonstrated mildly elevated LVEDP  INDICATIONS:  --  Possible CAD: myocardial infarction without ST elevation (NSTEMI)  PROCEDURES PERFORMED    --  Left heart catheterization with ventriculography  --  Left coronary angiography  --  Right coronary angiography  --  Inpatient  --  Mod Sedation Same Physician Initial 15min  --  Coronary Catheterization (w/ LHC)  PROCEDURE: The risks and alternatives of the procedures and conscious sedation were explained to the patient and informed consent was obtained  The patient was brought to the cath lab and placed on the table  The planned puncture sites  were prepped and draped in the usual sterile fashion  --  Right radial artery access  After performing an Sammy's test to verify adequate ulnar artery supply to the hand, the radial site was prepped  The puncture site was infiltrated with local anesthetic   The vessel was accessed using the  modified Seldinger technique, a wire was advanced into the vessel, and a sheath was advanced over the wire into the vessel  --  Left heart catheterization with ventriculography  A catheter was advanced over a guidewire into the ascending aorta  After recording ascending aortic pressure, the catheter was advanced across the aortic valve and left ventricular  pressure was recorded  Ventriculography was performed  The catheter was pulled back across the aortic valve and into the ascending aorta and pullback pressures were obtained  --  Left coronary artery angiography  A catheter was advanced over a guidewire into the aorta and positioned in the left coronary artery ostium under fluoroscopic guidance  Angiography was performed  --  Right coronary artery angiography  A catheter was advanced over a guidewire into the aorta and positioned in the right coronary artery ostium under fluoroscopic guidance  Angiography was performed  --  Inpatient  --  Mod Sedation Same Physician Initial 15min  --  Coronary Catheterization (w/ LHC)  PROCEDURE COMPLETION: The patient tolerated the procedure well and was discharged from the cath lab  TIMING: Test started at 17:13  Test concluded at 17:28  HEMOSTASIS: The sheath was removed  The site was compressed with a Hemoband  device  Hemostasis was obtained  MEDICATIONS GIVEN: Fentanyl (1OOmcg/2 ml), 50 mcg, IV, at 17:10  Versed (2mg/2ml), 2 mg, IV, at 17:10  1% Lidocaine, 2 ml, subcutaneously, at 17:14  Fentanyl (1OOmcg/2 ml), 50 mcg, IV, at 17:15  Versed  (2mg/2ml), 1 mg, IV, at 17:15  Nitroglycerin (200mcg/ml), 200 mcg, at 17:15  Verapamil (5mg/2ml), 2 5 mg, IV, at 17:15  Heparin 1000 units/ml, 4,000 units, IV, at 17:16  CONTRAST GIVEN: 75 ml Omnipaque (350mg I /ml)  RADIATION EXPOSURE:  Fluoroscopy time: 4 03 min  HEMODYNAMICS: Hemodynamic assessment demonstrated mildly elevated LVEDP  VENTRICLES:   --  Analysis of regional contractile function demonstrated apical akinesis  Global left ventricular function was hypercontractile   EF calculated by contrast ventriculography was 75 %  VALVES:  AORTIC VALVE:   --  There was no aortic stenosis  MITRAL VALVE:   --  The mitral valve exhibited no regurgitation  CORONARY VESSELS:   --  The coronary circulation is right dominant  --  Left main: The vessel was medium to large sized  Angiography showed no evidence of disease  --  LAD: The vessel was medium to large sized  --  Distal LAD: There was a 100 % stenosis  There was THEO grade 1 flow through the vessel (slow flow without perfusion)  This lesion is a likely culprit for the patient's recent myocardial infarction  secondary to dissection  --  Circumflex: The vessel was small sized  Angiography showed no evidence of disease  There was one major obtuse marginal   --  RCA: The vessel was medium to large sized  Angiography showed no evidence of disease  The RCA vascularized the posterior and lateral walls  IMPRESSIONS:  There is significant single vessel coronary artery disease  RECOMMENDATIONS  The patient should continue with the present medications except as noted above  beta blocker plus ACEI  No intervention due to distal vessel dissection/very small size  usually adequate healing develops with expectant management    Prepared and signed by    Inocencia Strauss MD  Signed 2018 17:37:46    Study diagram    Angiographic findings  Native coronary lesions:  ·Distal LAD: Lesion 1: 100 % stenosis  Hemodynamic tables    Pressures:  Baseline  Pressures:  - HR: 72  Pressures:  - Rhythm:  Pressures:  -- Aortic Pressure (S/D/M): 131/73/103  Pressures:  -- Left Ventricle (s/edp): 117/27/--    Outputs:  Baseline  Outputs:  -- CALCULATIONS: [age]  Outputs:  -- CALCULATIONS: Body Surface Area: 2 20  Outputs:  -- CALCULATIONS: Height in cm: 165 00  Outputs:  -- CALCULATIONS: Sex: Female  Outputs:  -- CALCULATIONS: Weight in k 00       No results found for this or any previous visit      Donovan Lujan MD    Portions of the record may have been created with voice recognition software   Occasional wrong word or "sound a like" substitutions may have occurred due to the inherent limitations of voice recognition software   Read the chart carefully and recognize, using context, where substitutions have occurred

## 2019-04-06 ENCOUNTER — OFFICE VISIT (OUTPATIENT)
Dept: URGENT CARE | Facility: CLINIC | Age: 64
End: 2019-04-06
Payer: COMMERCIAL

## 2019-04-06 ENCOUNTER — APPOINTMENT (OUTPATIENT)
Dept: RADIOLOGY | Facility: CLINIC | Age: 64
End: 2019-04-06
Payer: COMMERCIAL

## 2019-04-06 VITALS
OXYGEN SATURATION: 97 % | BODY MASS INDEX: 46.32 KG/M2 | RESPIRATION RATE: 16 BRPM | HEIGHT: 65 IN | WEIGHT: 278 LBS | SYSTOLIC BLOOD PRESSURE: 152 MMHG | TEMPERATURE: 99.8 F | HEART RATE: 88 BPM | DIASTOLIC BLOOD PRESSURE: 88 MMHG

## 2019-04-06 DIAGNOSIS — S82.832A OTHER CLOSED FRACTURE OF DISTAL END OF LEFT FIBULA, INITIAL ENCOUNTER: ICD-10-CM

## 2019-04-06 DIAGNOSIS — M25.572 ACUTE LEFT ANKLE PAIN: ICD-10-CM

## 2019-04-06 DIAGNOSIS — M79.662 PAIN IN LEFT LOWER LEG: ICD-10-CM

## 2019-04-06 DIAGNOSIS — M25.572 ACUTE LEFT ANKLE PAIN: Primary | ICD-10-CM

## 2019-04-06 PROCEDURE — 99213 OFFICE O/P EST LOW 20 MIN: CPT | Performed by: NURSE PRACTITIONER

## 2019-04-06 PROCEDURE — 73610 X-RAY EXAM OF ANKLE: CPT

## 2019-04-06 RX ORDER — UBIDECARENONE 75 MG
CAPSULE ORAL DAILY
COMMUNITY

## 2019-04-09 ENCOUNTER — APPOINTMENT (OUTPATIENT)
Dept: RADIOLOGY | Facility: CLINIC | Age: 64
End: 2019-04-09
Payer: COMMERCIAL

## 2019-04-09 ENCOUNTER — OFFICE VISIT (OUTPATIENT)
Dept: OBGYN CLINIC | Facility: CLINIC | Age: 64
End: 2019-04-09
Payer: COMMERCIAL

## 2019-04-09 VITALS
HEIGHT: 65 IN | BODY MASS INDEX: 46.32 KG/M2 | SYSTOLIC BLOOD PRESSURE: 128 MMHG | DIASTOLIC BLOOD PRESSURE: 72 MMHG | WEIGHT: 278 LBS

## 2019-04-09 DIAGNOSIS — S82.822A CLOSED TORUS FRACTURE OF DISTAL END OF LEFT FIBULA, INITIAL ENCOUNTER: Primary | ICD-10-CM

## 2019-04-09 DIAGNOSIS — M25.572 PAIN, JOINT, ANKLE AND FOOT, LEFT: ICD-10-CM

## 2019-04-09 PROBLEM — S82.832A CLOSED FRACTURE OF DISTAL END OF LEFT FIBULA: Status: ACTIVE | Noted: 2019-04-09

## 2019-04-09 PROCEDURE — 27786 TREATMENT OF ANKLE FRACTURE: CPT | Performed by: FAMILY MEDICINE

## 2019-04-09 PROCEDURE — 73590 X-RAY EXAM OF LOWER LEG: CPT

## 2019-04-09 PROCEDURE — 73610 X-RAY EXAM OF ANKLE: CPT

## 2019-04-09 PROCEDURE — 99204 OFFICE O/P NEW MOD 45 MIN: CPT | Performed by: FAMILY MEDICINE

## 2019-04-09 RX ORDER — NAPROXEN 500 MG/1
TABLET ORAL
Qty: 60 TABLET | Refills: 0 | Status: SHIPPED | OUTPATIENT
Start: 2019-04-09 | End: 2021-04-14

## 2019-04-12 ENCOUNTER — OFFICE VISIT (OUTPATIENT)
Dept: OBGYN CLINIC | Facility: CLINIC | Age: 64
End: 2019-04-12
Payer: COMMERCIAL

## 2019-04-12 VITALS
BODY MASS INDEX: 46.32 KG/M2 | DIASTOLIC BLOOD PRESSURE: 83 MMHG | HEIGHT: 65 IN | WEIGHT: 278 LBS | SYSTOLIC BLOOD PRESSURE: 125 MMHG

## 2019-04-12 DIAGNOSIS — M79.672 PAIN IN LEFT FOOT: Primary | ICD-10-CM

## 2019-04-12 PROCEDURE — 29405 APPL SHORT LEG CAST: CPT | Performed by: FAMILY MEDICINE

## 2019-04-12 PROCEDURE — 99024 POSTOP FOLLOW-UP VISIT: CPT | Performed by: FAMILY MEDICINE

## 2019-04-26 ENCOUNTER — TELEPHONE (OUTPATIENT)
Dept: OBGYN CLINIC | Facility: HOSPITAL | Age: 64
End: 2019-04-26

## 2019-04-29 ENCOUNTER — OFFICE VISIT (OUTPATIENT)
Dept: OBGYN CLINIC | Facility: CLINIC | Age: 64
End: 2019-04-29
Payer: COMMERCIAL

## 2019-04-29 VITALS
HEART RATE: 102 BPM | HEIGHT: 65 IN | DIASTOLIC BLOOD PRESSURE: 87 MMHG | WEIGHT: 278 LBS | SYSTOLIC BLOOD PRESSURE: 134 MMHG | BODY MASS INDEX: 46.32 KG/M2

## 2019-04-29 DIAGNOSIS — S82.822A CLOSED TORUS FRACTURE OF DISTAL END OF LEFT FIBULA, INITIAL ENCOUNTER: Primary | ICD-10-CM

## 2019-04-29 PROCEDURE — 29075 APPL CST ELBW FNGR SHORT ARM: CPT | Performed by: ORTHOPAEDIC SURGERY

## 2019-04-29 PROCEDURE — 99213 OFFICE O/P EST LOW 20 MIN: CPT | Performed by: ORTHOPAEDIC SURGERY

## 2019-05-10 ENCOUNTER — APPOINTMENT (OUTPATIENT)
Dept: RADIOLOGY | Facility: CLINIC | Age: 64
End: 2019-05-10
Payer: COMMERCIAL

## 2019-05-10 ENCOUNTER — OFFICE VISIT (OUTPATIENT)
Dept: OBGYN CLINIC | Facility: CLINIC | Age: 64
End: 2019-05-10

## 2019-05-10 VITALS
BODY MASS INDEX: 46.32 KG/M2 | HEIGHT: 65 IN | DIASTOLIC BLOOD PRESSURE: 70 MMHG | WEIGHT: 278 LBS | SYSTOLIC BLOOD PRESSURE: 130 MMHG

## 2019-05-10 DIAGNOSIS — S82.822D CLOSED TORUS FRACTURE OF DISTAL END OF LEFT FIBULA WITH ROUTINE HEALING, SUBSEQUENT ENCOUNTER: ICD-10-CM

## 2019-05-10 DIAGNOSIS — M25.572 PAIN, JOINT, ANKLE AND FOOT, LEFT: ICD-10-CM

## 2019-05-10 DIAGNOSIS — M25.572 PAIN, JOINT, ANKLE AND FOOT, LEFT: Primary | ICD-10-CM

## 2019-05-10 PROCEDURE — 73610 X-RAY EXAM OF ANKLE: CPT

## 2019-05-10 PROCEDURE — 99024 POSTOP FOLLOW-UP VISIT: CPT | Performed by: FAMILY MEDICINE

## 2019-05-24 ENCOUNTER — APPOINTMENT (OUTPATIENT)
Dept: RADIOLOGY | Facility: CLINIC | Age: 64
End: 2019-05-24
Payer: COMMERCIAL

## 2019-05-24 ENCOUNTER — OFFICE VISIT (OUTPATIENT)
Dept: OBGYN CLINIC | Facility: CLINIC | Age: 64
End: 2019-05-24

## 2019-05-24 VITALS
DIASTOLIC BLOOD PRESSURE: 83 MMHG | HEIGHT: 65 IN | SYSTOLIC BLOOD PRESSURE: 130 MMHG | BODY MASS INDEX: 46.32 KG/M2 | WEIGHT: 278 LBS

## 2019-05-24 DIAGNOSIS — M25.572 PAIN, JOINT, ANKLE AND FOOT, LEFT: ICD-10-CM

## 2019-05-24 DIAGNOSIS — M25.572 PAIN, JOINT, ANKLE AND FOOT, LEFT: Primary | ICD-10-CM

## 2019-05-24 DIAGNOSIS — S82.822D CLOSED TORUS FRACTURE OF DISTAL END OF LEFT FIBULA WITH ROUTINE HEALING, SUBSEQUENT ENCOUNTER: Primary | ICD-10-CM

## 2019-05-24 PROCEDURE — 99024 POSTOP FOLLOW-UP VISIT: CPT | Performed by: FAMILY MEDICINE

## 2019-05-24 PROCEDURE — 73610 X-RAY EXAM OF ANKLE: CPT

## 2019-06-03 ENCOUNTER — TRANSCRIBE ORDERS (OUTPATIENT)
Dept: ADMINISTRATIVE | Facility: HOSPITAL | Age: 64
End: 2019-06-03

## 2019-06-03 ENCOUNTER — APPOINTMENT (OUTPATIENT)
Dept: LAB | Facility: CLINIC | Age: 64
End: 2019-06-03
Payer: COMMERCIAL

## 2019-06-03 DIAGNOSIS — R05.9 COUGH: ICD-10-CM

## 2019-06-03 DIAGNOSIS — I21.4 ACUTE MYOCARDIAL INFARCTION, SUBENDOCARDIAL INFARCTION, INITIAL EPISODE OF CARE (HCC): ICD-10-CM

## 2019-06-03 DIAGNOSIS — E66.01 MORBID OBESITY (HCC): ICD-10-CM

## 2019-06-03 DIAGNOSIS — G43.109 MIGRAINE WITH AURA AND WITHOUT STATUS MIGRAINOSUS, NOT INTRACTABLE: ICD-10-CM

## 2019-06-03 DIAGNOSIS — F41.9 ANXIETY: ICD-10-CM

## 2019-06-03 DIAGNOSIS — I10 HYPERTENSION, ESSENTIAL: Primary | ICD-10-CM

## 2019-06-03 DIAGNOSIS — I10 HYPERTENSION, ESSENTIAL: ICD-10-CM

## 2019-06-03 LAB
ALBUMIN SERPL BCP-MCNC: 3.6 G/DL (ref 3.5–5)
ALP SERPL-CCNC: 140 U/L (ref 46–116)
ALT SERPL W P-5'-P-CCNC: 19 U/L (ref 12–78)
ANION GAP SERPL CALCULATED.3IONS-SCNC: 6 MMOL/L (ref 4–13)
AST SERPL W P-5'-P-CCNC: 11 U/L (ref 5–45)
BASOPHILS # BLD AUTO: 0.06 THOUSANDS/ΜL (ref 0–0.1)
BASOPHILS NFR BLD AUTO: 1 % (ref 0–1)
BILIRUB SERPL-MCNC: 0.52 MG/DL (ref 0.2–1)
BUN SERPL-MCNC: 16 MG/DL (ref 5–25)
CALCIUM SERPL-MCNC: 8.8 MG/DL (ref 8.3–10.1)
CHLORIDE SERPL-SCNC: 104 MMOL/L (ref 100–108)
CHOLEST SERPL-MCNC: 134 MG/DL (ref 50–200)
CO2 SERPL-SCNC: 28 MMOL/L (ref 21–32)
CREAT SERPL-MCNC: 0.73 MG/DL (ref 0.6–1.3)
EOSINOPHIL # BLD AUTO: 0.14 THOUSAND/ΜL (ref 0–0.61)
EOSINOPHIL NFR BLD AUTO: 2 % (ref 0–6)
ERYTHROCYTE [DISTWIDTH] IN BLOOD BY AUTOMATED COUNT: 12.4 % (ref 11.6–15.1)
EST. AVERAGE GLUCOSE BLD GHB EST-MCNC: 120 MG/DL
GFR SERPL CREATININE-BSD FRML MDRD: 89 ML/MIN/1.73SQ M
GLUCOSE P FAST SERPL-MCNC: 117 MG/DL (ref 65–99)
HBA1C MFR BLD: 5.8 % (ref 4.2–6.3)
HCT VFR BLD AUTO: 42.6 % (ref 34.8–46.1)
HDLC SERPL-MCNC: 43 MG/DL (ref 40–60)
HGB BLD-MCNC: 14.2 G/DL (ref 11.5–15.4)
IMM GRANULOCYTES # BLD AUTO: 0.03 THOUSAND/UL (ref 0–0.2)
IMM GRANULOCYTES NFR BLD AUTO: 0 % (ref 0–2)
LDLC SERPL CALC-MCNC: 59 MG/DL (ref 0–100)
LYMPHOCYTES # BLD AUTO: 0.99 THOUSANDS/ΜL (ref 0.6–4.47)
LYMPHOCYTES NFR BLD AUTO: 11 % (ref 14–44)
MCH RBC QN AUTO: 29.8 PG (ref 26.8–34.3)
MCHC RBC AUTO-ENTMCNC: 33.3 G/DL (ref 31.4–37.4)
MCV RBC AUTO: 90 FL (ref 82–98)
MONOCYTES # BLD AUTO: 0.63 THOUSAND/ΜL (ref 0.17–1.22)
MONOCYTES NFR BLD AUTO: 7 % (ref 4–12)
NEUTROPHILS # BLD AUTO: 7 THOUSANDS/ΜL (ref 1.85–7.62)
NEUTS SEG NFR BLD AUTO: 79 % (ref 43–75)
NONHDLC SERPL-MCNC: 91 MG/DL
NRBC BLD AUTO-RTO: 0 /100 WBCS
PLATELET # BLD AUTO: 199 THOUSANDS/UL (ref 149–390)
PMV BLD AUTO: 11.2 FL (ref 8.9–12.7)
POTASSIUM SERPL-SCNC: 3.9 MMOL/L (ref 3.5–5.3)
PROT SERPL-MCNC: 7.3 G/DL (ref 6.4–8.2)
RBC # BLD AUTO: 4.76 MILLION/UL (ref 3.81–5.12)
SODIUM SERPL-SCNC: 138 MMOL/L (ref 136–145)
TRIGL SERPL-MCNC: 161 MG/DL
TSH SERPL DL<=0.05 MIU/L-ACNC: 2.55 UIU/ML (ref 0.36–3.74)
WBC # BLD AUTO: 8.85 THOUSAND/UL (ref 4.31–10.16)

## 2019-06-03 PROCEDURE — 36415 COLL VENOUS BLD VENIPUNCTURE: CPT

## 2019-06-03 PROCEDURE — 80053 COMPREHEN METABOLIC PANEL: CPT

## 2019-06-03 PROCEDURE — 80061 LIPID PANEL: CPT

## 2019-06-03 PROCEDURE — 83036 HEMOGLOBIN GLYCOSYLATED A1C: CPT

## 2019-06-03 PROCEDURE — 85025 COMPLETE CBC W/AUTO DIFF WBC: CPT

## 2019-06-03 PROCEDURE — 84443 ASSAY THYROID STIM HORMONE: CPT

## 2019-06-26 ENCOUNTER — OFFICE VISIT (OUTPATIENT)
Dept: OBGYN CLINIC | Facility: CLINIC | Age: 64
End: 2019-06-26

## 2019-06-26 VITALS
SYSTOLIC BLOOD PRESSURE: 120 MMHG | HEIGHT: 65 IN | DIASTOLIC BLOOD PRESSURE: 83 MMHG | WEIGHT: 260 LBS | BODY MASS INDEX: 43.32 KG/M2

## 2019-06-26 DIAGNOSIS — S82.822D CLOSED TORUS FRACTURE OF DISTAL END OF LEFT FIBULA WITH ROUTINE HEALING, SUBSEQUENT ENCOUNTER: Primary | ICD-10-CM

## 2019-06-26 PROCEDURE — 99024 POSTOP FOLLOW-UP VISIT: CPT | Performed by: FAMILY MEDICINE

## 2019-10-02 ENCOUNTER — OFFICE VISIT (OUTPATIENT)
Dept: CARDIOLOGY CLINIC | Facility: CLINIC | Age: 64
End: 2019-10-02
Payer: COMMERCIAL

## 2019-10-02 VITALS
SYSTOLIC BLOOD PRESSURE: 118 MMHG | HEIGHT: 65 IN | DIASTOLIC BLOOD PRESSURE: 72 MMHG | BODY MASS INDEX: 44.48 KG/M2 | WEIGHT: 267 LBS | HEART RATE: 82 BPM

## 2019-10-02 DIAGNOSIS — E78.00 HYPERCHOLESTEREMIA: ICD-10-CM

## 2019-10-02 DIAGNOSIS — I10 ESSENTIAL HYPERTENSION: ICD-10-CM

## 2019-10-02 DIAGNOSIS — I25.2 OLD MYOCARDIAL INFARCTION: ICD-10-CM

## 2019-10-02 DIAGNOSIS — I25.10 ATHEROSCLEROSIS OF NATIVE CORONARY ARTERY OF NATIVE HEART WITHOUT ANGINA PECTORIS: Primary | ICD-10-CM

## 2019-10-02 PROCEDURE — 99214 OFFICE O/P EST MOD 30 MIN: CPT | Performed by: INTERNAL MEDICINE

## 2019-10-02 PROCEDURE — 3078F DIAST BP <80 MM HG: CPT | Performed by: INTERNAL MEDICINE

## 2019-10-02 PROCEDURE — 3074F SYST BP LT 130 MM HG: CPT | Performed by: INTERNAL MEDICINE

## 2019-10-02 PROCEDURE — 93000 ELECTROCARDIOGRAM COMPLETE: CPT | Performed by: INTERNAL MEDICINE

## 2019-10-02 RX ORDER — LISINOPRIL AND HYDROCHLOROTHIAZIDE 12.5; 1 MG/1; MG/1
1 TABLET ORAL DAILY
Refills: 0 | COMMUNITY
Start: 2019-09-26

## 2019-10-02 RX ORDER — RIZATRIPTAN BENZOATE 5 MG/1
TABLET, ORALLY DISINTEGRATING ORAL
COMMUNITY
Start: 2019-09-27 | End: 2021-04-14

## 2019-10-02 RX ORDER — METOCLOPRAMIDE 5 MG/1
TABLET ORAL
Refills: 0 | COMMUNITY
Start: 2019-09-26

## 2019-10-02 NOTE — PROGRESS NOTES
Cardiology Follow Up    Fredo Perez  1955  [id number]  [address] ,  [address] [id number] [phone]    1  Atherosclerosis of native coronary artery of native heart without angina pectoris  POCT ECG   2  Essential hypertension  POCT ECG   3  Old myocardial infarction     4  Hypercholesteremia         Interval History:     Rivera Umaña comes in for follow-up given her coronary artery disease history  Back in April 2018 she had an NSTEMI in which she presented with chest pain and anterior T-wave inversions on her ECG  She was found to have a distal LAD occlusion with a focal dissection  This was not amendable to intervention, as this was a small distal vessel and she was treated medically  Her LV function was normal on echocardiogram with an apical regional wall motion abnormality  She has done well since  For a while after her myocardial infarction she had issues with shortness of breath and fatigue, but this has improved  Last year a stress nuclear study was ordered, which she did not have done as she was having issues with her knee and then ended up breaking her ankle  Overall Rivera Umaña feels well  She still continues to have shortness of breath with exertion, but this in her fatigue have improved over the last year  She has been trying to become more active  She has certainly become deconditioned as she has not exercise much since her myocardial infarction and her orthopedic problems  She denies any return of chest pain or any other symptoms of angina  No palpitations, lightheadedness or any history of syncope  No signs or symptoms of CHF        Patient Active Problem List   Diagnosis    Essential hypertension    Old myocardial infarction    Morbid obesity due to excess calories ([address] )    Anxiety    Atherosclerosis of native coronary artery of native heart without angina pectoris    Hypercholesteremia    Pain, joint, ankle and foot, left    Closed fracture of distal end of left fibula     Past Medical History:   Diagnosis Date    Anxiety     GERD (gastroesophageal reflux disease)     Hypertension     Migraine      Social History     Socioeconomic History    Marital status: /Civil Union     Spouse name: Not on file    Number of children: Not on file    Years of education: Not on file    Highest education level: Not on file   Occupational History    Not on file   Social Needs    Financial resource strain: Not on file    Food insecurity:     Worry: Not on file     Inability: Not on file    Transportation needs:     Medical: Not on file     Non-medical: Not on file   Tobacco Use    Smoking status: Never Smoker    Smokeless tobacco: Never Used   Substance and Sexual Activity    Alcohol use: Yes     Comment: occassionally    Drug use: No    Sexual activity: Not on file   Lifestyle    Physical activity:     Days per week: Not on file     Minutes per session: Not on file    Stress: Not on file   Relationships    Social connections:     Talks on phone: Not on file     Gets together: Not on file     Attends Shinto service: Not on file     Active member of club or organization: Not on file     Attends meetings of clubs or organizations: Not on file     Relationship status: Not on file    Intimate partner violence:     Fear of current or ex partner: Not on file     Emotionally abused: Not on file     Physically abused: Not on file     Forced sexual activity: Not on file   Other Topics Concern    Not on file   Social History Narrative    Not on file      No family history on file    Past Surgical History:   Procedure Laterality Date    BACK SURGERY      HYSTERECTOMY      LUMBAR DISCECTOMY         Current Outpatient Medications:     amLODIPine (NORVASC) 5 mg tablet, Take 1 tablet (5 mg total) by mouth daily, Disp: 30 tablet, Rfl: 1    aspirin 81 mg chewable tablet, Chew 1 tablet (81 mg total) daily (Patient taking differently: Chew 325 mg daily ), Disp: 30 tablet, Rfl: 1    atorvastatin (LIPITOR) 40 mg tablet, Take 1 tablet (40 mg total) by mouth every evening, Disp: 30 tablet, Rfl: 1    cetirizine (ZYRTEC ALLERGY) 10 mg tablet, Take 1 tablet by mouth daily as needed, Disp: , Rfl:     co-enzyme Q-10 30 MG capsule, Take 100 mg by mouth 3 (three) times a day , Disp: , Rfl:     cyanocobalamin (VITAMIN B-12) 100 mcg tablet, Take by mouth daily, Disp: , Rfl:     diclofenac sodium (VOLTAREN) 1 %, Apply 2 g topically 4 (four) times a day, Disp: 1 Tube, Rfl: 0    DULoxetine (CYMBALTA) 20 mg capsule, 60 mg daily , Disp: , Rfl:     fluticasone (FLONASE) 50 mcg/act nasal spray, 1 spray into each nostril daily, Disp: , Rfl:     lisinopril-hydrochlorothiazide (PRINZIDE,ZESTORETIC) 10-12 5 MG per tablet, Take 1 tablet by mouth daily, Disp: , Rfl: 0    metoclopramide (REGLAN) 5 mg tablet, TAKE 1 TABLET AT ONSET OF HEADACHE TWICE A DAY ORALLY FOR 14 DAYS, Disp: , Rfl: 0    metoprolol succinate (TOPROL-XL) 100 mg 24 hr tablet, Take 100 mg by mouth daily  , Disp: , Rfl:     omeprazole (PRILOSEC OTC) 20 MG tablet, Take 1 tablet by mouth daily, Disp: , Rfl:     rizatriptan (MAXALT-MLT) 5 MG disintegrating tablet, , Disp: , Rfl:     albuterol (PROVENTIL HFA,VENTOLIN HFA) 90 mcg/act inhaler, Inhale 2 puffs as needed, Disp: , Rfl:     naproxen (EC NAPROSYN) 500 MG EC tablet, One tablet p o  b i d  P r n  for pain (Patient not taking: Reported on 10/2/2019), Disp: 60 tablet, Rfl: 0    nitroglycerin (NITROSTAT) 0 4 mg SL tablet, Place 1 tablet (0 4 mg total) under the tongue every 5 (five) minutes as needed for chest pain (Patient not taking: Reported on 10/2/2019), Disp: 90 tablet, Rfl: 0    traMADol (ULTRAM) 50 mg tablet, Take 50 mg by mouth every 6 (six) hours as needed for moderate pain (for migrain pain), Disp: , Rfl:   No Known Allergies    Labs:  Lab Results   Component Value Date     01/23/2015    K 3 9 06/03/2019    K 3 6 01/23/2015     06/03/2019    CL 99 (L) 01/23/2015    CO2 28 06/03/2019    CO2 33 01/23/2015    BUN 16 06/03/2019    BUN 20 01/23/2015    CREATININE 0 73 06/03/2019    CREATININE 0 81 01/23/2015    GLUCOSE 104 01/23/2015    CALCIUM 8 8 06/03/2019    CALCIUM 9 3 01/23/2015     Lab Results   Component Value Date    CHOL 182 01/23/2015    TRIG 161 (H) 06/03/2019    TRIG 133 01/23/2015    HDL 43 06/03/2019    HDL 40 01/23/2015   LDL 59      Imaging:  Sinus rhythm with poor R-wave progression    ECHO(4/2018):  LEFT VENTRICLE:  Systolic function was normal  Ejection fraction was estimated to be 60 %  There was severe hypokinesis of the apical wall(s)  Wall thickness was at the upper limits of normal      MITRAL VALVE:  There was mild regurgitation      AORTIC VALVE:  There was mild regurgitation      TRICUSPID VALVE:  There was trace regurgitation  CARDIAC CATH(4/2018):  CORONARY CIRCULATION:  Distal LAD: There was a 100 % stenosis  There was THEO grade 1 flow through the vessel (slow flow without perfusion)  This lesion is a likely culprit for the patient's recent myocardial infarction  secondary to dissection     CARDIAC STRUCTURES:  Analysis of regional contractile function demonstrated apical akinesis  Global left ventricular function was hypercontractile  EF calculated by contrast ventriculography was 75 %  Review of Systems:  Review of Systems   Constitutional: Negative  HENT: Negative  Eyes: Negative  Respiratory: Negative  Cardiovascular: Negative  Gastrointestinal: Negative  Musculoskeletal: Negative  Skin: Negative  Allergic/Immunologic: Negative  Neurological: Negative  Hematological: Negative  Psychiatric/Behavioral: Negative  All other systems reviewed and are negative      Vitals:    10/02/19 1117   BP: 118/72   Pulse: 82     Physical Exam:  Physical Exam   Constitutional: She is oriented to person, place, and time  She appears well-developed and well-nourished  HENT:   Head: Normocephalic and atraumatic  Eyes: Pupils are equal, round, and reactive to light  EOM are normal  Right eye exhibits no discharge  Left eye exhibits no discharge  No scleral icterus  Neck: Normal range of motion  Neck supple  No JVD present  No thyromegaly present  Cardiovascular: Normal rate, regular rhythm, S1 normal, S2 normal, normal heart sounds, intact distal pulses and normal pulses  No extrasystoles are present  Exam reveals no gallop, no friction rub and no decreased pulses  No murmur heard  Pulmonary/Chest: Effort normal and breath sounds normal  No respiratory distress  She has no wheezes  She has no rales  Abdominal: Soft  Bowel sounds are normal  She exhibits no distension  There is no tenderness  Musculoskeletal: Normal range of motion  She exhibits no edema, tenderness or deformity  Neurological: She is alert and oriented to person, place, and time  No cranial nerve deficit  Skin: Skin is warm and dry  No rash noted  Psychiatric: She has a normal mood and affect  Judgment and thought content normal    Nursing note and vitals reviewed  Discussion/Summary:    1  CAD - Ian Winters had a NSTEMI in April of 2018 that showed a distal LAD occlusion with focal dissection  This was not amenable to intervention, and she has done well with medical management  She is without symptoms of angina  I have encouraged her to remain active  She does get some shortness of breath with upper levels of exertion, mostly associated with deconditioning  I have encouraged her to get on a regular cardiovascular exercise regimen  I have also asked her to call if any symptoms of angina return or her shortness of breath worsens  No testing is needed at this time  No changes were made to her medical therapy  2  Hypertension - Well controlled    She should periodically have her blood pressure checked at home     3  Hypercholesterolemia - Her last LDL was at goal at 59  She will continue to have blood work followed closely  She will remain on the same dose of atorvastatin  We will see her back in 1 year  Counseling / Coordination of Care  Total office time spent today 30 minutes  Greater than 50% of total time was spent with the patient and / or family counseling and / or coordination of care

## 2020-01-13 ENCOUNTER — HOSPITAL ENCOUNTER (EMERGENCY)
Facility: HOSPITAL | Age: 65
Discharge: HOME | End: 2020-01-13
Attending: EMERGENCY MEDICINE | Admitting: EMERGENCY MEDICINE
Payer: COMMERCIAL

## 2020-01-13 ENCOUNTER — APPOINTMENT (EMERGENCY)
Dept: RADIOLOGY | Facility: HOSPITAL | Age: 65
End: 2020-01-13
Payer: COMMERCIAL

## 2020-01-13 ENCOUNTER — TELEPHONE (OUTPATIENT)
Dept: CARDIOLOGY CLINIC | Facility: CLINIC | Age: 65
End: 2020-01-13

## 2020-01-13 VITALS
DIASTOLIC BLOOD PRESSURE: 60 MMHG | WEIGHT: 271.06 LBS | HEIGHT: 65 IN | OXYGEN SATURATION: 94 % | SYSTOLIC BLOOD PRESSURE: 132 MMHG | HEART RATE: 78 BPM | TEMPERATURE: 98.5 F | BODY MASS INDEX: 45.16 KG/M2 | RESPIRATION RATE: 22 BRPM

## 2020-01-13 DIAGNOSIS — R53.83 FATIGUE: ICD-10-CM

## 2020-01-13 DIAGNOSIS — R00.2 PALPITATIONS: Primary | ICD-10-CM

## 2020-01-13 DIAGNOSIS — F41.9 ANXIETY: ICD-10-CM

## 2020-01-13 LAB
ALBUMIN SERPL BCP-MCNC: 3.6 G/DL (ref 3.5–5)
ALP SERPL-CCNC: 116 U/L (ref 46–116)
ALT SERPL W P-5'-P-CCNC: 18 U/L (ref 12–78)
ANION GAP SERPL CALCULATED.3IONS-SCNC: 6 MMOL/L (ref 4–13)
AST SERPL W P-5'-P-CCNC: 13 U/L (ref 5–45)
BASOPHILS # BLD AUTO: 0.04 THOUSANDS/ΜL (ref 0–0.1)
BASOPHILS NFR BLD AUTO: 1 % (ref 0–1)
BILIRUB SERPL-MCNC: 0.5 MG/DL (ref 0.2–1)
BUN SERPL-MCNC: 17 MG/DL (ref 5–25)
CALCIUM SERPL-MCNC: 9.4 MG/DL (ref 8.3–10.1)
CHLORIDE SERPL-SCNC: 104 MMOL/L (ref 100–108)
CO2 SERPL-SCNC: 33 MMOL/L (ref 21–32)
CREAT SERPL-MCNC: 0.71 MG/DL (ref 0.6–1.3)
EOSINOPHIL # BLD AUTO: 0.11 THOUSAND/ΜL (ref 0–0.61)
EOSINOPHIL NFR BLD AUTO: 2 % (ref 0–6)
ERYTHROCYTE [DISTWIDTH] IN BLOOD BY AUTOMATED COUNT: 12.6 % (ref 11.6–15.1)
GFR SERPL CREATININE-BSD FRML MDRD: 91 ML/MIN/1.73SQ M
GLUCOSE SERPL-MCNC: 95 MG/DL (ref 65–140)
HCT VFR BLD AUTO: 41.7 % (ref 34.8–46.1)
HGB BLD-MCNC: 14 G/DL (ref 11.5–15.4)
IMM GRANULOCYTES # BLD AUTO: 0.05 THOUSAND/UL (ref 0–0.2)
IMM GRANULOCYTES NFR BLD AUTO: 1 % (ref 0–2)
LYMPHOCYTES # BLD AUTO: 0.93 THOUSANDS/ΜL (ref 0.6–4.47)
LYMPHOCYTES NFR BLD AUTO: 12 % (ref 14–44)
MCH RBC QN AUTO: 30 PG (ref 26.8–34.3)
MCHC RBC AUTO-ENTMCNC: 33.6 G/DL (ref 31.4–37.4)
MCV RBC AUTO: 89 FL (ref 82–98)
MONOCYTES # BLD AUTO: 0.65 THOUSAND/ΜL (ref 0.17–1.22)
MONOCYTES NFR BLD AUTO: 9 % (ref 4–12)
NEUTROPHILS # BLD AUTO: 5.8 THOUSANDS/ΜL (ref 1.85–7.62)
NEUTS SEG NFR BLD AUTO: 75 % (ref 43–75)
NRBC BLD AUTO-RTO: 0 /100 WBCS
PLATELET # BLD AUTO: 193 THOUSANDS/UL (ref 149–390)
PMV BLD AUTO: 10.1 FL (ref 8.9–12.7)
POTASSIUM SERPL-SCNC: 4.2 MMOL/L (ref 3.5–5.3)
PROT SERPL-MCNC: 7.4 G/DL (ref 6.4–8.2)
RBC # BLD AUTO: 4.67 MILLION/UL (ref 3.81–5.12)
SODIUM SERPL-SCNC: 143 MMOL/L (ref 136–145)
TROPONIN I SERPL-MCNC: <0.02 NG/ML
WBC # BLD AUTO: 7.58 THOUSAND/UL (ref 4.31–10.16)

## 2020-01-13 PROCEDURE — 84484 ASSAY OF TROPONIN QUANT: CPT | Performed by: EMERGENCY MEDICINE

## 2020-01-13 PROCEDURE — 71045 X-RAY EXAM CHEST 1 VIEW: CPT

## 2020-01-13 PROCEDURE — 36415 COLL VENOUS BLD VENIPUNCTURE: CPT | Performed by: EMERGENCY MEDICINE

## 2020-01-13 PROCEDURE — 93005 ELECTROCARDIOGRAM TRACING: CPT

## 2020-01-13 PROCEDURE — 99285 EMERGENCY DEPT VISIT HI MDM: CPT

## 2020-01-13 PROCEDURE — 85025 COMPLETE CBC W/AUTO DIFF WBC: CPT | Performed by: EMERGENCY MEDICINE

## 2020-01-13 PROCEDURE — 80053 COMPREHEN METABOLIC PANEL: CPT | Performed by: EMERGENCY MEDICINE

## 2020-01-13 PROCEDURE — 99285 EMERGENCY DEPT VISIT HI MDM: CPT | Performed by: EMERGENCY MEDICINE

## 2020-01-13 RX ORDER — ALPRAZOLAM 0.25 MG/1
0.25 TABLET ORAL 3 TIMES DAILY PRN
Qty: 6 TABLET | Refills: 0 | Status: SHIPPED | OUTPATIENT
Start: 2020-01-13 | End: 2021-04-14

## 2020-01-13 NOTE — TELEPHONE ENCOUNTER
Pt called stating she had an acute PCP visit made and attended this morning for "heart beating out of her chest, fatigue and not feeling well "   Pt stated her PCP did an EKG and instructed her to go to the ED due to results of EKG and symptoms  Pt called office to see if she could come in and have doctor look at EKG and she if she should go to ED  Pt was instructed to go to ED if she is having the symptoms and PCP is instructing her to go to ED as per results of EKG  Doctor not available for next hour and pt's booked until hospital this afternoon  Not equipped in office if emergency  Pt verbalized understanding that it is agreed she should go to ED as instructed by PCP

## 2020-01-13 NOTE — ED PROVIDER NOTES
History  Chief Complaint   Patient presents with    Abnormal ECG     Patient reports that she was seen by PCP for fatigue, weakness, and palpitation for about a week  PCP concerned about EKG performed at office  Denies CP, SOB, n/v/d, fevers, admits to hot flashes but that is not new  Sent in from [facility] for evaluation of possible abnormal ekg  Pts brother in law  2 wks ago 2/2 MI and pt w/ hx of MI almost 2y ago  Pt has noted episodic palpitations and persistent fatigue for the past 1-2 wks  pcm felt it was anxiety related d/t recent death, but did an EKG in the office  [place] it was abnormal and had no old ekgs to compare it to so told to come to the ED for evaluation  Pt notes episodes of pounding/racing heart both at rest or with exertion  Denies any assoc symptoms of cp/pressure, no sob/caldera, no anorexia, no n/v, no le pain or swelling  Pt has noted feeling very fatigued and tired for the same amt of time, but denies any changes in her activity tolerance  Sleeping okay  History provided by:  Patient   used: No    Palpitations   Palpitations quality:  Fast  Onset quality:  Sudden  Timing:  Constant  Progression:  Resolved  Chronicity:  New  Context: anxiety    Context: not dehydration, not illicit drugs and not stimulant use    Relieved by:  None tried  Worsened by:  Nothing  Ineffective treatments:  None tried  Associated symptoms: malaise/fatigue    Associated symptoms: no back pain, no chest pain, no chest pressure, no diaphoresis, no leg pain, no lower extremity edema, no nausea, no numbness, no shortness of breath, no vomiting and no weakness    Risk factors: heart disease    Risk factors: no hx of atrial fibrillation and no hx of thyroid disease        Prior to Admission Medications   Prescriptions Last Dose Informant Patient Reported? Taking?    DULoxetine (CYMBALTA) 20 mg capsule  Self Yes No   Si mg daily    albuterol (PROVENTIL HFA,VENTOLIN HFA) 90 mcg/act inhaler  Self Yes No   Sig: Inhale 2 puffs as needed   amLODIPine (NORVASC) 5 mg tablet  Self No No   Sig: Take 1 tablet (5 mg total) by mouth daily   aspirin 81 mg chewable tablet  Self No No   Sig: Chew 1 tablet (81 mg total) daily   Patient taking differently: Chew 325 mg daily    atorvastatin (LIPITOR) 40 mg tablet  Self No No   Sig: Take 1 tablet (40 mg total) by mouth every evening   b complex vitamins tablet   Yes Yes   Sig: Take 1 tablet by mouth daily   cetirizine (ZYRTEC ALLERGY) 10 mg tablet  Self Yes No   Sig: Take 1 tablet by mouth daily as needed   co-enzyme Q-10 30 MG capsule  Self Yes No   Sig: Take 100 mg by mouth 3 (three) times a day    cyanocobalamin (VITAMIN B-12) 100 mcg tablet Not Taking at Unknown time Self Yes No   Sig: Take by mouth daily   diclofenac sodium (VOLTAREN) 1 % Not Taking at Unknown time  No No   Sig: Apply 2 g topically 4 (four) times a day   Patient not taking: Reported on 2020   fluticasone (FLONASE) 50 mcg/act nasal spray  Self Yes No   Si spray into each nostril daily   lisinopril-hydrochlorothiazide (PRINZIDE,ZESTORETIC) 10-12 5 MG per tablet   Yes No   Sig: Take 1 tablet by mouth daily   metoclopramide (REGLAN) 5 mg tablet Not Taking at Unknown time  Yes No   Sig: TAKE 1 TABLET AT ONSET OF HEADACHE TWICE A DAY ORALLY FOR 14 DAYS   metoprolol succinate (TOPROL-XL) 100 mg 24 hr tablet  Self Yes No   Sig: Take 100 mg by mouth daily     naproxen (EC NAPROSYN) 500 MG EC tablet  Self No No   Sig: One tablet p o  b i d  P r n  for pain   Patient not taking: Reported on 10/2/2019   nitroglycerin (NITROSTAT) 0 4 mg SL tablet  Self No No   Sig: Place 1 tablet (0 4 mg total) under the tongue every 5 (five) minutes as needed for chest pain   Patient not taking: Reported on 10/2/2019   omeprazole (PRILOSEC OTC) 20 MG tablet  Self Yes No   Sig: Take 1 tablet by mouth daily   rizatriptan (MAXALT-MLT) 5 MG disintegrating tablet   Yes No   traMADol (ULTRAM) 50 mg tablet Not Taking at Unknown time Self Yes No   Sig: Take 50 mg by mouth every 6 (six) hours as needed for moderate pain (for migrain pain)      Facility-Administered Medications: None       Past Medical History:   Diagnosis Date    Anxiety     GERD (gastroesophageal reflux disease)     Hypertension     MI, old     Migraine        Past Surgical History:   Procedure Laterality Date    BACK SURGERY      HYSTERECTOMY      LUMBAR DISCECTOMY         History reviewed  No pertinent family history  I have reviewed and agree with the history as documented  Social History     Tobacco Use    Smoking status: Never Smoker    Smokeless tobacco: Never Used   Substance Use Topics    Alcohol use: Yes     Comment: occassionally    Drug use: No        Review of Systems   Constitutional: Positive for malaise/fatigue  Negative for diaphoresis  Respiratory: Negative for shortness of breath  Cardiovascular: Negative for chest pain  Gastrointestinal: Negative for nausea and vomiting  Musculoskeletal: Negative for back pain  Neurological: Negative for weakness and numbness  All other systems reviewed and are negative  Physical Exam  Physical Exam   Constitutional: She appears well-developed and well-nourished  HENT:   Nose: Nose normal    Mouth/Throat: Oropharynx is clear and moist    Eyes: Conjunctivae are normal    Neck: Neck supple  Cardiovascular: Normal rate and regular rhythm  No murmur heard  Pulmonary/Chest: Effort normal and breath sounds normal  No respiratory distress  Abdominal: Soft  There is no tenderness  Musculoskeletal: She exhibits no deformity  Neurological: She is alert  Skin: Skin is warm  Psychiatric: She has a normal mood and affect  Nursing note and vitals reviewed        Vital Signs  ED Triage Vitals   Temperature Pulse Respirations Blood Pressure SpO2   01/13/20 1235 01/13/20 1235 01/13/20 1235 01/13/20 1235 01/13/20 1235   98 5 °F (36 9 °C) 79 18 148/85 97 %      Temp Source Heart Rate Source Patient Position - Orthostatic VS BP Location FiO2 (%)   01/13/20 1235 01/13/20 1235 01/13/20 1415 01/13/20 1235 --   Temporal Monitor Lying Left arm       Pain Score       01/13/20 1235       No Pain           Vitals:    01/13/20 1235 01/13/20 1315 01/13/20 1415   BP: 148/85 146/77 132/60   Pulse: 79 78 78   Patient Position - Orthostatic VS:   Lying         Visual Acuity      ED Medications  Medications - No data to display    Diagnostic Studies  Results Reviewed     Procedure Component Value Units Date/Time    Comprehensive metabolic panel [[id number]]  (Abnormal) Collected:  01/13/20 1308    Lab Status:  Final result Specimen:  Blood from Arm, Right Updated:  01/13/20 1352     Sodium 143 mmol/L      Potassium 4 2 mmol/L      Chloride 104 mmol/L      CO2 33 mmol/L      ANION GAP 6 mmol/L      BUN 17 mg/dL      Creatinine 0 71 mg/dL      Glucose 95 mg/dL      Calcium 9 4 mg/dL      AST 13 U/L      ALT 18 U/L      Alkaline Phosphatase 116 U/L      Total Protein 7 4 g/dL      Albumin 3 6 g/dL      Total Bilirubin 0 50 mg/dL      eGFR 91 ml/min/1 73sq m     Narrative:       [place] guidelines for Chronic Kidney Disease (CKD):     Stage 1 with normal or high GFR (GFR > 90 mL/min/1 73 square meters)    Stage 2 Mild CKD (GFR = 60-89 mL/min/1 73 square meters)    Stage 3A Moderate CKD (GFR = 45-59 mL/min/1 73 square meters)    Stage 3B Moderate CKD (GFR = 30-44 mL/min/1 73 square meters)    Stage 4 Severe CKD (GFR = 15-29 mL/min/1 73 square meters)    Stage 5 End Stage CKD (GFR <15 mL/min/1 73 square meters)  Note: GFR calculation is accurate only with a steady state creatinine    Troponin I [[id number]]  (Normal) Collected:  01/13/20 1308    Lab Status:  Final result Specimen:  Blood from Arm, Right Updated:  01/13/20 1337     Troponin I <0 02 ng/mL     CBC and differential [[id number]]  (Abnormal) Collected:  01/13/20 1308    Lab Status:  Final result Specimen: Blood from Arm, Right Updated:  01/13/20 1314     WBC 7 58 Thousand/uL      RBC 4 67 Million/uL      Hemoglobin 14 0 g/dL      Hematocrit 41 7 %      MCV 89 fL      MCH 30 0 pg      MCHC 33 6 g/dL      RDW 12 6 %      MPV 10 1 fL      Platelets 826 Thousands/uL      nRBC 0 /100 WBCs      Neutrophils Relative 75 %      Immat GRANS % 1 %      Lymphocytes Relative 12 %      Monocytes Relative 9 %      Eosinophils Relative 2 %      Basophils Relative 1 %      Neutrophils Absolute 5 80 Thousands/µL      Immature Grans Absolute 0 05 Thousand/uL      Lymphocytes Absolute 0 93 Thousands/µL      Monocytes Absolute 0 65 Thousand/µL      Eosinophils Absolute 0 11 Thousand/µL      Basophils Absolute 0 04 Thousands/µL                  XR chest 1 view portable   Final Result by Heydi Hoover MD (01/13 1431)      No acute cardiopulmonary disease              Workstation performed: [id number]                    Procedures  ECG 12 Lead Documentation Only  Date/Time: 1/13/2020 12:45 PM  Performed by: Prosper Cabrales DO  Authorized by: Prosper Cabrales DO     ECG reviewed by me, the ED Provider: yes    Patient location:  ED  Previous ECG:     Previous ECG:  Compared to current    Similarity:  No change  Interpretation:     Interpretation: non-specific    Rate:     ECG rate:  81    ECG rate assessment: normal    Rhythm:     Rhythm: sinus rhythm    Ectopy:     Ectopy: none    QRS:     QRS axis:  Normal  ST segments:     ST segments:  Non-specific  T waves:     T waves: non-specific               ED Course  ED Course as of Jan 13 1534   Mon Jan 13, 2020   1415 HEART score of 4, but pt symptoms not concerning and doesn't need to stay             HEART Risk Score      Most Recent Value   History  0 Filed at: 01/13/2020 1415   ECG  1 Filed at: 01/13/2020 1415   Age  1 Filed at: 01/13/2020 1415   Risk Factors  2 Filed at: 01/13/2020 1415   Troponin  0 Filed at: 01/13/2020 1415   Heart Score Risk Calculator   History  0 Filed at: 01/13/2020 1415   ECG  1 Filed at: 01/13/2020 1415   Age  1 Filed at: 01/13/2020 1415   Risk Factors  2 Filed at: 01/13/2020 1415   Troponin  0 Filed at: 01/13/2020 1415   HEART Score  4 Filed at: 01/13/2020 1415   HEART Score  4 Filed at: 01/13/2020 1415                            MDM  Number of Diagnoses or Management Options  Anxiety: new and requires workup  Fatigue: new and requires workup  Palpitations: new and requires workup  Diagnosis management comments: Palpitations and fatigue w/ hx of CAD  Follows w/ cards, last seen in October, doing well  Sent in b/c pcm didn't have old ekg to compare anything to  EKG here is essentially negative  Pt w/ recent death in family that has caused some anxiety so not unreasonable  Pt has had thyroid studies in past that were negative  Will d/c home w/ outpt cards f/u prn and small supply of anxiety med       Amount and/or Complexity of Data Reviewed  Clinical lab tests: ordered and reviewed  Tests in the radiology section of CPT®: ordered and reviewed  Tests in the medicine section of CPT®: reviewed and ordered  Obtain history from someone other than the patient: yes  Independent visualization of images, tracings, or specimens: yes          Disposition  Final diagnoses:   Palpitations   Fatigue   Anxiety     Time reflects when diagnosis was documented in both MDM as applicable and the Disposition within this note     Time User Action Codes Description Comment    1/13/2020  2:18 PM RubykyKhangky L Add [R00 2] Palpitations     1/13/2020  2:18 PM RubykyKhangky L Add [R53 83] Fatigue     1/13/2020  2:18 PM RubykyMaggie L Add [F41 8] Situational anxiety     1/13/2020  2:19 PM LazlaurakyKhangky L Add [F41 9] Anxiety     1/13/2020  3:25 PM Kayleigh Mann Remove [F41 8] Situational anxiety       ED Disposition     ED Disposition Condition Date/Time Comment    Discharge Stable Mon Jan 13, 2020  2:18 PM Kaylen Mendez discharge to home/self care              Follow-up Information     Follow up With Specialties Details Why [id number] Garcia DO Family Medicine Schedule an appointment as soon as possible for a visit  If symptoms worsen [address] [id number]            Discharge Medication List as of 1/13/2020  2:20 PM      START taking these medications    Details   ALPRAZolam (XANAX) 0 25 mg tablet Take 1 tablet (0 25 mg total) by mouth 3 (three) times a day as needed for anxiety for up to 10 days, Starting Mon 1/13/2020, Until Thu 1/23/2020, Normal         CONTINUE these medications which have NOT CHANGED    Details   b complex vitamins tablet Take 1 tablet by mouth daily, Historical Med      albuterol (PROVENTIL HFA,VENTOLIN HFA) 90 mcg/act inhaler Inhale 2 puffs as needed, Starting Thu 1/25/2018, Historical Med      amLODIPine (NORVASC) 5 mg tablet Take 1 tablet (5 mg total) by mouth daily, Starting Sat 4/7/2018, Normal      aspirin 81 mg chewable tablet Chew 1 tablet (81 mg total) daily, Starting Sat 4/7/2018, Normal      atorvastatin (LIPITOR) 40 mg tablet Take 1 tablet (40 mg total) by mouth every evening, Starting Sat 4/7/2018, Normal      cetirizine (ZYRTEC ALLERGY) 10 mg tablet Take 1 tablet by mouth daily as needed, Historical Med      co-enzyme Q-10 30 MG capsule Take 100 mg by mouth 3 (three) times a day , Historical Med      cyanocobalamin (VITAMIN B-12) 100 mcg tablet Take by mouth daily, Historical Med      diclofenac sodium (VOLTAREN) 1 % Apply 2 g topically 4 (four) times a day, Starting Wed 6/26/2019, Normal      DULoxetine (CYMBALTA) 20 mg capsule 80 mg daily , Starting Sat 3/24/2018, Historical Med      fluticasone (FLONASE) 50 mcg/act nasal spray 1 spray into each nostril daily, Historical Med      lisinopril-hydrochlorothiazide (PRINZIDE,ZESTORETIC) 10-12 5 MG per tablet Take 1 tablet by mouth daily, Starting Thu 9/26/2019, Historical Med      metoclopramide (REGLAN) 5 mg tablet TAKE 1 TABLET AT ONSET OF HEADACHE TWICE A DAY ORALLY FOR 14 DAYS, Historical Med      metoprolol succinate (TOPROL-XL) 100 mg 24 hr tablet Take 100 mg by mouth daily  , Starting Sat 3/24/2018, Historical Med      naproxen (EC NAPROSYN) 500 MG EC tablet One tablet p o  b i d  P r n  for pain, Normal      nitroglycerin (NITROSTAT) 0 4 mg SL tablet Place 1 tablet (0 4 mg total) under the tongue every 5 (five) minutes as needed for chest pain, Starting Tue 4/24/2018, Normal      omeprazole (PRILOSEC OTC) 20 MG tablet Take 1 tablet by mouth daily, Historical Med      rizatriptan (MAXALT-MLT) 5 MG disintegrating tablet Starting Fri 9/27/2019, Historical Med      traMADol (ULTRAM) 50 mg tablet Take 50 mg by mouth every 6 (six) hours as needed for moderate pain (for migrain pain), Historical Med           No discharge procedures on file      ED Provider  Electronically Signed by           Gilford Comings, DO  01/13/20 4626

## 2020-01-14 LAB
ATRIAL RATE: 81 BPM
P AXIS: 46 DEGREES
PR INTERVAL: 142 MS
QRS AXIS: -1 DEGREES
QRSD INTERVAL: 82 MS
QT INTERVAL: 398 MS
QTC INTERVAL: 462 MS
T WAVE AXIS: 55 DEGREES
VENTRICULAR RATE: 81 BPM

## 2020-01-14 PROCEDURE — 93010 ELECTROCARDIOGRAM REPORT: CPT | Performed by: INTERNAL MEDICINE

## 2020-11-18 ENCOUNTER — APPOINTMENT (OUTPATIENT)
Dept: LAB | Facility: CLINIC | Age: 65
End: 2020-11-18
Payer: COMMERCIAL

## 2020-11-18 ENCOUNTER — TRANSCRIBE ORDERS (OUTPATIENT)
Dept: ADMINISTRATIVE | Facility: HOSPITAL | Age: 65
End: 2020-11-18

## 2020-11-18 DIAGNOSIS — F45.8 ANXIETY HYPERVENTILATION: ICD-10-CM

## 2020-11-18 DIAGNOSIS — M25.521 RIGHT ELBOW PAIN: ICD-10-CM

## 2020-11-18 DIAGNOSIS — Z85.828 PERSONAL HISTORY OF OTHER MALIGNANT NEOPLASM OF SKIN: ICD-10-CM

## 2020-11-18 DIAGNOSIS — G47.33 OBSTRUCTIVE SLEEP APNEA (ADULT) (PEDIATRIC): ICD-10-CM

## 2020-11-18 DIAGNOSIS — R40.0 SLEEPINESS: ICD-10-CM

## 2020-11-18 DIAGNOSIS — E66.01 MORBID OBESITY (HCC): ICD-10-CM

## 2020-11-18 DIAGNOSIS — F41.9 ANXIETY HYPERVENTILATION: ICD-10-CM

## 2020-11-18 DIAGNOSIS — G43.109 MIGRAINE WITH AURA AND WITHOUT STATUS MIGRAINOSUS, NOT INTRACTABLE: ICD-10-CM

## 2020-11-18 DIAGNOSIS — I10 ESSENTIAL HYPERTENSION, MALIGNANT: ICD-10-CM

## 2020-11-18 DIAGNOSIS — I21.4 ACUTE MYOCARDIAL INFARCTION, SUBENDOCARDIAL INFARCTION, INITIAL EPISODE OF CARE (HCC): ICD-10-CM

## 2020-11-18 DIAGNOSIS — I10 ESSENTIAL HYPERTENSION, MALIGNANT: Primary | ICD-10-CM

## 2020-11-18 LAB
ALBUMIN SERPL BCP-MCNC: 3.7 G/DL (ref 3.5–5)
ALP SERPL-CCNC: 112 U/L (ref 46–116)
ALT SERPL W P-5'-P-CCNC: 16 U/L (ref 12–78)
ANION GAP SERPL CALCULATED.3IONS-SCNC: 8 MMOL/L (ref 4–13)
AST SERPL W P-5'-P-CCNC: 17 U/L (ref 5–45)
BASOPHILS NFR BLD AUTO: 1 % (ref 0–1)
BILIRUB SERPL-MCNC: 0.59 MG/DL (ref 0.2–1)
BUN SERPL-MCNC: 18 MG/DL (ref 5–25)
CALCIUM SERPL-MCNC: 9.8 MG/DL (ref 8.3–10.1)
CHLORIDE SERPL-SCNC: 104 MMOL/L (ref 100–108)
CHOLEST SERPL-MCNC: 141 MG/DL (ref 50–200)
CO2 SERPL-SCNC: 28 MMOL/L (ref 21–32)
CREAT SERPL-MCNC: 0.81 MG/DL (ref 0.6–1.3)
EOSINOPHIL NFR BLD AUTO: 2 % (ref 0–6)
ERYTHROCYTE [DISTWIDTH] IN BLOOD BY AUTOMATED COUNT: 12.4 % (ref 11.6–15.1)
EST. AVERAGE GLUCOSE BLD GHB EST-MCNC: 131 MG/DL
GFR SERPL CREATININE-BSD FRML MDRD: 77 ML/MIN/1.73SQ M
GLUCOSE P FAST SERPL-MCNC: 134 MG/DL (ref 65–99)
HBA1C MFR BLD: 6.2 %
HCT VFR BLD AUTO: 45.1 % (ref 34.8–46.1)
HDLC SERPL-MCNC: 45 MG/DL
HGB BLD-MCNC: 14.7 G/DL (ref 11.5–15.4)
IMM GRANULOCYTES NFR BLD AUTO: 1 % (ref 0–2)
LDLC SERPL CALC-MCNC: 61 MG/DL (ref 0–100)
LYMPHOCYTES NFR BLD AUTO: 10 % (ref 14–44)
MCH RBC QN AUTO: 29.5 PG (ref 26.8–34.3)
MCHC RBC AUTO-ENTMCNC: 32.6 G/DL (ref 31.4–37.4)
MCV RBC AUTO: 90 FL (ref 82–98)
MONOCYTES NFR BLD AUTO: 8 % (ref 4–12)
NEUTS SEG NFR BLD AUTO: 78 % (ref 43–75)
NONHDLC SERPL-MCNC: 96 MG/DL
NRBC BLD AUTO-RTO: 0 /100 WBCS
PLATELET # BLD AUTO: 150 THOUSANDS/UL (ref 149–390)
PMV BLD AUTO: 12.3 FL (ref 8.9–12.7)
POTASSIUM SERPL-SCNC: 4 MMOL/L (ref 3.5–5.3)
PROT SERPL-MCNC: 7.2 G/DL (ref 6.4–8.2)
RBC # BLD AUTO: 4.99 MILLION/UL (ref 3.81–5.12)
SODIUM SERPL-SCNC: 140 MMOL/L (ref 136–145)
TRIGL SERPL-MCNC: 175 MG/DL
TSH SERPL DL<=0.05 MIU/L-ACNC: 3.46 UIU/ML (ref 0.36–3.74)
WBC # BLD AUTO: 9.11 THOUSAND/UL (ref 4.31–10.16)

## 2020-11-18 PROCEDURE — 80061 LIPID PANEL: CPT

## 2020-11-18 PROCEDURE — 84443 ASSAY THYROID STIM HORMONE: CPT

## 2020-11-18 PROCEDURE — 80053 COMPREHEN METABOLIC PANEL: CPT

## 2020-11-18 PROCEDURE — 36415 COLL VENOUS BLD VENIPUNCTURE: CPT

## 2020-11-18 PROCEDURE — 85025 COMPLETE CBC W/AUTO DIFF WBC: CPT

## 2020-11-18 PROCEDURE — 83036 HEMOGLOBIN GLYCOSYLATED A1C: CPT

## 2021-03-10 DIAGNOSIS — Z23 ENCOUNTER FOR IMMUNIZATION: ICD-10-CM

## 2021-04-14 ENCOUNTER — TELEMEDICINE (OUTPATIENT)
Dept: CARDIOLOGY CLINIC | Facility: CLINIC | Age: 66
End: 2021-04-14
Payer: COMMERCIAL

## 2021-04-14 VITALS
DIASTOLIC BLOOD PRESSURE: 77 MMHG | HEART RATE: 86 BPM | HEIGHT: 65 IN | SYSTOLIC BLOOD PRESSURE: 118 MMHG | BODY MASS INDEX: 45.11 KG/M2

## 2021-04-14 DIAGNOSIS — I25.10 ATHEROSCLEROSIS OF NATIVE CORONARY ARTERY OF NATIVE HEART WITHOUT ANGINA PECTORIS: Primary | ICD-10-CM

## 2021-04-14 DIAGNOSIS — E78.00 HYPERCHOLESTEREMIA: ICD-10-CM

## 2021-04-14 DIAGNOSIS — I10 ESSENTIAL HYPERTENSION: ICD-10-CM

## 2021-04-14 DIAGNOSIS — I25.2 OLD MYOCARDIAL INFARCTION: ICD-10-CM

## 2021-04-14 PROCEDURE — 99214 OFFICE O/P EST MOD 30 MIN: CPT | Performed by: INTERNAL MEDICINE

## 2021-04-14 RX ORDER — NITROGLYCERIN 0.4 MG/1
0.4 TABLET SUBLINGUAL
Qty: 25 TABLET | Refills: 1 | Status: SHIPPED | OUTPATIENT
Start: 2021-04-14

## 2021-04-14 NOTE — PROGRESS NOTES
Virtual Regular Visit      Assessment/Plan:    Problem List Items Addressed This Visit        Cardiovascular and Mediastinum    Essential hypertension    Old myocardial infarction    Relevant Medications    nitroglycerin (NITROSTAT) 0 4 mg SL tablet    Atherosclerosis of native coronary artery of native heart without angina pectoris - Primary    Relevant Medications    nitroglycerin (NITROSTAT) 0 4 mg SL tablet       Other    Hypercholesteremia               Reason for visit is   Chief Complaint   Patient presents with    Follow-up     year    Virtual Regular Visit        Encounter provider Javi Laguna MD    Provider located at [address] [address] [phone]      Recent Visits  No visits were found meeting these conditions  Showing recent visits within past 7 days and meeting all other requirements     Today's Visits  Date Type Provider Dept   04/14/21 Telemedicine Javi Laguna MD  [facility]   Showing today's visits and meeting all other requirements     Future Appointments  No visits were found meeting these conditions  Showing future appointments within next 150 days and meeting all other requirements        The patient was identified by name and date of birth  Vance Fat was informed that this is a telemedicine visit and that the visit is being conducted through [facility] and patient was informed that this is a secure, HIPAA-compliant platform  She agrees to proceed     My office door was closed  No one else was in the room  She acknowledged consent and understanding of privacy and security of the video platform  The patient has agreed to participate and understands they can discontinue the visit at any time  Patient is aware this is a billable service  Discussion/Summary:     1   GHAZALA Bates had a NSTEMI in April of 2018 that showed a distal LAD occlusion with focal dissection  This was not amenable to intervention, and she has done well with medical management  She is without symptoms of angina  She knows to call or seek medical attention if any symptoms suggestive of angina recur  No changes were made to her medical therapy and we will see her back in 1 year      2  Hypertension - Well controlled  She should periodically have her blood pressure checked at home      3  Hypercholesterolemia - Her last LDL was at goal at 61  She will continue to have blood work followed closely  She will remain on the same dose of atorvastatin  We will see her back in 1 year  Interval History:      Donnald Runner comes is having a virtual very follow-up today  given her coronary artery disease history  Back in April 2018 she had an NSTEMI in which she presented with chest pain and anterior T-wave inversions on her ECG  She was found to have a distal LAD occlusion with a focal dissection  This was not amendable to intervention, as this was a small distal vessel and she was treated medically  Her LV function was normal on echocardiogram with an apical regional wall motion abnormality  She has done well since  For a while after her myocardial infarction she had issues with shortness of breath and fatigue, but this has improved  Last year a stress nuclear study was ordered, which she did not have done as she was having issues with her knee and then ended up breaking her ankle      Overall Donnald Runner feels well  She still continues to have shortness of breath with exertion, but this has not changed  She also battles with chronic fatigue which is multifactorial   She has been trying to become more active as there was a period of time there that she did not exercise or keep active, after her myocardial infarction and with the addition of orthopedic issues  She did have 1 episode of chest pain, that occurred during anxiety    No exertional chest pain or any other symptoms suggestive of angina  No other symptoms or signs of CHF  No palpitations, lightheadedness or any recent syncope        Past Medical History:   Diagnosis Date    Anxiety     GERD (gastroesophageal reflux disease)     Hypertension     MI, old     Migraine        Past Surgical History:   Procedure Laterality Date    BACK SURGERY      HYSTERECTOMY      LUMBAR DISCECTOMY         Current Outpatient Medications   Medication Sig Dispense Refill    albuterol (PROVENTIL HFA,VENTOLIN HFA) 90 mcg/act inhaler Inhale 2 puffs as needed      ALPRAZolam (XANAX) 0 25 mg tablet Take 1 tablet (0 25 mg total) by mouth 3 (three) times a day as needed for anxiety for up to 10 days 6 tablet 0    amLODIPine (NORVASC) 5 mg tablet Take 1 tablet (5 mg total) by mouth daily 30 tablet 1    aspirin 81 mg chewable tablet Chew 1 tablet (81 mg total) daily (Patient taking differently: Chew 325 mg daily ) 30 tablet 1    atorvastatin (LIPITOR) 40 mg tablet Take 1 tablet (40 mg total) by mouth every evening 30 tablet 1    b complex vitamins tablet Take 1 tablet by mouth daily      cetirizine (ZYRTEC ALLERGY) 10 mg tablet Take 1 tablet by mouth daily as needed      cyanocobalamin (VITAMIN B-12) 100 mcg tablet Take by mouth daily      fluticasone (FLONASE) 50 mcg/act nasal spray 1 spray into each nostril daily      lisinopril-hydrochlorothiazide (PRINZIDE,ZESTORETIC) 10-12 5 MG per tablet Take 1 tablet by mouth daily  0    metoclopramide (REGLAN) 5 mg tablet TAKE 1 TABLET AT ONSET OF HEADACHE TWICE A DAY ORALLY FOR 14 DAYS  0    metoprolol succinate (TOPROL-XL) 100 mg 24 hr tablet Take 100 mg by mouth daily        nitroglycerin (NITROSTAT) 0 4 mg SL tablet Place 1 tablet (0 4 mg total) under the tongue every 5 (five) minutes as needed for chest pain 25 tablet 1    NON FORMULARY       omeprazole (PRILOSEC OTC) 20 MG tablet Take 1 tablet by mouth daily      co-enzyme Q-10 30 MG capsule Take 100 mg by mouth 3 (three) times a day       DULoxetine (CYMBALTA) 20 mg capsule 80 mg daily        No current facility-administered medications for this visit  No Known Allergies    Review of Systems   Constitutional: Positive for fatigue  HENT: Negative  Eyes: Negative  Respiratory: Positive for shortness of breath  Cardiovascular: Negative  Gastrointestinal: Negative  Musculoskeletal: Negative  Skin: Negative  Allergic/Immunologic: Negative  Neurological: Negative  Hematological: Negative  Psychiatric/Behavioral: Negative  All other systems reviewed and are negative  Video Exam    Vitals:    04/14/21 1135   BP: 118/77   BP Location: Left arm   Patient Position: Sitting   Cuff Size: Standard   Pulse: 86   Height: 5' 5" (1 651 m)       Physical Exam  Vitals signs and nursing note reviewed  Constitutional:       General: She is not in acute distress  Appearance: Normal appearance  She is not ill-appearing, toxic-appearing or diaphoretic  HENT:      Head: Normocephalic and atraumatic  Nose: Nose normal    Eyes:      Extraocular Movements: Extraocular movements intact  Pupils: Pupils are equal, round, and reactive to light  Neck:      Musculoskeletal: Normal range of motion  Cardiovascular:      Rate and Rhythm: Normal rate  Pulmonary:      Effort: Pulmonary effort is normal    Musculoskeletal: Normal range of motion  Skin:     General: Skin is dry  Neurological:      General: No focal deficit present  Mental Status: She is alert and oriented to person, place, and time  Psychiatric:         Mood and Affect: Mood normal          Behavior: Behavior normal          Thought Content: Thought content normal          Judgment: Judgment normal           I spent 25 minutes with patient today in which greater than 50% of the time was spent in counseling/coordination of care regarding her CAD        VIRTUAL VISIT DISCLAIMER    Ashley Brown acknowledges that she has consented to an online visit or consultation  She understands that the online visit is based solely on information provided by her, and that, in the absence of a face-to-face physical evaluation by the physician, the diagnosis she receives is both limited and provisional in terms of accuracy and completeness  This is not intended to replace a full medical face-to-face evaluation by the physician  Maninder Polanco understands and accepts these terms

## 2022-11-02 ENCOUNTER — APPOINTMENT (OUTPATIENT)
Dept: LAB | Facility: CLINIC | Age: 67
End: 2022-11-02

## 2022-11-02 DIAGNOSIS — R53.83 TIREDNESS: ICD-10-CM

## 2022-11-02 DIAGNOSIS — I10 ESSENTIAL HYPERTENSION, MALIGNANT: ICD-10-CM

## 2022-11-02 DIAGNOSIS — E66.01 MORBID OBESITY (HCC): ICD-10-CM

## 2022-11-02 DIAGNOSIS — F41.9 ANXIETY: ICD-10-CM

## 2022-11-02 DIAGNOSIS — G43.109 COMPLICATED MIGRAINE: ICD-10-CM

## 2022-11-02 DIAGNOSIS — I21.4 ACUTE MYOCARDIAL INFARCTION, SUBENDOCARDIAL INFARCTION, INITIAL EPISODE OF CARE (HCC): ICD-10-CM

## 2022-11-02 DIAGNOSIS — J30.2 SEASONAL ALLERGIC RHINITIS, UNSPECIFIED TRIGGER: ICD-10-CM

## 2022-11-02 LAB
ALBUMIN SERPL BCP-MCNC: 3.5 G/DL (ref 3.5–5)
ALP SERPL-CCNC: 97 U/L (ref 46–116)
ALT SERPL W P-5'-P-CCNC: 18 U/L (ref 12–78)
ANION GAP SERPL CALCULATED.3IONS-SCNC: 4 MMOL/L (ref 4–13)
AST SERPL W P-5'-P-CCNC: 10 U/L (ref 5–45)
BASOPHILS # BLD AUTO: 0.04 THOUSANDS/ÂΜL (ref 0–0.1)
BASOPHILS NFR BLD AUTO: 1 % (ref 0–1)
BILIRUB SERPL-MCNC: 0.62 MG/DL (ref 0.2–1)
BUN SERPL-MCNC: 19 MG/DL (ref 5–25)
CALCIUM SERPL-MCNC: 9.7 MG/DL (ref 8.3–10.1)
CHLORIDE SERPL-SCNC: 106 MMOL/L (ref 96–108)
CHOLEST SERPL-MCNC: 164 MG/DL
CO2 SERPL-SCNC: 30 MMOL/L (ref 21–32)
CREAT SERPL-MCNC: 0.82 MG/DL (ref 0.6–1.3)
EOSINOPHIL # BLD AUTO: 0.16 THOUSAND/ÂΜL (ref 0–0.61)
EOSINOPHIL NFR BLD AUTO: 2 % (ref 0–6)
ERYTHROCYTE [DISTWIDTH] IN BLOOD BY AUTOMATED COUNT: 12.7 % (ref 11.6–15.1)
EST. AVERAGE GLUCOSE BLD GHB EST-MCNC: 134 MG/DL
GFR SERPL CREATININE-BSD FRML MDRD: 74 ML/MIN/1.73SQ M
GLUCOSE P FAST SERPL-MCNC: 144 MG/DL (ref 65–99)
HBA1C MFR BLD: 6.3 %
HCT VFR BLD AUTO: 42 % (ref 34.8–46.1)
HDLC SERPL-MCNC: 46 MG/DL
HGB BLD-MCNC: 13.3 G/DL (ref 11.5–15.4)
IMM GRANULOCYTES # BLD AUTO: 0.03 THOUSAND/UL (ref 0–0.2)
IMM GRANULOCYTES NFR BLD AUTO: 0 % (ref 0–2)
LDLC SERPL CALC-MCNC: 82 MG/DL (ref 0–100)
LYMPHOCYTES # BLD AUTO: 0.69 THOUSANDS/ÂΜL (ref 0.6–4.47)
LYMPHOCYTES NFR BLD AUTO: 10 % (ref 14–44)
MCH RBC QN AUTO: 28.4 PG (ref 26.8–34.3)
MCHC RBC AUTO-ENTMCNC: 31.7 G/DL (ref 31.4–37.4)
MCV RBC AUTO: 90 FL (ref 82–98)
MONOCYTES # BLD AUTO: 0.62 THOUSAND/ÂΜL (ref 0.17–1.22)
MONOCYTES NFR BLD AUTO: 9 % (ref 4–12)
NEUTROPHILS # BLD AUTO: 5.46 THOUSANDS/ÂΜL (ref 1.85–7.62)
NEUTS SEG NFR BLD AUTO: 78 % (ref 43–75)
NONHDLC SERPL-MCNC: 118 MG/DL
NRBC BLD AUTO-RTO: 0 /100 WBCS
PLATELET # BLD AUTO: 151 THOUSANDS/UL (ref 149–390)
PMV BLD AUTO: 10.9 FL (ref 8.9–12.7)
POTASSIUM SERPL-SCNC: 3.8 MMOL/L (ref 3.5–5.3)
PROT SERPL-MCNC: 6.9 G/DL (ref 6.4–8.4)
RBC # BLD AUTO: 4.69 MILLION/UL (ref 3.81–5.12)
SODIUM SERPL-SCNC: 140 MMOL/L (ref 135–147)
TRIGL SERPL-MCNC: 178 MG/DL
TSH SERPL DL<=0.05 MIU/L-ACNC: 2.94 UIU/ML (ref 0.45–4.5)
WBC # BLD AUTO: 7 THOUSAND/UL (ref 4.31–10.16)

## 2023-11-18 ENCOUNTER — APPOINTMENT (EMERGENCY)
Dept: RADIOLOGY | Facility: HOSPITAL | Age: 68
End: 2023-11-18
Payer: MEDICARE

## 2023-11-18 ENCOUNTER — HOSPITAL ENCOUNTER (INPATIENT)
Facility: HOSPITAL | Age: 68
LOS: 3 days | Discharge: HOME | End: 2023-11-21
Attending: STUDENT IN AN ORGANIZED HEALTH CARE EDUCATION/TRAINING PROGRAM | Admitting: STUDENT IN AN ORGANIZED HEALTH CARE EDUCATION/TRAINING PROGRAM
Payer: MEDICARE

## 2023-11-18 ENCOUNTER — HOSPITAL ENCOUNTER (EMERGENCY)
Facility: HOSPITAL | Age: 68
End: 2023-11-18
Attending: EMERGENCY MEDICINE
Payer: MEDICARE

## 2023-11-18 VITALS
BODY MASS INDEX: 45.86 KG/M2 | HEART RATE: 69 BPM | TEMPERATURE: 98.2 F | RESPIRATION RATE: 20 BRPM | SYSTOLIC BLOOD PRESSURE: 132 MMHG | OXYGEN SATURATION: 93 % | DIASTOLIC BLOOD PRESSURE: 88 MMHG | WEIGHT: 275.57 LBS

## 2023-11-18 DIAGNOSIS — I21.4 NSTEMI (NON-ST ELEVATED MYOCARDIAL INFARCTION) (HCC): Primary | ICD-10-CM

## 2023-11-18 LAB
2HR DELTA HS TROPONIN: 182 NG/L
2HR DELTA HS TROPONIN: 346 NG/L
4HR DELTA HS TROPONIN: 43 NG/L
ALBUMIN SERPL BCP-MCNC: 4.2 G/DL (ref 3.5–5)
ALP SERPL-CCNC: 92 U/L (ref 34–104)
ALT SERPL W P-5'-P-CCNC: 10 U/L (ref 7–52)
ANION GAP SERPL CALCULATED.3IONS-SCNC: 10 MMOL/L
APTT PPP: 27 SECONDS (ref 23–37)
APTT PPP: 37 SECONDS (ref 23–37)
AST SERPL W P-5'-P-CCNC: 21 U/L (ref 13–39)
BASOPHILS # BLD AUTO: 0.04 THOUSANDS/ÂΜL (ref 0–0.1)
BASOPHILS NFR BLD AUTO: 0 % (ref 0–1)
BILIRUB SERPL-MCNC: 0.78 MG/DL (ref 0.2–1)
BUN SERPL-MCNC: 18 MG/DL (ref 5–25)
CALCIUM SERPL-MCNC: 9.3 MG/DL (ref 8.4–10.2)
CARDIAC TROPONIN I PNL SERPL HS: 1357 NG/L
CARDIAC TROPONIN I PNL SERPL HS: 1461 NG/L
CARDIAC TROPONIN I PNL SERPL HS: 1504 NG/L
CARDIAC TROPONIN I PNL SERPL HS: 1643 NG/L
CARDIAC TROPONIN I PNL SERPL HS: 1703 NG/L
CHLORIDE SERPL-SCNC: 102 MMOL/L (ref 96–108)
CO2 SERPL-SCNC: 28 MMOL/L (ref 21–32)
CREAT SERPL-MCNC: 0.64 MG/DL (ref 0.6–1.3)
EOSINOPHIL # BLD AUTO: 0.15 THOUSAND/ÂΜL (ref 0–0.61)
EOSINOPHIL NFR BLD AUTO: 2 % (ref 0–6)
ERYTHROCYTE [DISTWIDTH] IN BLOOD BY AUTOMATED COUNT: 12.9 % (ref 11.6–15.1)
GFR SERPL CREATININE-BSD FRML MDRD: 92 ML/MIN/1.73SQ M
GLUCOSE SERPL-MCNC: 152 MG/DL (ref 65–140)
HCT VFR BLD AUTO: 43.8 % (ref 34.8–46.1)
HGB BLD-MCNC: 14.5 G/DL (ref 11.5–15.4)
IMM GRANULOCYTES # BLD AUTO: 0.07 THOUSAND/UL (ref 0–0.2)
IMM GRANULOCYTES NFR BLD AUTO: 1 % (ref 0–2)
INR PPP: 1.04 (ref 0.84–1.19)
LIPASE SERPL-CCNC: <6 U/L (ref 11–82)
LYMPHOCYTES # BLD AUTO: 1.05 THOUSANDS/ÂΜL (ref 0.6–4.47)
LYMPHOCYTES NFR BLD AUTO: 11 % (ref 14–44)
MCH RBC QN AUTO: 28.7 PG (ref 26.8–34.3)
MCHC RBC AUTO-ENTMCNC: 33.1 G/DL (ref 31.4–37.4)
MCV RBC AUTO: 87 FL (ref 82–98)
MONOCYTES # BLD AUTO: 0.69 THOUSAND/ÂΜL (ref 0.17–1.22)
MONOCYTES NFR BLD AUTO: 7 % (ref 4–12)
NEUTROPHILS # BLD AUTO: 8.01 THOUSANDS/ÂΜL (ref 1.85–7.62)
NEUTS SEG NFR BLD AUTO: 79 % (ref 43–75)
NRBC BLD AUTO-RTO: 0 /100 WBCS
PLATELET # BLD AUTO: 262 THOUSANDS/UL (ref 149–390)
PMV BLD AUTO: 10.5 FL (ref 8.9–12.7)
POTASSIUM SERPL-SCNC: 3.5 MMOL/L (ref 3.5–5.3)
PROT SERPL-MCNC: 7.2 G/DL (ref 6.4–8.4)
PROTHROMBIN TIME: 14 SECONDS (ref 11.6–14.5)
RBC # BLD AUTO: 5.05 MILLION/UL (ref 3.81–5.12)
SODIUM SERPL-SCNC: 140 MMOL/L (ref 135–147)
WBC # BLD AUTO: 10.01 THOUSAND/UL (ref 4.31–10.16)

## 2023-11-18 PROCEDURE — 99285 EMERGENCY DEPT VISIT HI MDM: CPT | Performed by: EMERGENCY MEDICINE

## 2023-11-18 PROCEDURE — 99285 EMERGENCY DEPT VISIT HI MDM: CPT

## 2023-11-18 PROCEDURE — 85610 PROTHROMBIN TIME: CPT | Performed by: EMERGENCY MEDICINE

## 2023-11-18 PROCEDURE — 85730 THROMBOPLASTIN TIME PARTIAL: CPT | Performed by: EMERGENCY MEDICINE

## 2023-11-18 PROCEDURE — 96374 THER/PROPH/DIAG INJ IV PUSH: CPT

## 2023-11-18 PROCEDURE — 85730 THROMBOPLASTIN TIME PARTIAL: CPT | Performed by: STUDENT IN AN ORGANIZED HEALTH CARE EDUCATION/TRAINING PROGRAM

## 2023-11-18 PROCEDURE — 93005 ELECTROCARDIOGRAM TRACING: CPT

## 2023-11-18 PROCEDURE — 80053 COMPREHEN METABOLIC PANEL: CPT | Performed by: EMERGENCY MEDICINE

## 2023-11-18 PROCEDURE — 83690 ASSAY OF LIPASE: CPT | Performed by: EMERGENCY MEDICINE

## 2023-11-18 PROCEDURE — 85025 COMPLETE CBC W/AUTO DIFF WBC: CPT | Performed by: EMERGENCY MEDICINE

## 2023-11-18 PROCEDURE — 84484 ASSAY OF TROPONIN QUANT: CPT | Performed by: STUDENT IN AN ORGANIZED HEALTH CARE EDUCATION/TRAINING PROGRAM

## 2023-11-18 PROCEDURE — 96365 THER/PROPH/DIAG IV INF INIT: CPT

## 2023-11-18 PROCEDURE — 36415 COLL VENOUS BLD VENIPUNCTURE: CPT | Performed by: EMERGENCY MEDICINE

## 2023-11-18 PROCEDURE — 96366 THER/PROPH/DIAG IV INF ADDON: CPT

## 2023-11-18 PROCEDURE — 99223 1ST HOSP IP/OBS HIGH 75: CPT | Performed by: STUDENT IN AN ORGANIZED HEALTH CARE EDUCATION/TRAINING PROGRAM

## 2023-11-18 PROCEDURE — 84484 ASSAY OF TROPONIN QUANT: CPT | Performed by: EMERGENCY MEDICINE

## 2023-11-18 PROCEDURE — 71045 X-RAY EXAM CHEST 1 VIEW: CPT

## 2023-11-18 RX ORDER — HEPARIN SODIUM 10000 [USP'U]/100ML
3-20 INJECTION, SOLUTION INTRAVENOUS
Status: DISCONTINUED | OUTPATIENT
Start: 2023-11-18 | End: 2023-11-20

## 2023-11-18 RX ORDER — ATORVASTATIN CALCIUM 40 MG/1
40 TABLET, FILM COATED ORAL EVERY EVENING
Status: DISCONTINUED | OUTPATIENT
Start: 2023-11-18 | End: 2023-11-21 | Stop reason: HOSPADM

## 2023-11-18 RX ORDER — HEPARIN SODIUM 10000 [USP'U]/100ML
3-20 INJECTION, SOLUTION INTRAVENOUS
Status: DISCONTINUED | OUTPATIENT
Start: 2023-11-18 | End: 2023-11-18 | Stop reason: HOSPADM

## 2023-11-18 RX ORDER — PANTOPRAZOLE SODIUM 40 MG/1
40 TABLET, DELAYED RELEASE ORAL
Status: DISCONTINUED | OUTPATIENT
Start: 2023-11-19 | End: 2023-11-21 | Stop reason: HOSPADM

## 2023-11-18 RX ORDER — ASPIRIN 81 MG/1
81 TABLET, CHEWABLE ORAL DAILY
Status: DISCONTINUED | OUTPATIENT
Start: 2023-11-19 | End: 2023-11-21 | Stop reason: HOSPADM

## 2023-11-18 RX ORDER — LISINOPRIL 10 MG/1
10 TABLET ORAL ONCE
Status: COMPLETED | OUTPATIENT
Start: 2023-11-18 | End: 2023-11-18

## 2023-11-18 RX ORDER — LORATADINE 10 MG/1
10 TABLET ORAL DAILY PRN
Status: DISCONTINUED | OUTPATIENT
Start: 2023-11-18 | End: 2023-11-21 | Stop reason: HOSPADM

## 2023-11-18 RX ORDER — AMLODIPINE BESYLATE 5 MG/1
5 TABLET ORAL DAILY
Status: DISCONTINUED | OUTPATIENT
Start: 2023-11-18 | End: 2023-11-21 | Stop reason: HOSPADM

## 2023-11-18 RX ORDER — HEPARIN SODIUM 1000 [USP'U]/ML
4000 INJECTION, SOLUTION INTRAVENOUS; SUBCUTANEOUS EVERY 6 HOURS PRN
Status: DISCONTINUED | OUTPATIENT
Start: 2023-11-18 | End: 2023-11-18 | Stop reason: HOSPADM

## 2023-11-18 RX ORDER — ALPRAZOLAM 0.25 MG/1
0.25 TABLET ORAL 3 TIMES DAILY PRN
Status: DISCONTINUED | OUTPATIENT
Start: 2023-11-18 | End: 2023-11-21 | Stop reason: HOSPADM

## 2023-11-18 RX ORDER — METOPROLOL SUCCINATE 100 MG/1
100 TABLET, EXTENDED RELEASE ORAL DAILY
Status: DISCONTINUED | OUTPATIENT
Start: 2023-11-18 | End: 2023-11-21 | Stop reason: HOSPADM

## 2023-11-18 RX ORDER — ALBUTEROL SULFATE 90 UG/1
2 AEROSOL, METERED RESPIRATORY (INHALATION) EVERY 6 HOURS PRN
Status: DISCONTINUED | OUTPATIENT
Start: 2023-11-18 | End: 2023-11-21 | Stop reason: HOSPADM

## 2023-11-18 RX ORDER — OXYCODONE HYDROCHLORIDE 5 MG/1
5 TABLET ORAL EVERY 4 HOURS PRN
Status: DISCONTINUED | OUTPATIENT
Start: 2023-11-18 | End: 2023-11-21 | Stop reason: HOSPADM

## 2023-11-18 RX ORDER — HEPARIN SODIUM 1000 [USP'U]/ML
4000 INJECTION, SOLUTION INTRAVENOUS; SUBCUTANEOUS ONCE
Status: COMPLETED | OUTPATIENT
Start: 2023-11-18 | End: 2023-11-18

## 2023-11-18 RX ORDER — ONDANSETRON 2 MG/ML
1 INJECTION INTRAMUSCULAR; INTRAVENOUS ONCE
Status: COMPLETED | OUTPATIENT
Start: 2023-11-18 | End: 2023-11-18

## 2023-11-18 RX ORDER — ACETAMINOPHEN 325 MG/1
650 TABLET ORAL EVERY 6 HOURS PRN
Status: DISCONTINUED | OUTPATIENT
Start: 2023-11-18 | End: 2023-11-21 | Stop reason: HOSPADM

## 2023-11-18 RX ORDER — NITROGLYCERIN 0.4 MG/1
0.4 TABLET SUBLINGUAL
Status: DISCONTINUED | OUTPATIENT
Start: 2023-11-18 | End: 2023-11-21 | Stop reason: HOSPADM

## 2023-11-18 RX ORDER — FLUTICASONE PROPIONATE 50 MCG
1 SPRAY, SUSPENSION (ML) NASAL DAILY PRN
Status: DISCONTINUED | OUTPATIENT
Start: 2023-11-18 | End: 2023-11-21 | Stop reason: HOSPADM

## 2023-11-18 RX ORDER — HEPARIN SODIUM 1000 [USP'U]/ML
2000 INJECTION, SOLUTION INTRAVENOUS; SUBCUTANEOUS EVERY 6 HOURS PRN
Status: DISCONTINUED | OUTPATIENT
Start: 2023-11-18 | End: 2023-11-18 | Stop reason: HOSPADM

## 2023-11-18 RX ADMIN — AMLODIPINE BESYLATE 5 MG: 5 TABLET ORAL at 17:51

## 2023-11-18 RX ADMIN — OXYCODONE HYDROCHLORIDE 5 MG: 5 TABLET ORAL at 22:46

## 2023-11-18 RX ADMIN — ACETAMINOPHEN 325MG 650 MG: 325 TABLET ORAL at 22:46

## 2023-11-18 RX ADMIN — NITROGLYCERIN 0.4 MG: 0.4 TABLET SUBLINGUAL at 18:02

## 2023-11-18 RX ADMIN — METOPROLOL SUCCINATE 100 MG: 100 TABLET, EXTENDED RELEASE ORAL at 17:52

## 2023-11-18 RX ADMIN — LISINOPRIL 10 MG: 10 TABLET ORAL at 17:51

## 2023-11-18 RX ADMIN — ATORVASTATIN CALCIUM 40 MG: 40 TABLET, FILM COATED ORAL at 17:51

## 2023-11-18 RX ADMIN — MORPHINE SULFATE 2 MG: 2 INJECTION, SOLUTION INTRAMUSCULAR; INTRAVENOUS at 21:03

## 2023-11-18 RX ADMIN — HEPARIN SODIUM 11.1 UNITS/KG/HR: 10000 INJECTION, SOLUTION INTRAVENOUS at 11:26

## 2023-11-18 RX ADMIN — ALPRAZOLAM 0.25 MG: 0.25 TABLET ORAL at 22:46

## 2023-11-18 RX ADMIN — NITROGLYCERIN 0.4 MG: 0.4 TABLET SUBLINGUAL at 17:57

## 2023-11-18 RX ADMIN — HEPARIN SODIUM 4000 UNITS: 1000 INJECTION INTRAVENOUS; SUBCUTANEOUS at 11:24

## 2023-11-18 NOTE — Clinical Note
The left coronary artery was injected and visualized. Multiple views of the injected vessel were taken. Attending Attestation (For Attendings USE Only)...

## 2023-11-18 NOTE — H&P
[facility]  H&P  Name: Gunnar Walter 80 y.o. female I MRN: [id number]  Unit/Bed#: E4 -01 I Date of Admission: 11/18/2023   Date of Service: 11/18/2023 I Hospital Day: 0      Assessment/Plan   * NSTEMI (non-ST elevated myocardial infarction) [facility])  Assessment & Plan  69-year-old female presented to the [facility] ED as a result of substernal chest pain which radiated to her right arm. There was evidence of elevated troponin levels. She was transferred to our institution due to concerns for NSTEMI. Heparin drip  Lisinopril today, then hold tomorrows dose  Metoprolol  Statin  Continue Aspirin    Hypercholesteremia  Assessment & Plan  Continue statin    Atherosclerosis of native coronary artery of native heart without angina pectoris  Assessment & Plan  CAD cardiac catheterization done last April 2018 which showed 100% stenosis of her distal LAD not amenable to intervention. She was being medically managed since then    Morbid obesity due to excess calories [facility])  Assessment & Plan  Encourage lifestyle modifications    Essential hypertension  Assessment & Plan  Above goal  Continue amlodipine and metoprolol  Hold lisinopril-hctz should there be cardiac catheterization, (One time lisinopril dose today)           VTE Prophylaxis: Heparin Drip  / sequential compression device   Code Status: full code    Anticipated Length of Stay:  Patient will be admitted on an Inpatient basis with an anticipated length of stay of  > 2 midnights. Justification for Hospital Stay: heparin drip, cardiology consult, nstemi management    Chief Complaint:   chest pain    History of Present Illness:    Gunnar Walter is a 80 y.o. female who presents with chest pain. Patient is a 69-year-old female with history of hypertension, hyperlipidemia, GERD, and CAD status post cardiac catheterization last April 2018 showing 100% distal LAD not amenable to intervention.       She states that she developed chest pain last Thursday which somehow resolved spontaneously. She woke up early today and started developing a substernal like chest pain. She said that it radiated to her right arm, but could not clearly say if this was associated with exertion or relieved with rest.  She described the chest pressure as if she ate something really big that got stuck in her throat. She presented to the [facility] where blood work showed elevated troponin levels. Cardiology evaluated her and requested transfer to our institution for possible cardiac catheterization and NSTEMI management. Patient is currently chest pain-free. Review of Systems:    Review of Systems   Constitutional:  Negative for chills and fever. HENT:  Negative for congestion. Respiratory:  Negative for cough, shortness of breath and wheezing. Cardiovascular:  Positive for chest pain. Negative for palpitations. Gastrointestinal:  Negative for abdominal pain, diarrhea and nausea. Genitourinary:  Negative for dysuria. Musculoskeletal:  Negative for gait problem. Skin:  Negative for color change and pallor. Neurological:  Positive for headaches. Psychiatric/Behavioral:  Negative for confusion. Past Medical and Surgical History:     Past Medical History:   Diagnosis Date    Anxiety     Coronary artery disease     GERD (gastroesophageal reflux disease)     Hypertension     MI, old     Migraine        Past Surgical History:   Procedure Laterality Date    BACK SURGERY      HYSTERECTOMY      LUMBAR DISCECTOMY         Meds/Allergies:    Prior to Admission medications    Medication Sig Start Date End Date Taking?  Authorizing Provider   albuterol (PROVENTIL HFA,VENTOLIN HFA) 90 mcg/act inhaler Inhale 2 puffs as needed 1/25/18  Yes Historical Provider, MD   amLODIPine (NORVASC) 5 mg tablet Take 1 tablet (5 mg total) by mouth daily 4/7/18  Yes Marco Carias MD   aspirin 81 mg chewable tablet Chew 1 tablet (81 mg total) daily  Patient taking differently: Chew 325 mg daily 4/7/18  Yes Breann Flores MD   atorvastatin (LIPITOR) 40 mg tablet Take 1 tablet (40 mg total) by mouth every evening 4/7/18  Yes Breann Flores MD   cetirizine (ZYRTEC ALLERGY) 10 mg tablet Take 1 tablet by mouth daily as needed   Yes Historical Provider, MD   fluticasone (FLONASE) 50 mcg/act nasal spray 1 spray into each nostril daily   Yes Historical Provider, MD   lisinopril-hydrochlorothiazide (PRINZIDE,ZESTORETIC) 10-12.5 MG per tablet Take 1 tablet by mouth daily 9/26/19  Yes Historical Provider, MD   metoprolol succinate (TOPROL-XL) 100 mg 24 hr tablet Take 100 mg by mouth daily   3/24/18  Yes Historical Provider, MD   nitroglycerin (NITROSTAT) 0.4 mg SL tablet Place 1 tablet (0.4 mg total) under the tongue every 5 (five) minutes as needed for chest pain 4/14/21  Yes Fatemeh Gallo MD   NON FORMULARY    Yes Historical Provider, MD   omeprazole (PRILOSEC OTC) 20 MG tablet Take 1 tablet by mouth daily   Yes Historical Provider, MD   ALPRAZolam Jerl Frieze) 0.25 mg tablet Take 1 tablet (0.25 mg total) by mouth 3 (three) times a day as needed for anxiety for up to 10 days 1/13/20 4/14/21  Maggie Nicole,    b complex vitamins tablet Take 1 tablet by mouth daily    Historical Provider, MD   co-enzyme Q-10 30 MG capsule Take 100 mg by mouth 3 (three) times a day     Historical Provider, MD   cyanocobalamin (VITAMIN B-12) 100 mcg tablet Take by mouth daily    Historical Provider, MD   DULoxetine (CYMBALTA) 20 mg capsule 80 mg daily   Patient not taking: Reported on 11/18/2023 3/24/18   Historical Provider, MD   metoclopramide (REGLAN) 5 mg tablet TAKE 1 TABLET AT ONSET OF HEADACHE TWICE A DAY ORALLY FOR 14 DAYS  Patient not taking: Reported on 11/18/2023 9/26/19   Historical Provider, MD     I have reviewed home medications with patient personally.     Allergies: No Known Allergies    Social History:     Marital Status: /Civil [place]   Substance Use History:   Social History     Substance and Sexual Activity   Alcohol Use Yes    Comment: socially     Social History     Tobacco Use   Smoking Status Never   Smokeless Tobacco Never     Social History     Substance and Sexual Activity   Drug Use Yes    Types: Marijuana    Comment: medical - anxiety-  drops       Family History:    History reviewed. No pertinent family history. Physical Exam:     Vitals:   Blood Pressure: (!) 136/103 (11/18/23 1602)  Pulse: 93 (11/18/23 1602)  Temperature: 98.6 °F (37 °C) (11/18/23 1552)  Temp Source: Temporal (11/18/23 1552)  Respirations: 22 (11/18/23 1552)  SpO2: 94 % (11/18/23 1552)    Physical Exam  Vitals reviewed. Constitutional:       General: She is not in acute distress. HENT:      Head: Normocephalic. Nose: Nose normal.      Mouth/Throat:      Mouth: Mucous membranes are moist.   Eyes:      General: No scleral icterus. Cardiovascular:      Rate and Rhythm: Normal rate and regular rhythm. Pulmonary:      Effort: Pulmonary effort is normal. No respiratory distress. Breath sounds: No wheezing or rales. Abdominal:      General: There is no distension. Palpations: Abdomen is soft. Tenderness: There is no abdominal tenderness. Musculoskeletal:      Right lower leg: No edema. Left lower leg: No edema. Skin:     General: Skin is warm. Neurological:      Mental Status: She is alert and oriented to person, place, and time. Psychiatric:         Mood and Affect: Mood normal.         Behavior: Behavior normal.       Additional Data:     Lab Results: I have personally reviewed pertinent reports.       Results from last 7 days   Lab Units 11/18/23  0947   WBC Thousand/uL 10.01   HEMOGLOBIN g/dL 14.5   HEMATOCRIT % 43.8   PLATELETS Thousands/uL 262   NEUTROS PCT % 79*   LYMPHS PCT % 11*   MONOS PCT % 7   EOS PCT % 2     Results from last 7 days   Lab Units 11/18/23  0947   SODIUM mmol/L 140   POTASSIUM mmol/L 3.5   CHLORIDE mmol/L 102   CO2 mmol/L 28   BUN mg/dL 18   CREATININE mg/dL 0.64   ANION GAP mmol/L 10   CALCIUM mg/dL 9.3   ALBUMIN g/dL 4.2   TOTAL BILIRUBIN mg/dL 0.78   ALK PHOS U/L 92   ALT U/L 10   AST U/L 21   GLUCOSE RANDOM mg/dL 152*     Results from last 7 days   Lab Units 11/18/23  0947   INR  1.04                   Imaging: I have personally reviewed pertinent reports. No orders to display       EKG, Pathology, and Other Studies Reviewed on Admission:   EKG: NSR, no acute ischemic changes, poor r wave progression, no evidence of stemi    Allscripts / Epic Records Reviewed: Yes     ** Please Note: This note has been constructed using a voice recognition system.  **

## 2023-11-18 NOTE — ASSESSMENT & PLAN NOTE
CAD cardiac catheterization done last April 2018 which showed 100% stenosis of her distal LAD not amenable to intervention.   She was being medically managed since then

## 2023-11-18 NOTE — ASSESSMENT & PLAN NOTE
21-year-old female presented to the [facility] ED as a result of substernal chest pain which radiated to her right arm. There was evidence of elevated troponin levels. She was transferred to our institution due to concerns for NSTEMI.   Heparin drip  Lisinopril today, then hold tomorrows dose  Metoprolol  Statin  Continue Aspirin

## 2023-11-18 NOTE — ED PROVIDER NOTES
History  Chief Complaint   Patient presents with    Chest Pain     Pt reports sudden onset of chest pain in her throat area. Pt states when she had the chest pain she got sweaty and shaky. Pt had 3 of nitro, zofran and aspirin per ems. Pt denies chest pain at this time. 43-year-old female with history of hypertension prior MI complains of retrosternal chest pain associated with achiness of the right forearm and hand, lightheadedness and diaphoresis for proxy 1 hour today. Took 2 nitroglycerin at home without relief. After 3 nitroglycerin by ambulance and aspirin and she is pain-free. Had similar symptoms 2 days ago. Otherwise these are new for her. Denies recent cough, GERD symptoms, fever, vomiting and diarrhea. Prior to Admission Medications   Prescriptions Last Dose Informant Patient Reported? Taking?    ALPRAZolam (XANAX) 0.25 mg tablet   No No   Sig: Take 1 tablet (0.25 mg total) by mouth 3 (three) times a day as needed for anxiety for up to 10 days   DULoxetine (CYMBALTA) 20 mg capsule 2023 Self Yes Yes   Si mg daily    Patient not taking: Reported on 2023   NON FORMULARY 2023  Yes Yes   albuterol (PROVENTIL HFA,VENTOLIN HFA) 90 mcg/act inhaler 2023 Self Yes Yes   Sig: Inhale 2 puffs as needed   amLODIPine (NORVASC) 5 mg tablet 2023 Self No Yes   Sig: Take 1 tablet (5 mg total) by mouth daily   aspirin 81 mg chewable tablet 2023 Self No Yes   Sig: Chew 1 tablet (81 mg total) daily   Patient taking differently: Chew 325 mg daily   atorvastatin (LIPITOR) 40 mg tablet 2023 Self No Yes   Sig: Take 1 tablet (40 mg total) by mouth every evening   b complex vitamins tablet 2023  Yes Yes   Sig: Take 1 tablet by mouth daily   cetirizine (ZYRTEC ALLERGY) 10 mg tablet 2023 Self Yes Yes   Sig: Take 1 tablet by mouth daily as needed   co-enzyme Q-10 30 MG capsule 2023 Self Yes Yes   Sig: Take 100 mg by mouth 3 (three) times a day cyanocobalamin (VITAMIN B-12) 100 mcg tablet 2023 Self Yes Yes   Sig: Take by mouth daily   fluticasone (FLONASE) 50 mcg/act nasal spray 2023 Self Yes Yes   Si spray into each nostril daily   lisinopril-hydrochlorothiazide (PRINZIDE,ZESTORETIC) 10-12.5 MG per tablet 2023  Yes Yes   Sig: Take 1 tablet by mouth daily   metoclopramide (REGLAN) 5 mg tablet 2023  Yes Yes   Sig: TAKE 1 TABLET AT ONSET OF HEADACHE TWICE A DAY ORALLY FOR 14 DAYS   Patient not taking: Reported on 2023   metoprolol succinate (TOPROL-XL) 100 mg 24 hr tablet 2023 Self Yes Yes   Sig: Take 100 mg by mouth daily     nitroglycerin (NITROSTAT) 0.4 mg SL tablet 2023  No Yes   Sig: Place 1 tablet (0.4 mg total) under the tongue every 5 (five) minutes as needed for chest pain   omeprazole (PRILOSEC OTC) 20 MG tablet 2023 Self Yes Yes   Sig: Take 1 tablet by mouth daily      Facility-Administered Medications: None       Past Medical History:   Diagnosis Date    Anxiety     Coronary artery disease     GERD (gastroesophageal reflux disease)     Hypertension     MI, old     Migraine        Past Surgical History:   Procedure Laterality Date    BACK SURGERY      HYSTERECTOMY      LUMBAR DISCECTOMY         History reviewed. No pertinent family history. I have reviewed and agree with the history as documented. E-Cigarette/Vaping    E-Cigarette Use Never User      E-Cigarette/Vaping Substances     Social History     Tobacco Use    Smoking status: Never    Smokeless tobacco: Never   Vaping Use    Vaping Use: Never used   Substance Use Topics    Alcohol use: Yes     Comment: socially    Drug use: Yes     Types: Marijuana     Comment: medical - anxiety-  drops       Review of Systems   Constitutional:  Negative for fatigue and fever. HENT:  Negative for congestion and sore throat. Eyes:  Negative for visual disturbance. Respiratory:  Positive for chest tightness and shortness of breath.  Negative for cough. Cardiovascular:  Positive for chest pain. Negative for palpitations and leg swelling. Gastrointestinal:  Negative for abdominal pain, blood in stool, diarrhea and vomiting. Genitourinary:  Negative for dysuria and flank pain. Musculoskeletal:  Negative for back pain. Neurological:  Negative for syncope and headaches. Physical Exam  Physical Exam  Vitals and nursing note reviewed. Constitutional:       General: She is not in acute distress. Appearance: She is well-developed. She is obese. She is not ill-appearing or diaphoretic. HENT:      Head: Normocephalic and atraumatic. Eyes:      Conjunctiva/sclera: Conjunctivae normal.      Pupils: Pupils are equal, round, and reactive to light. Neck:      Vascular: No JVD. Cardiovascular:      Rate and Rhythm: Normal rate and regular rhythm. Pulses:           Radial pulses are 3+ on the right side and 3+ on the left side. Heart sounds: Normal heart sounds. No murmur heard. Pulmonary:      Effort: Pulmonary effort is normal.      Breath sounds: Normal breath sounds. Chest:      Chest wall: No tenderness. Abdominal:      General: Bowel sounds are normal.      Palpations: Abdomen is soft. There is no fluid wave or mass. Tenderness: There is no abdominal tenderness. Musculoskeletal:         General: Normal range of motion. Cervical back: Normal range of motion and neck supple. Right lower leg: No tenderness. No edema. Left lower leg: No tenderness. No edema. Skin:     General: Skin is warm and dry. Capillary Refill: Capillary refill takes less than 2 seconds. Findings: No rash. Neurological:      General: No focal deficit present. Mental Status: She is alert and oriented to person, place, and time. Cranial Nerves: No cranial nerve deficit. Coordination: Coordination normal.      Deep Tendon Reflexes: Reflexes are normal and symmetric.    Psychiatric:         Mood and Affect: Mood normal.         Behavior: Behavior normal.         Vital Signs  ED Triage Vitals   Temperature Pulse Respirations Blood Pressure SpO2   11/18/23 0935 11/18/23 0921 11/18/23 0921 11/18/23 0921 11/18/23 0921   98.2 °F (36.8 °C) 73 18 122/66 93 %      Temp src Heart Rate Source Patient Position - Orthostatic VS BP Location FiO2 (%)   -- 11/18/23 1200 11/18/23 1200 11/18/23 1200 --    Monitor Sitting Right arm       Pain Score       --                  Vitals:    11/18/23 1230 11/18/23 1300 11/18/23 1330 11/18/23 1500   BP: 135/77 153/76 140/73 132/88   Pulse: 74 69 69    Patient Position - Orthostatic VS:  Sitting Lying          Visual Acuity      ED Medications  Medications   ondansetron (FOR EMS ONLY) (ZOFRAN) 4 mg/2 mL injection 4 mg (0 mg Does not apply Given to EMS 11/18/23 0935)   heparin (porcine) injection 4,000 Units (4,000 Units Intravenous Given 11/18/23 1124)       Diagnostic Studies  Results Reviewed       Procedure Component Value Units Date/Time    APTT six (6) hours after Heparin bolus/drip initiation or dosing change [[id number]]     Lab Status: No result Specimen: Blood     HS Troponin I 4hr [[id number]]  (Abnormal) Collected: 11/18/23 1358    Lab Status: Final result Specimen: Blood from Arm, Right Updated: 11/18/23 1431     hs TnI 4hr 1,504 ng/L      Delta 4hr hsTnI 43 ng/L     HS Troponin I 2hr [[id number]]  (Abnormal) Collected: 11/18/23 1216    Lab Status: Final result Specimen: Blood from Arm, Right Updated: 11/18/23 1250     hs TnI 2hr 1,643 ng/L      Delta 2hr hsTnI 182 ng/L     HS Troponin 0hr (reflex protocol) [[id number]]  (Abnormal) Collected: 11/18/23 0947    Lab Status: Final result Specimen: Blood from Arm, Right Updated: 11/18/23 1017     hs TnI 0hr 1,461 ng/L     Comprehensive metabolic panel [[id number]]  (Abnormal) Collected: 11/18/23 0947    Lab Status: Final result Specimen: Blood from Arm, Right Updated: 11/18/23 1011     Sodium 140 mmol/L      Potassium 3.5 mmol/L      Chloride 102 mmol/L      CO2 28 mmol/L      ANION GAP 10 mmol/L      BUN 18 mg/dL      Creatinine 0.64 mg/dL      Glucose 152 mg/dL      Calcium 9.3 mg/dL      AST 21 U/L      ALT 10 U/L      Alkaline Phosphatase 92 U/L      Total Protein 7.2 g/dL      Albumin 4.2 g/dL      Total Bilirubin 0.78 mg/dL      eGFR 92 ml/min/1.73sq m     Narrative:      National Kidney Disease Foundation guidelines for Chronic Kidney Disease (CKD):     Stage 1 with normal or high GFR (GFR > 90 mL/min/1.73 square meters)    Stage 2 Mild CKD (GFR = 60-89 mL/min/1.73 square meters)    Stage 3A Moderate CKD (GFR = 45-59 mL/min/1.73 square meters)    Stage 3B Moderate CKD (GFR = 30-44 mL/min/1.73 square meters)    Stage 4 Severe CKD (GFR = 15-29 mL/min/1.73 square meters)    Stage 5 End Stage CKD (GFR <15 mL/min/1.73 square meters)  Note: GFR calculation is accurate only with a steady state creatinine    Lipase [[id number]]  (Abnormal) Collected: 11/18/23 0947    Lab Status: Final result Specimen: Blood from Arm, Right Updated: 11/18/23 1011     Lipase <6 u/L     Protime-INR [[id number]]  (Normal) Collected: 11/18/23 0947    Lab Status: Final result Specimen: Blood from Arm, Right Updated: 11/18/23 1009     Protime 14.0 seconds      INR 1.04    APTT [[id number]]  (Normal) Collected: 11/18/23 0947    Lab Status: Final result Specimen: Blood from Arm, Right Updated: 11/18/23 1009     PTT 27 seconds     CBC and differential [[id number]]  (Abnormal) Collected: 11/18/23 0947    Lab Status: Final result Specimen: Blood from Arm, Right Updated: 11/18/23 0955     WBC 10.01 Thousand/uL      RBC 5.05 Million/uL      Hemoglobin 14.5 g/dL      Hematocrit 43.8 %      MCV 87 fL      MCH 28.7 pg      MCHC 33.1 g/dL      RDW 12.9 %      MPV 10.5 fL      Platelets 606 Thousands/uL      nRBC 0 /100 WBCs      Neutrophils Relative 79 %      Immat GRANS % 1 %      Lymphocytes Relative 11 %      Monocytes Relative 7 %      Eosinophils Relative 2 %      Basophils Relative 0 % Neutrophils Absolute 8.01 Thousands/µL      Immature Grans Absolute 0.07 Thousand/uL      Lymphocytes Absolute 1.05 Thousands/µL      Monocytes Absolute 0.69 Thousand/µL      Eosinophils Absolute 0.15 Thousand/µL      Basophils Absolute 0.04 Thousands/µL                    XR chest 1 view portable   ED Interpretation by Rachel Tran DO (11/18 1626)   No acute cardiopulmonary disease                 Procedures  ECG 12 Lead Documentation Only    Date/Time: 11/18/2023 9:48 AM    Performed by: Rachel Tran DO  Authorized by: Rachel Tran DO    ECG reviewed by me, the ED Provider: yes    Patient location:  ED  Previous ECG:     Previous ECG:  Compared to current    Comparison ECG info:  T wave inversions of anterolateral leads are worse. Similarity:  Changes noted    Comparison to cardiac monitor: Yes    Rhythm:     Rhythm: sinus rhythm    Ectopy:     Ectopy: none    QRS:     QRS axis:  Right    QRS intervals:  Normal  Other findings:     Other findings: poor R wave progression             ED Course  ED Course as of 11/18/23 1626   Sat Nov 18, 2023   1049 Patient josh asymptomatic. Had 1 low blood pressure reading of 94 systolic but is back to 507 now. Troponin noted to be high. Texted on-call cardiology regarding patient staying here versus transfer to higher level care. 1226 Patient remains asymptomatic. Transfer center states they are waiting to hear back from hospitalist at [facility] regarding transfer there. Heparin infusing. Vital signs stable. Medical Decision Making  52-year-old female with symptoms of unstable angina and known coronary artery disease. EKG does show worsening of anterolateral T wave inversions. Patient required nitroglycerin to relieve her symptoms. Exam is otherwise unremarkable. Will monitor closely. Await labs including troponin. Patient with non-ST elevation MI. Discussed with cardiology.   Patient remains stable here. We will attempt to transfer to cardiology group at [facility]. Patient excepted to [facility]. Patient remained stable here. Heparin infusing. Amount and/or Complexity of Data Reviewed  Independent Historian: abdirahman  External Data Reviewed: ECG and notes. Labs: ordered. Radiology: ordered and independent interpretation performed. ECG/medicine tests: ordered and independent interpretation performed. Decision-making details documented in ED Course. Risk  Prescription drug management. Disposition  Final diagnoses:   NSTEMI (non-ST elevated myocardial infarction) [facility])     Time reflects when diagnosis was documented in both MDM as applicable and the Disposition within this note       Time User Action Codes Description Comment    11/18/2023  1:03 PM Gab Sánchez [I21.4] NSTEMI (non-ST elevated myocardial infarction) [facility])           ED Disposition       ED Disposition   Transfer to Another Facility-In Network    Condition   --    Date/Time   Sat Nov 18, 2023  1:03 PM    Comment   Magdi Mendez should be transferred out to [facility].                MD Documentation      Raleigh Gan Most Recent Value   Patient Condition The patient has been stabilized such that within reasonable medical probability, no material deterioration of the patient condition or the condition of the unborn child(lb) is likely to result from the transfer   Reason for Transfer Level of Care needed not available at this facility   Benefits of Transfer Specialized equipment and/or services available at the receiving facility (Include comment)________________________  Bryanna Huizar cardiology/ cath lab]   Risks of Transfer Potential for delay in receiving treatment, Potential deterioration of medical condition, Loss of IV, Increased discomfort during transfer, Possible worsening of condition or death during transfer   Accepting Physician Tonya Jacobs Facility Name, [address]    (Name & Tel number) PACS   Transported by (Company and Unit #) Alcon Payan MD [facility]   Provider Certification General risk, such as traffic hazards, adverse weather conditions, rough terrain or turbulence, possible failure of equipment (including vehicle or aircraft), or consequences of actions of persons outside the control of the transport personnel, Unanticipated needs of medical equipment and personnel during transport, Risk of worsening condition          RN Documentation      [address] Name, [address]    (Name & Tel number) PACS   Transported by (Company and Unit #) [employer]          Follow-up Information    None         Discharge Medication List as of 11/18/2023  3:09 PM        CONTINUE these medications which have NOT CHANGED    Details   albuterol (PROVENTIL HFA,VENTOLIN HFA) 90 mcg/act inhaler Inhale 2 puffs as needed, Starting Thu 1/25/2018, Historical Med      amLODIPine (NORVASC) 5 mg tablet Take 1 tablet (5 mg total) by mouth daily, Starting Sat 4/7/2018, Normal      aspirin 81 mg chewable tablet Chew 1 tablet (81 mg total) daily, Starting Sat 4/7/2018, Normal      atorvastatin (LIPITOR) 40 mg tablet Take 1 tablet (40 mg total) by mouth every evening, Starting Sat 4/7/2018, Normal      b complex vitamins tablet Take 1 tablet by mouth daily, Historical Med      cetirizine (ZYRTEC ALLERGY) 10 mg tablet Take 1 tablet by mouth daily as needed, Historical Med      co-enzyme Q-10 30 MG capsule Take 100 mg by mouth 3 (three) times a day , Historical Med      cyanocobalamin (VITAMIN B-12) 100 mcg tablet Take by mouth daily, Historical Med      DULoxetine (CYMBALTA) 20 mg capsule 80 mg daily , Starting Sat 3/24/2018, Historical Med      fluticasone (FLONASE) 50 mcg/act nasal spray 1 spray into each nostril daily, Historical Med      lisinopril-hydrochlorothiazide (PRINZIDE,ZESTORETIC) 10-12.5 MG per tablet Take 1 tablet by mouth daily, Starting Thu 9/26/2019, Historical Med      metoclopramide (REGLAN) 5 mg tablet TAKE 1 TABLET AT ONSET OF HEADACHE TWICE A DAY ORALLY FOR 14 DAYS, Historical Med      metoprolol succinate (TOPROL-XL) 100 mg 24 hr tablet Take 100 mg by mouth daily  , Starting Sat 3/24/2018, Historical Med      nitroglycerin (NITROSTAT) 0.4 mg SL tablet Place 1 tablet (0.4 mg total) under the tongue every 5 (five) minutes as needed for chest pain, Starting Wed 4/14/2021, Normal      NON FORMULARY Historical Med      omeprazole (PRILOSEC OTC) 20 MG tablet Take 1 tablet by mouth daily, Historical Med      ALPRAZolam (XANAX) 0.25 mg tablet Take 1 tablet (0.25 mg total) by mouth 3 (three) times a day as needed for anxiety for up to 10 days, Starting Mon 1/13/2020, Until Wed 4/14/2021, Normal             No discharge procedures on file.     PDMP Review       None            ED Provider  Electronically Signed by             Ivy Peralta DO  11/18/23 1407

## 2023-11-18 NOTE — ASSESSMENT & PLAN NOTE
Above goal  Continue amlodipine and metoprolol  Hold lisinopril-hctz should there be cardiac catheterization, (One time lisinopril dose today)

## 2023-11-18 NOTE — EMTALA/ACUTE CARE TRANSFER
[facility] EMERGENCY DEPARTMENT  [address] [place] [address]  Dept: [phone]      EMTALA TRANSFER CONSENT    NAME Burgess Saldivar                                         1955                              MRN [id number]    I have been informed of my rights regarding examination, treatment, and transfer   by Dr. Annie Cruz DO    Benefits: Specialized equipment and/or services available at the receiving facility (Include comment)________________________ (invasive cardiology/ cath lab)    Risks: Potential for delay in receiving treatment, Potential deterioration of medical condition, Loss of IV, Increased discomfort during transfer, Possible worsening of condition or death during transfer      Consent for Transfer:  I acknowledge that my medical condition has been evaluated and explained to me by the emergency department physician or other qualified medical person and/or my attending physician, who has recommended that I be transferred to the service of  Accepting Physician: Tonya at [address] [place] [address] [address] Name, [facility] : [facility]. The above potential benefits of such transfer, the potential risks associated with such transfer, and the probable risks of not being transferred have been explained to me, and I fully understand them. The doctor has explained that, in my case, the benefits of transfer outweigh the risks. I agree to be transferred. I authorize the performance of emergency medical procedures and treatments upon me in both transit and upon arrival at the receiving facility. Additionally, I authorize the release of any and all medical records to the receiving facility and request they be transported with me, if possible. I understand that the safest mode of transportation during a medical emergency is an ambulance and that the Hospital advocates the use of this mode of transport.  Risks of traveling to the receiving facility by car, including absence of medical control, life sustaining equipment, such as oxygen, and medical personnel has been explained to me and I fully understand them. (MARY CORRECT BOX BELOW)  [X]  I consent to the stated transfer and to be transported by ambulance/helicopter. [  ]  I consent to the stated transfer, but refuse transportation by ambulance and accept full responsibility for my transportation by car. I understand the risks of non-ambulance transfers and I exonerate the Hospital and its staff from any deterioration in my condition that results from this refusal.    X___________________________________________    DATE  23  TIME________  Signature of patient or legally responsible individual signing on patient behalf           RELATIONSHIP TO PATIENT_________________________          Provider Certification    NAME Bettie Stern                                         1955                              MRN [id number]    A medical screening exam was performed on the above named patient. Based on the examination:    Condition Necessitating Transfer The encounter diagnosis was NSTEMI (non-ST elevated myocardial infarction) ([address]).     Patient Condition: The patient has been stabilized such that within reasonable medical probability, no material deterioration of the patient condition or the condition of the unborn child(lb) is likely to result from the transfer    Reason for Transfer: Level of Care needed not available at this facility    Transfer Requirements: [address] available and qualified personnel available for treatment as acknowledged by PACS  Agreed to accept transfer and to provide appropriate medical treatment as acknowledged by       Tonya  Appropriate medical records of the examination and treatment of the patient are provided at the time of transfer   [address] [facility] _______  Transfer will be performed by qualified personnel from [facility]  and appropriate transfer equipment as required, including the use of necessary and appropriate life support measures. Provider Certification: I have examined the patient and explained the following risks and benefits of being transferred/refusing transfer to the patient/family:  General risk, such as traffic hazards, adverse weather conditions, rough terrain or turbulence, possible failure of equipment (including vehicle or aircraft), or consequences of actions of persons outside the control of the transport personnel, Unanticipated needs of medical equipment and personnel during transport, Risk of worsening condition      Based on these reasonable risks and benefits to the patient and/or the unborn child(lb), and based upon the information available at the time of the patient’s examination, I certify that the medical benefits reasonably to be expected from the provision of appropriate medical treatments at another medical facility outweigh the increasing risks, if any, to the individual’s medical condition, and in the case of labor to the unborn child, from effecting the transfer.     X____________________________________________ DATE 11/18/23        TIME_______      ORIGINAL - SEND TO MEDICAL RECORDS   COPY - SEND WITH PATIENT DURING TRANSFER

## 2023-11-18 NOTE — Clinical Note
The site was marked. Prepped: right groin and right radial. Prepped with: ChloraPrep. The site was clipped.

## 2023-11-19 ENCOUNTER — PREP FOR PROCEDURE (OUTPATIENT)
Dept: NON INVASIVE DIAGNOSTICS | Facility: HOSPITAL | Age: 68
End: 2023-11-19

## 2023-11-19 DIAGNOSIS — I21.4 NSTEMI (NON-ST ELEVATED MYOCARDIAL INFARCTION) (HCC): Primary | ICD-10-CM

## 2023-11-19 LAB
4HR DELTA HS TROPONIN: 1058 NG/L
APTT PPP: 42 SECONDS (ref 23–37)
APTT PPP: 61 SECONDS (ref 23–37)
APTT PPP: 64 SECONDS (ref 23–37)
ATRIAL RATE: 72 BPM
CARDIAC TROPONIN I PNL SERPL HS: 2415 NG/L
P AXIS: 40 DEGREES
PR INTERVAL: 136 MS
QRS AXIS: 150 DEGREES
QRSD INTERVAL: 82 MS
QT INTERVAL: 398 MS
QTC INTERVAL: 435 MS
T WAVE AXIS: 101 DEGREES
VENTRICULAR RATE: 72 BPM

## 2023-11-19 PROCEDURE — 85730 THROMBOPLASTIN TIME PARTIAL: CPT | Performed by: STUDENT IN AN ORGANIZED HEALTH CARE EDUCATION/TRAINING PROGRAM

## 2023-11-19 PROCEDURE — 85730 THROMBOPLASTIN TIME PARTIAL: CPT | Performed by: PHYSICIAN ASSISTANT

## 2023-11-19 PROCEDURE — 93010 ELECTROCARDIOGRAM REPORT: CPT | Performed by: INTERNAL MEDICINE

## 2023-11-19 PROCEDURE — 99223 1ST HOSP IP/OBS HIGH 75: CPT | Performed by: INTERNAL MEDICINE

## 2023-11-19 PROCEDURE — 84484 ASSAY OF TROPONIN QUANT: CPT | Performed by: STUDENT IN AN ORGANIZED HEALTH CARE EDUCATION/TRAINING PROGRAM

## 2023-11-19 PROCEDURE — 99232 SBSQ HOSP IP/OBS MODERATE 35: CPT | Performed by: STUDENT IN AN ORGANIZED HEALTH CARE EDUCATION/TRAINING PROGRAM

## 2023-11-19 RX ORDER — ONDANSETRON 2 MG/ML
4 INJECTION INTRAMUSCULAR; INTRAVENOUS EVERY 6 HOURS PRN
Status: DISCONTINUED | OUTPATIENT
Start: 2023-11-19 | End: 2023-11-21 | Stop reason: HOSPADM

## 2023-11-19 RX ADMIN — NITROGLYCERIN 1 INCH: 20 OINTMENT TOPICAL at 12:12

## 2023-11-19 RX ADMIN — AMLODIPINE BESYLATE 5 MG: 5 TABLET ORAL at 08:44

## 2023-11-19 RX ADMIN — ASPIRIN 81 MG CHEWABLE TABLET 81 MG: 81 TABLET CHEWABLE at 08:44

## 2023-11-19 RX ADMIN — PANTOPRAZOLE SODIUM 40 MG: 40 TABLET, DELAYED RELEASE ORAL at 05:10

## 2023-11-19 RX ADMIN — ONDANSETRON 4 MG: 2 INJECTION INTRAMUSCULAR; INTRAVENOUS at 19:40

## 2023-11-19 RX ADMIN — ACETAMINOPHEN 325MG 650 MG: 325 TABLET ORAL at 12:16

## 2023-11-19 RX ADMIN — ALPRAZOLAM 0.25 MG: 0.25 TABLET ORAL at 17:04

## 2023-11-19 RX ADMIN — ATORVASTATIN CALCIUM 40 MG: 40 TABLET, FILM COATED ORAL at 17:04

## 2023-11-19 RX ADMIN — HEPARIN SODIUM 19.1 UNITS/KG/HR: 10000 INJECTION, SOLUTION INTRAVENOUS at 19:41

## 2023-11-19 RX ADMIN — OXYCODONE HYDROCHLORIDE 5 MG: 5 TABLET ORAL at 17:04

## 2023-11-19 RX ADMIN — HEPARIN SODIUM 19.1 UNITS/KG/HR: 10000 INJECTION, SOLUTION INTRAVENOUS at 05:29

## 2023-11-19 RX ADMIN — METOPROLOL SUCCINATE 100 MG: 100 TABLET, EXTENDED RELEASE ORAL at 08:44

## 2023-11-19 NOTE — QUICK NOTE
Orders place via Prep for Procedure for [facility] and message left on [facility] scheduling line.

## 2023-11-19 NOTE — PROGRESS NOTES
[facility]  Progress Note  Name: Doretha Luciano I  MRN: [id number]  Unit/Bed#: E4 -01 I Date of Admission: 11/18/2023   Date of Service: 11/19/2023 I Hospital Day: 1    Assessment/Plan   * NSTEMI (non-ST elevated myocardial infarction) [facility])  Assessment & Plan  56-year-old female presented to the [address] [place] [address] ED as a result of substernal chest pain which radiated to her right arm. There was evidence of elevated troponin levels. She was transferred to our institution due to concerns for Type I NSTEMI. Continue heparin drip  Lisinopril on hold  Continue Metoprolol, statin, aspirin  Cardiac catheterization planned for tomorrow  Results from last 7 days   Lab Units 11/19/23  0009 11/18/23  2236 11/18/23  1954   HS TNI 0HR ng/L  --   --  1,357*   HS TNI 2HR ng/L  --  1,703*  --    HS TNI 4HR ng/L 2,415*  --   --          Hypercholesteremia  Assessment & Plan  Continue statin    Atherosclerosis of native coronary artery of native heart without angina pectoris  Assessment & Plan  CAD cardiac catheterization done last April 2018 which showed 100% stenosis of her distal LAD not amenable to intervention.   She was being medically managed since then    Morbid obesity due to excess calories [facility])  Assessment & Plan  Encourage lifestyle modifications    Essential hypertension  Assessment & Plan  Above goal  Continue amlodipine and metoprolol  Hold lisinopril-hctz should there be cardiac catheterization           VTE Pharmacologic Prophylaxis:   Pharmacologic: Heparin Drip  Mechanical VTE Prophylaxis in Place: Yes    Discussions with Specialists or Other Care Team Provider: nursing, cardiology    Education and Discussions with Family / Patient: patient    Current Length of Stay: 1 day(s)    Current Patient Status: Inpatient   Certification Statement: The patient will continue to require additional inpatient hospital stay due to cardiac catheterization, nstemi    Discharge Plan: active    Code Status: Level 1 - Full Code      Subjective:   Patient seen and examined at bedside. She complained of chest pain overnight. Objective:     Vitals:   Temp (24hrs), Av °F (36.7 °C), Min:97.4 °F (36.3 °C), Max:98.6 °F (37 °C)    Temp:  [97.4 °F (36.3 °C)-98.6 °F (37 °C)] 97.4 °F (36.3 °C)  HR:  [] 70  Resp:  [14-22] 18  BP: ()/() 141/79  SpO2:  [90 %-94 %] 90 %  Body mass index is 43.73 kg/m². Input and Output Summary (last 24 hours):     No intake or output data in the 24 hours ending 23 1104    Physical Exam:     Physical Exam  Vitals reviewed. Constitutional:       General: She is not in acute distress. HENT:      Head: Normocephalic. Nose: Nose normal.      Mouth/Throat:      Mouth: Mucous membranes are moist.   Eyes:      General: No scleral icterus. Cardiovascular:      Rate and Rhythm: Normal rate. Pulmonary:      Effort: Pulmonary effort is normal. No respiratory distress. Abdominal:      General: There is no distension. Palpations: Abdomen is soft. Tenderness: There is no abdominal tenderness. Skin:     General: Skin is warm. Neurological:      Mental Status: She is alert. Mental status is at baseline.    Psychiatric:         Mood and Affect: Mood normal.         Behavior: Behavior normal.       Additional Data:     Labs:    Results from last 7 days   Lab Units 23  0947   WBC Thousand/uL 10.01   HEMOGLOBIN g/dL 14.5   HEMATOCRIT % 43.8   PLATELETS Thousands/uL 262   NEUTROS PCT % 79*   LYMPHS PCT % 11*   MONOS PCT % 7   EOS PCT % 2     Results from last 7 days   Lab Units 23  0947   SODIUM mmol/L 140   POTASSIUM mmol/L 3.5   CHLORIDE mmol/L 102   CO2 mmol/L 28   BUN mg/dL 18   CREATININE mg/dL 0.64   ANION GAP mmol/L 10   CALCIUM mg/dL 9.3   ALBUMIN g/dL 4.2   TOTAL BILIRUBIN mg/dL 0.78   ALK PHOS U/L 92   ALT U/L 10   AST U/L 21   GLUCOSE RANDOM mg/dL 152*     Results from last 7 days   Lab Units 23  0947   INR  1.04 * I Have Reviewed All Lab Data Listed Above. * Additional Pertinent Lab Tests Reviewed: [address] Admission Reviewed    Mobility:  Basic Mobility Inpatient Raw Score: 24  -HLM Goal: 8: Walk 250 feet or more  JH-HLM Achieved: 8: Walk 250 feet ot more    Lines:    Invasive Devices       Peripheral Intravenous Line  Duration             Peripheral IV 11/18/23 Left Antecubital 1 day    Peripheral IV 11/18/23 Right Antecubital 1 day                       Imaging:    Imaging Reports Reviewed Today Include: xr chest    Recent Cultures (last 7 days):           Last 24 Hours Medication List:   Current Facility-Administered Medications   Medication Dose Route Frequency Provider Last Rate    acetaminophen  650 mg Oral Q6H PRN Mansi Tomas MD      albuterol  2 puff Inhalation Q6H PRN Mansi Tomas MD      ALPRAZolam  0.25 mg Oral TID PRN Mansi Tomas MD      amLODIPine  5 mg Oral Daily Mansi Tomas MD      aspirin  81 mg Oral Daily Mansi Tomas MD      atorvastatin  40 mg Oral QPM Mansi Tomas MD      fluticasone  1 spray Nasal Daily PRN Mansi Tomas MD      heparin (porcine)  3-20 Units/kg/hr (Order-Specific) Intravenous Titrated Mansi Tomas MD 19.1 Units/kg/hr (11/19/23 0529)    loratadine  10 mg Oral Daily PRN Mansi Tomas MD      metoprolol succinate  100 mg Oral Daily Mansi Tomas MD      morphine injection  2 mg Intravenous Q4H PRN Mansi Tomas MD      nitroglycerin  1 inch Topical Once Rujul Barry, DO      nitroglycerin  0.4 mg Sublingual Q5 Min PRN Mansi Tomas MD      oxyCODONE  5 mg Oral Q4H PRN Mansi Tomas MD      pantoprazole  40 mg Oral Early Morning Mansi Tomas MD          Today, Patient Was Seen By: Elida Waters MD    ** Please Note: Dictation voice to text software may have been used in the creation of this document.  **

## 2023-11-19 NOTE — ASSESSMENT & PLAN NOTE
Above goal  Continue amlodipine and metoprolol  Hold lisinopril-hctz should there be cardiac catheterization

## 2023-11-19 NOTE — CONSULTS
Consult - Cardiology   Hari Toribio 80 y.o. female MRN: [id number]  Unit/Bed#: E4 -01 Encounter: [id number]        Reason For Consult: chest pain  Outpatient Cardiologist: Dr. Fidelia Victoria:  Chest pain, elevated troponin (initial 1461, peak 2415)  EKG shows sinus rhythm with transient anterior T wave inversions new from prior and somewhat improved on serial EKGs in the emergency department  Symptoms and clinical picture concerning for ACS, cannot rule out type I NSTEMI  History of coronary artery disease  April 2018 NSTEMI  April 2018 LHC: Left main normal, distal % stenosis with dissection, circumflex normal, RCA normal  April 2018 echo: LVEF 60%, apical hypokinesis, mild mitral and aortic insufficiency  Dyslipidemia  11/2022 lipids: Cholesterol 164, triglycerides 178, HDL 46, LDL 82  Prehospital Rx Lipitor 40  Hypertension  Prehospital Rx: Amlodipine 5, Toprol- daily, lisinopril-HCTZ 10-12.5    PLAN/ DISCUSSION:     Currently hemodynamically stable although still with some residual achy discomfort in her chest she tells me this is different from the chest discomfort that brought her here in the first place. Differential in her case does include ACS or stress-induced cardiomyopathy given her history.  She also does have history of distal LAD dissection   We will check an echocardiogram today  Plan for ischemic eval likely tomorrow  Continue heparin  Continue aspirin 81 daily (received 324 in the ambulance)  Continue Lipitor  Continue amlodipine  Continue Toprol  Okay for diet today, n.p.o. after midnight tonight  Discussed with son over speaker phone while in the room    History Of Present Illness:  Ms. Destiney Barnes is a very pleasant 56-year-old who resides just outside of [facility] and had followed locally with our cardiology office up until about 2021 at which time she was lost to follow-up in the midst of office cancellations from [facility] and visit restrictions. She does have history of NSTEMI with distal LAD dissection in 2018 which was treated medically. She is done fairly well since then. She does note a lot of stress and anxiety for which she takes medicine at home and at times has developed chest pain over the last few years. She will take nitroglycerin sometimes but notes that it does not always help and she thinks her chest pain may be partially due to stress and anxiety. She does report a lot of stress and anxiety over the last year as she lost her youngest son to critical illness in the family has been going through a lot of grieving. 3 days ago on Thursday morning she awoke in her normal state of health however soon thereafter developed anterior chest discomfort. She describes this as a pressure as if you take a large sip of water and get stuck in your throat. With this she had some squeezing pain in her right forearm. This did feel different from her prior heart attack. It resolved spontaneously so she did not think much of it. Yesterday morning Saturday morning she again woke up in her normal state of health however within 5 minutes of walking downstairs her symptoms returned. They were quite severe 10/10. She told her  and they came to the hospital for evaluation. In the emergency room troponins were significantly elevated > 1000. She was given aspirin and nitroglycerin in the ambulance and she tells me that this did help her pain. She was subsequently transferred to the hospital with catheterization capabilities. Last night she had some recurrent chest discomfort in the hospital and was given sublingual nitroglycerin which helped somewhat. This morning she has low bit of achiness on the right side of her chest but otherwise has no further chest pain similar to what brought her in.     Past Medical History:        Past Medical History:   Diagnosis Date    Anxiety     Coronary artery disease     GERD (gastroesophageal reflux disease)     Hypertension     MI, old     Migraine       Past Surgical History:   Procedure Laterality Date    BACK SURGERY      HYSTERECTOMY      LUMBAR DISCECTOMY          Allergy:        No Known Allergies    Medications:       Prior to Admission medications    Medication Sig Start Date End Date Taking?  Authorizing Provider   albuterol (PROVENTIL HFA,VENTOLIN HFA) 90 mcg/act inhaler Inhale 2 puffs as needed 1/25/18  Yes Historical Provider, MD   amLODIPine (NORVASC) 5 mg tablet Take 1 tablet (5 mg total) by mouth daily 4/7/18  Yes Viktoria Chahal MD   aspirin 81 mg chewable tablet Chew 1 tablet (81 mg total) daily  Patient taking differently: Chew 325 mg daily 4/7/18  Yes Viktoria Chahal MD   atorvastatin (LIPITOR) 40 mg tablet Take 1 tablet (40 mg total) by mouth every evening 4/7/18  Yes Viktoria Chahal MD   cetirizine (ZYRTEC ALLERGY) 10 mg tablet Take 1 tablet by mouth daily as needed   Yes Historical Provider, MD   fluticasone (FLONASE) 50 mcg/act nasal spray 1 spray into each nostril daily   Yes Historical Provider, MD   lisinopril-hydrochlorothiazide (PRINZIDE,ZESTORETIC) 10-12.5 MG per tablet Take 1 tablet by mouth daily 9/26/19  Yes Historical Provider, MD   metoprolol succinate (TOPROL-XL) 100 mg 24 hr tablet Take 100 mg by mouth daily   3/24/18  Yes Historical Provider, MD   nitroglycerin (NITROSTAT) 0.4 mg SL tablet Place 1 tablet (0.4 mg total) under the tongue every 5 (five) minutes as needed for chest pain 4/14/21  Yes Helder Escobar MD   NON FORMULARY    Yes Historical Provider, MD   omeprazole (PRILOSEC OTC) 20 MG tablet Take 1 tablet by mouth daily   Yes Historical Provider, MD   ALPRAZolam Diane Marts) 0.25 mg tablet Take 1 tablet (0.25 mg total) by mouth 3 (three) times a day as needed for anxiety for up to 10 days 1/13/20 4/14/21  Maggie Nicole, DO   b complex vitamins tablet Take 1 tablet by mouth daily    Historical Provider, MD   co-enzyme Q-10 30 MG capsule Take 100 mg by mouth 3 (three) times a day     Historical Provider, MD   cyanocobalamin (VITAMIN B-12) 100 mcg tablet Take by mouth daily    Historical Provider, MD   DULoxetine (CYMBALTA) 20 mg capsule 80 mg daily   Patient not taking: Reported on 11/18/2023 3/24/18   Historical Provider, MD   metoclopramide (REGLAN) 5 mg tablet TAKE 1 TABLET AT ONSET OF HEADACHE TWICE A DAY ORALLY FOR 14 DAYS  Patient not taking: Reported on 11/18/2023 9/26/19   Historical Provider, MD       Family History:     History reviewed. No pertinent family history. Social History:       Social History     Socioeconomic History    Marital status: /Civil Union     Spouse name: None    Number of children: None    Years of education: None    Highest education level: None   Occupational History    None   Tobacco Use    Smoking status: Never    Smokeless tobacco: Never   Vaping Use    Vaping Use: Never used   Substance and Sexual Activity    Alcohol use: Yes     Comment: socially    Drug use: Yes     Types: Marijuana     Comment: medical - anxiety-  drops    Sexual activity: Yes     Birth control/protection: Female Sterilization     Comment: hysterectomy   Other Topics Concern    None   Social History Narrative    None     Social Determinants of Health     Financial Resource Strain: Not on file   Food Insecurity: Not on file   Transportation Needs: Not on file   Physical Activity: Not on file   Stress: Not on file   Social Connections: Not on file   Intimate Partner Violence: Not on file   Housing Stability: Not on file       ROS:  14 point ROS negative except as outlined above  Remainder review of systems is negative    Exam:  General:  alert, oriented and in no distress, cooperative  Head: Normocephalic, atraumatic. Eyes:  EOMI. Pupils - equal, round, reactive to accomodation. No icterus. Normal Conjunctiva.    Oropharynx: moist and normal-appearing mucosa  Neck: supple, symmetrical, trachea midline and no JVD  Heart: RRR, No: murmer, rub or gallop, S1 & S2 normal   Respiratory effort / Chest Inspection: unlabored  Lungs:  normal air entry, lungs clear to auscultation and no rales, rhonchi or wheezing   Abdomen: flat, normal findings: bowel sounds normal and soft, non-tender  Lower Limbs:  no pitting edema  Pulses[de-identified]  RLE - DP: present 2+                 LLE - DP: present 2+  Musculoskeletal: ROM grossly normal        DATA:      ECG:                     Telemetry: Normal sinus rhythm, . HR 70s          Echocardiogram:           Ischemic Testing:         Weights: Wt Readings from Last 3 Encounters:   11/19/23 119 kg (262 lb 12.6 oz)   11/18/23 125 kg (275 lb 9.2 oz)   01/13/20 123 kg (271 lb 1 oz)   , Body mass index is 43.73 kg/m².          Lab Studies:             Results from last 7 days   Lab Units 11/18/23  0947   WBC Thousand/uL 10.01   HEMOGLOBIN g/dL 14.5   HEMATOCRIT % 43.8   PLATELETS Thousands/uL 262   ,   Results from last 7 days   Lab Units 11/18/23  0947   POTASSIUM mmol/L 3.5   CHLORIDE mmol/L 102   CO2 mmol/L 28   BUN mg/dL 18   CREATININE mg/dL 0.64   CALCIUM mg/dL 9.3   ALK PHOS U/L 92   ALT U/L 10   AST U/L 21

## 2023-11-19 NOTE — ASSESSMENT & PLAN NOTE
81-year-old female presented to the [place] ED as a result of substernal chest pain which radiated to her right arm. There was evidence of elevated troponin levels. She was transferred to our institution due to concerns for Type I NSTEMI.   Continue heparin drip  Lisinopril on hold  Continue Metoprolol, statin, aspirin  Cardiac catheterization planned for tomorrow  Results from last 7 days   Lab Units 11/19/23  0009 11/18/23  2236 11/18/23  1954   HS TNI 0HR ng/L  --   --  1,357*   HS TNI 2HR ng/L  --  1,703*  --    HS TNI 4HR ng/L 2,415*  --   --

## 2023-11-20 ENCOUNTER — APPOINTMENT (INPATIENT)
Dept: NON INVASIVE DIAGNOSTICS | Facility: HOSPITAL | Age: 68
End: 2023-11-20
Payer: MEDICARE

## 2023-11-20 PROBLEM — R79.89 ELEVATED TROPONIN LEVEL NOT DUE MYOCARDIAL INFARCTION: Status: ACTIVE | Noted: 2023-11-18

## 2023-11-20 LAB
ANION GAP SERPL CALCULATED.3IONS-SCNC: 7 MMOL/L
AORTIC ROOT: 3.6 CM
AORTIC VALVE MEAN VELOCITY: 9.2 M/S
APICAL FOUR CHAMBER EJECTION FRACTION: 65 %
APTT PPP: 49 SECONDS (ref 23–37)
ATRIAL RATE: 95 BPM
AV AREA BY CONTINUOUS VTI: 1.8 CM2
AV AREA PEAK VELOCITY: 1.8 CM2
AV LVOT MEAN GRADIENT: 2 MMHG
AV LVOT PEAK GRADIENT: 3 MMHG
AV MEAN GRADIENT: 4 MMHG
AV PEAK GRADIENT: 7 MMHG
AV VALVE AREA: 1.83 CM2
AV VELOCITY RATIO: 0.65
BUN SERPL-MCNC: 14 MG/DL (ref 5–25)
CALCIUM SERPL-MCNC: 9.2 MG/DL (ref 8.4–10.2)
CHLORIDE SERPL-SCNC: 103 MMOL/L (ref 96–108)
CO2 SERPL-SCNC: 28 MMOL/L (ref 21–32)
CREAT SERPL-MCNC: 0.54 MG/DL (ref 0.6–1.3)
DOP CALC AO PEAK VEL: 1.3 M/S
DOP CALC AO VTI: 28.85 CM
DOP CALC LVOT AREA: 2.83 CM2
DOP CALC LVOT CARDIAC INDEX: 1.75 L/MIN/M2
DOP CALC LVOT CARDIAC OUTPUT: 3.9 L/MIN
DOP CALC LVOT DIAMETER: 1.9 CM
DOP CALC LVOT PEAK VEL VTI: 18.64 CM
DOP CALC LVOT PEAK VEL: 0.84 M/S
DOP CALC LVOT STROKE INDEX: 23 ML/M2
DOP CALC LVOT STROKE VOLUME: 52.82
E WAVE DECELERATION TIME: 197 MS
E/A RATIO: 0.91
ERYTHROCYTE [DISTWIDTH] IN BLOOD BY AUTOMATED COUNT: 13.1 % (ref 11.6–15.1)
FRACTIONAL SHORTENING: 35 (ref 28–44)
GFR SERPL CREATININE-BSD FRML MDRD: 97 ML/MIN/1.73SQ M
GLUCOSE SERPL-MCNC: 124 MG/DL (ref 65–140)
HCT VFR BLD AUTO: 40.5 % (ref 34.8–46.1)
HGB BLD-MCNC: 13.6 G/DL (ref 11.5–15.4)
INTERVENTRICULAR SEPTUM IN DIASTOLE (PARASTERNAL SHORT AXIS VIEW): 1.3 CM
INTERVENTRICULAR SEPTUM: 1.3 CM (ref 0.6–1.1)
LAAS-AP4: 21.4 CM2
LEFT ATRIUM AREA SYSTOLE SINGLE PLANE A4C: 21.6 CM2
LEFT ATRIUM SIZE: 3.4 CM
LEFT INTERNAL DIMENSION IN SYSTOLE: 3 CM (ref 2.1–4)
LEFT VENTRICULAR INTERNAL DIMENSION IN DIASTOLE: 4.6 CM (ref 3.5–6)
LEFT VENTRICULAR POSTERIOR WALL IN END DIASTOLE: 1.3 CM
LEFT VENTRICULAR STROKE VOLUME: 66 ML
LVSV (TEICH): 66 ML
MAGNESIUM SERPL-MCNC: 2.1 MG/DL (ref 1.9–2.7)
MCH RBC QN AUTO: 29.1 PG (ref 26.8–34.3)
MCHC RBC AUTO-ENTMCNC: 33.6 G/DL (ref 31.4–37.4)
MCV RBC AUTO: 87 FL (ref 82–98)
MV E'TISSUE VEL-LAT: 8 CM/S
MV E'TISSUE VEL-SEP: 6 CM/S
MV PEAK A VEL: 0.78 M/S
MV PEAK E VEL: 71 CM/S
MV STENOSIS PRESSURE HALF TIME: 57 MS
MV VALVE AREA P 1/2 METHOD: 3.86
P AXIS: 10 DEGREES
PLATELET # BLD AUTO: 201 THOUSANDS/UL (ref 149–390)
PMV BLD AUTO: 11.3 FL (ref 8.9–12.7)
POTASSIUM SERPL-SCNC: 3.9 MMOL/L (ref 3.5–5.3)
PR INTERVAL: 146 MS
QRS AXIS: 94 DEGREES
QRSD INTERVAL: 76 MS
QT INTERVAL: 376 MS
QTC INTERVAL: 472 MS
RBC # BLD AUTO: 4.68 MILLION/UL (ref 3.81–5.12)
RIGHT ATRIUM AREA SYSTOLE A4C: 19.3 CM2
RIGHT VENTRICLE ID DIMENSION: 4.1 CM
SL CV PED ECHO LEFT VENTRICLE DIASTOLIC VOLUME (MOD BIPLANE) 2D: 100 ML
SL CV PED ECHO LEFT VENTRICLE SYSTOLIC VOLUME (MOD BIPLANE) 2D: 34 ML
SODIUM SERPL-SCNC: 138 MMOL/L (ref 135–147)
T WAVE AXIS: 52 DEGREES
TRICUSPID ANNULAR PLANE SYSTOLIC EXCURSION: 2.2 CM
VENTRICULAR RATE: 95 BPM
WBC # BLD AUTO: 13.13 THOUSAND/UL (ref 4.31–10.16)

## 2023-11-20 PROCEDURE — 85027 COMPLETE CBC AUTOMATED: CPT | Performed by: STUDENT IN AN ORGANIZED HEALTH CARE EDUCATION/TRAINING PROGRAM

## 2023-11-20 PROCEDURE — 99232 SBSQ HOSP IP/OBS MODERATE 35: CPT | Performed by: STUDENT IN AN ORGANIZED HEALTH CARE EDUCATION/TRAINING PROGRAM

## 2023-11-20 PROCEDURE — 93306 TTE W/DOPPLER COMPLETE: CPT | Performed by: INTERNAL MEDICINE

## 2023-11-20 PROCEDURE — 80048 BASIC METABOLIC PNL TOTAL CA: CPT | Performed by: STUDENT IN AN ORGANIZED HEALTH CARE EDUCATION/TRAINING PROGRAM

## 2023-11-20 PROCEDURE — NC001 PR NO CHARGE: Performed by: INTERNAL MEDICINE

## 2023-11-20 PROCEDURE — 93010 ELECTROCARDIOGRAM REPORT: CPT | Performed by: INTERNAL MEDICINE

## 2023-11-20 PROCEDURE — C1769 GUIDE WIRE: HCPCS | Performed by: INTERNAL MEDICINE

## 2023-11-20 PROCEDURE — B2111ZZ FLUOROSCOPY OF MULTIPLE CORONARY ARTERIES USING LOW OSMOLAR CONTRAST: ICD-10-PCS | Performed by: INTERNAL MEDICINE

## 2023-11-20 PROCEDURE — 99152 MOD SED SAME PHYS/QHP 5/>YRS: CPT | Performed by: INTERNAL MEDICINE

## 2023-11-20 PROCEDURE — 93306 TTE W/DOPPLER COMPLETE: CPT

## 2023-11-20 PROCEDURE — 93454 CORONARY ARTERY ANGIO S&I: CPT | Performed by: INTERNAL MEDICINE

## 2023-11-20 PROCEDURE — 99153 MOD SED SAME PHYS/QHP EA: CPT | Performed by: INTERNAL MEDICINE

## 2023-11-20 PROCEDURE — C1894 INTRO/SHEATH, NON-LASER: HCPCS | Performed by: INTERNAL MEDICINE

## 2023-11-20 PROCEDURE — 83735 ASSAY OF MAGNESIUM: CPT | Performed by: STUDENT IN AN ORGANIZED HEALTH CARE EDUCATION/TRAINING PROGRAM

## 2023-11-20 PROCEDURE — 85730 THROMBOPLASTIN TIME PARTIAL: CPT | Performed by: INTERNAL MEDICINE

## 2023-11-20 RX ORDER — CLOPIDOGREL BISULFATE 75 MG/1
75 TABLET ORAL DAILY
Status: DISCONTINUED | OUTPATIENT
Start: 2023-11-21 | End: 2023-11-21 | Stop reason: HOSPADM

## 2023-11-20 RX ORDER — MIDAZOLAM HYDROCHLORIDE 2 MG/2ML
INJECTION, SOLUTION INTRAMUSCULAR; INTRAVENOUS CODE/TRAUMA/SEDATION MEDICATION
Status: DISCONTINUED | OUTPATIENT
Start: 2023-11-20 | End: 2023-11-20 | Stop reason: HOSPADM

## 2023-11-20 RX ORDER — NITROGLYCERIN 20 MG/100ML
INJECTION INTRAVENOUS CODE/TRAUMA/SEDATION MEDICATION
Status: DISCONTINUED | OUTPATIENT
Start: 2023-11-20 | End: 2023-11-20 | Stop reason: HOSPADM

## 2023-11-20 RX ORDER — SODIUM CHLORIDE 9 MG/ML
50 INJECTION, SOLUTION INTRAVENOUS CONTINUOUS
Status: DISCONTINUED | OUTPATIENT
Start: 2023-11-20 | End: 2023-11-20

## 2023-11-20 RX ORDER — FENTANYL CITRATE 50 UG/ML
INJECTION, SOLUTION INTRAMUSCULAR; INTRAVENOUS CODE/TRAUMA/SEDATION MEDICATION
Status: DISCONTINUED | OUTPATIENT
Start: 2023-11-20 | End: 2023-11-20 | Stop reason: HOSPADM

## 2023-11-20 RX ORDER — HEPARIN SODIUM 1000 [USP'U]/ML
INJECTION, SOLUTION INTRAVENOUS; SUBCUTANEOUS CODE/TRAUMA/SEDATION MEDICATION
Status: DISCONTINUED | OUTPATIENT
Start: 2023-11-20 | End: 2023-11-20 | Stop reason: HOSPADM

## 2023-11-20 RX ORDER — LIDOCAINE HYDROCHLORIDE 10 MG/ML
INJECTION, SOLUTION EPIDURAL; INFILTRATION; INTRACAUDAL; PERINEURAL CODE/TRAUMA/SEDATION MEDICATION
Status: DISCONTINUED | OUTPATIENT
Start: 2023-11-20 | End: 2023-11-20 | Stop reason: HOSPADM

## 2023-11-20 RX ORDER — VERAPAMIL HYDROCHLORIDE 2.5 MG/ML
INJECTION, SOLUTION INTRAVENOUS CODE/TRAUMA/SEDATION MEDICATION
Status: DISCONTINUED | OUTPATIENT
Start: 2023-11-20 | End: 2023-11-20 | Stop reason: HOSPADM

## 2023-11-20 RX ORDER — CLOPIDOGREL BISULFATE 75 MG/1
300 TABLET ORAL ONCE
Status: COMPLETED | OUTPATIENT
Start: 2023-11-20 | End: 2023-11-20

## 2023-11-20 RX ADMIN — SODIUM CHLORIDE 50 ML/HR: 0.9 INJECTION, SOLUTION INTRAVENOUS at 09:55

## 2023-11-20 RX ADMIN — ASPIRIN 81 MG CHEWABLE TABLET 81 MG: 81 TABLET CHEWABLE at 08:43

## 2023-11-20 RX ADMIN — CLOPIDOGREL BISULFATE 300 MG: 75 TABLET ORAL at 11:48

## 2023-11-20 RX ADMIN — OXYCODONE HYDROCHLORIDE 5 MG: 5 TABLET ORAL at 06:00

## 2023-11-20 RX ADMIN — AMLODIPINE BESYLATE 5 MG: 5 TABLET ORAL at 08:43

## 2023-11-20 RX ADMIN — METOPROLOL SUCCINATE 100 MG: 100 TABLET, EXTENDED RELEASE ORAL at 08:43

## 2023-11-20 RX ADMIN — ATORVASTATIN CALCIUM 40 MG: 40 TABLET, FILM COATED ORAL at 17:00

## 2023-11-20 RX ADMIN — ACETAMINOPHEN 325MG 650 MG: 325 TABLET ORAL at 08:43

## 2023-11-20 RX ADMIN — PANTOPRAZOLE SODIUM 40 MG: 40 TABLET, DELAYED RELEASE ORAL at 05:59

## 2023-11-20 RX ADMIN — OXYCODONE HYDROCHLORIDE 5 MG: 5 TABLET ORAL at 16:59

## 2023-11-20 NOTE — PROGRESS NOTES
Cardiology Progress Note - Jassi Orlando 80 y.o. female MRN: [id number]    Unit/Bed#: E4 -01 Encounter: [id number]      Assessment & Plan:    Non-MI troponin elevation  - Troponin trend 1461->1643->1504->1357->1703->2415  -Unclear cause for her troponin elevation, possible microvascular disease   -Plan to do 3 months of aspirin 81 mg daily and Plavix 75 mg daily and then stop Plavix  - Pending TTE    Atherosclerosis of native coronary artery of native heart without angina pectoris  -Prior NSTEMI in April 2018, LHC at that time showed had a percent distal LAD with dissection-> no intervention performed due to distal vessel dissection and very small size  - TTE/7/2018 showed EF 60%, severe apical hypokinesis, mild MR, mild AI  - On aspirin 81 mg daily prior to admission    Essential hypertension  - Outpatient Rx amlodipine 5 mg daily, and lisinopril/HCTZ 10/12.5 mg daily  - Currently holding lisinopril/HCTZ for [facility] today, plan to resume tomorrow    Hypercholesteremia  -Continue with atorvastatin 40 mg daily    Morbid obesity due to excess calories ([address])      Summary:  - LHC today showed a 50% tubular lesion at the site of the prior dissection, otherwise no new occlusions, plan for medical management  - Will load with Plavix 300 mg x 1 today and then continue with 75 mg daily starting tomorrow  - Continue with aspirin 81 mg daily, and atorvastatin 40 mg daily  - Plan to resume lisinopril and HCTZ tomorrow  - Follow-up TTE  -Recommend monitoring today for recurrent anginal symptoms, hopefully discharge home tomorrow if patient is doing well    Subjective:   No significant events overnight. Patient reports feeling well this morning. Ever since she had the nitro paste put on, she has not had recurrent chest pain. Denies shortness of breath, orthopnea, abdominal pain, nausea, vomiting, fever, chills, headache, dizziness or palpitations.     Objective:     Vitals: Blood pressure 130/70, pulse 73, temperature Lopez Redhead ) 97.2 °F (36.2 °C), temperature source Temporal, resp. rate 18, weight 119 kg (262 lb 2 oz), SpO2 94 %. , Body mass index is 43.62 kg/m².,   Orthostatic Blood Pressures      Flowsheet Row Most Recent Value   Blood Pressure 130/70 filed at 11/20/2023 1015   Patient Position - Orthostatic VS Lying filed at 11/20/2023 1015              Intake/Output Summary (Last 24 hours) at 11/20/2023 1048  Last data filed at 11/20/2023 0701  Gross per 24 hour   Intake 438.88 ml   Output --   Net 438.88 ml           Physical Exam:    GEN: Kana Ortiz appears well, alert and oriented x 3, pleasant and cooperative   HEENT: Mucous membranes moist, no scleral icterus, no conjunctival pallor  NECK: No elevated JVD  HEART: Regular rate and rhythm, normal S1 and S2, no murmurs or rubs   LUNGS: clear to auscultation bilaterally; no wheezes, rales, or rhonchi   ABDOMEN: normal bowel sounds, soft, no tenderness, no distention  EXTREMITIES: peripheral pulses normal; no lower extremity edema   NEURO: no focal findings   SKIN: No lesions or rashes on exposed skin        Current Facility-Administered Medications:     acetaminophen (TYLENOL) tablet 650 mg, 650 mg, Oral, Q6H PRN, Flora Stains, CRNP, 650 mg at 11/20/23 0843    albuterol (PROVENTIL HFA,VENTOLIN HFA) inhaler 2 puff, 2 puff, Inhalation, Q6H PRN, Flora Stains, CRNP    ALPRAZolam Calvin Negrito) tablet 0.25 mg, 0.25 mg, Oral, TID PRN, Flora Stains, CRNP, 0.25 mg at 11/19/23 1704    amLODIPine (NORVASC) tablet 5 mg, 5 mg, Oral, Daily, Suzan Grecsek, CRNP, 5 mg at 11/20/23 0843    aspirin chewable tablet 81 mg, 81 mg, Oral, Daily, Suzan Grecsek, CRNP, 81 mg at 11/20/23 0843    atorvastatin (LIPITOR) tablet 40 mg, 40 mg, Oral, QPM, Suzan Grecsek, CRNP, 40 mg at 11/19/23 1704    clopidogrel (PLAVIX) tablet 300 mg, 300 mg, Oral, Once, Shantal Moore MD    [START ON 11/21/2023] clopidogrel (PLAVIX) tablet 75 mg, 75 mg, Oral, Daily, Shantal Moore MD fluticasone (FLONASE) 50 mcg/act nasal spray 1 spray, 1 spray, Nasal, Daily PRN, Valley Dry, CRNP    loratadine (CLARITIN) tablet 10 mg, 10 mg, Oral, Daily PRN, [place] Dry, CRNP    metoprolol succinate (TOPROL-XL) 24 hr tablet 100 mg, 100 mg, Oral, Daily, Suzan Grecsek, CRNP, 100 mg at 11/20/23 0843    morphine injection 2 mg, 2 mg, Intravenous, Q4H PRN, Valley Dry, CRNP, 2 mg at 11/18/23 2103    nitroglycerin (NITROSTAT) SL tablet 0.4 mg, 0.4 mg, Sublingual, Q5 Min PRN, Valley Dry, CRNP, 0.4 mg at 11/18/23 1802    ondansetron (ZOFRAN) injection 4 mg, 4 mg, Intravenous, Q6H PRN, Valley Dry, CRNP, 4 mg at 11/19/23 1940    oxyCODONE (ROXICODONE) IR tablet 5 mg, 5 mg, Oral, Q4H PRN, Valley Dry, CRNP, 5 mg at 11/20/23 0600    pantoprazole (PROTONIX) EC tablet 40 mg, 40 mg, Oral, Early Morning, Suzan Grecsek, CRNP, 40 mg at 11/20/23 0559    sodium chloride 0.9 % infusion, 50 mL/hr, Intravenous, Continuous, Suzan Grecsek, CRNP, Last Rate: 50 mL/hr at 11/20/23 0955, 50 mL/hr at 11/20/23 0955    Labs & Results:    Lab Results   Component Value Date    TROPONINI <0.02 01/13/2020    TROPONINI 7.40 (H) 04/06/2018    TROPONINI 6.31 (H) 04/05/2018       Lab Results   Component Value Date    GLUCOSE 104 01/23/2015    CALCIUM 9.2 11/20/2023     01/23/2015    K 3.9 11/20/2023    CO2 28 11/20/2023     11/20/2023    BUN 14 11/20/2023    CREATININE 0.54 (L) 11/20/2023       Lab Results   Component Value Date    WBC 13.13 (H) 11/20/2023    HGB 13.6 11/20/2023    HCT 40.5 11/20/2023    MCV 87 11/20/2023     11/20/2023     Results from last 7 days   Lab Units 11/18/23  0947   INR  1.04       Lab Results   Component Value Date    CHOL 182 01/23/2015     Lab Results   Component Value Date    HDL 46 (L) 11/02/2022    HDL 45 11/18/2020     Lab Results   Component Value Date    LDLCALC 82 11/02/2022    LDLCALC 61 11/18/2020     Lab Results   Component Value Date    TRIG 178 (H) 11/02/2022 TRIG 175 (H) 11/18/2020       Lab Results   Component Value Date    ALT 10 11/18/2023    AST 21 11/18/2023    ALKPHOS 92 11/18/2023         EKG personally reviewed by )Ko Ambriz MD. No acute changes   TELE: No significant arrhythmias seen on telemetry review.

## 2023-11-20 NOTE — PROGRESS NOTES
[facility]  Progress Note  Name: Liana Monge  MRN: [id number]  Unit/Bed#: E4 -01 I Date of Admission: 2023   Date of Service: 2023 I Hospital Day: 2    Assessment/Plan   * Elevated troponin level not due myocardial infarction  Assessment & Plan  61-year-old female presented to the [place] ED as a result of substernal chest pain which radiated to her right arm. There was evidence of elevated troponin levels and she was initially managed as a case of type I NSTEMI. She underwent cardiac catheterization which did not show any evidence of new occlusion. Appreciate cardiology recommendations. Plan for dual antiplatelet therapy for 3 months. Then aspirin monotherapy  Medical management overnight, then anticipate possible discharge tomorrow. Continue Metoprolol, statin    Hypercholesteremia  Assessment & Plan  Continue statin    Morbid obesity due to excess calories [facility])  Assessment & Plan  Encourage lifestyle modifications    Essential hypertension  Assessment & Plan  Controlled  Continue amlodipine and metoprolol  Lisinopril-hctz currently on hold           VTE Pharmacologic Prophylaxis:   Pharmacologic: Heparin Drip  Mechanical VTE Prophylaxis in Place: Yes    Discussions with Specialists or Other Care Team Provider: nursing, cardiology    Education and Discussions with Family / Patient: patient    Current Length of Stay: 2 day(s)    Current Patient Status: Inpatient   Certification Statement: The patient will continue to require additional inpatient hospital stay due to medication optimization,c ardiology reevaluation    Discharge Plan: active    Code Status: Level 1 - Full Code      Subjective:   Patient seen and examined after cath. Currently chest pain free.     Objective:     Vitals:   Temp (24hrs), Av.5 °F (36.4 °C), Min:96.5 °F (35.8 °C), Max:98.9 °F (37.2 °C)    Temp:  [96.5 °F (35.8 °C)-98.9 °F (37.2 °C)] 97.2 °F (36.2 °C)  HR:  Bell Organ 69  Resp:  [18] 18  BP: (113-159)/(61-88) 139/82  SpO2:  [90 %-94 %] 94 %  Body mass index is 43.6 kg/m². Input and Output Summary (last 24 hours): Intake/Output Summary (Last 24 hours) at 11/20/2023 1638  Last data filed at 11/20/2023 0701  Gross per 24 hour   Intake 438.88 ml   Output --   Net 438.88 ml       Physical Exam:     Physical Exam  Vitals reviewed. Constitutional:       General: She is not in acute distress. HENT:      Head: Normocephalic. Nose: Nose normal.      Mouth/Throat:      Mouth: Mucous membranes are moist.   Eyes:      General: No scleral icterus. Cardiovascular:      Rate and Rhythm: Normal rate. Pulmonary:      Effort: Pulmonary effort is normal. No respiratory distress. Abdominal:      General: There is no distension. Palpations: Abdomen is soft. Tenderness: There is no abdominal tenderness. Skin:     General: Skin is warm. Neurological:      Mental Status: She is alert. Mental status is at baseline. Psychiatric:         Mood and Affect: Mood normal.         Behavior: Behavior normal.       Additional Data:     Labs:    Results from last 7 days   Lab Units 11/20/23  0601 11/18/23  0947   WBC Thousand/uL 13.13* 10.01   HEMOGLOBIN g/dL 13.6 14.5   HEMATOCRIT % 40.5 43.8   PLATELETS Thousands/uL 201 262   NEUTROS PCT %  --  79*   LYMPHS PCT %  --  11*   MONOS PCT %  --  7   EOS PCT %  --  2     Results from last 7 days   Lab Units 11/20/23  0601 11/18/23  0947   SODIUM mmol/L 138 140   POTASSIUM mmol/L 3.9 3.5   CHLORIDE mmol/L 103 102   CO2 mmol/L 28 28   BUN mg/dL 14 18   CREATININE mg/dL 0.54* 0.64   ANION GAP mmol/L 7 10   CALCIUM mg/dL 9.2 9.3   ALBUMIN g/dL  --  4.2   TOTAL BILIRUBIN mg/dL  --  0.78   ALK PHOS U/L  --  92   ALT U/L  --  10   AST U/L  --  21   GLUCOSE RANDOM mg/dL 124 152*     Results from last 7 days   Lab Units 11/18/23  0947   INR  1.04                       * I Have Reviewed All Lab Data Listed Above.   * Additional Pertinent Lab Tests Reviewed: [facility] Admission Reviewed    Mobility:  Basic Mobility Inpatient Raw Score: 24  -HLM Goal: 8: Walk 250 feet or more  JH-HLM Achieved: 8: Walk 250 feet ot more    Lines:   Invasive Devices       Peripheral Intravenous Line  Duration             Peripheral IV 11/18/23 Left Antecubital 2 days    Peripheral IV 11/18/23 Right Antecubital 2 days                       Imaging:    Imaging Reports Reviewed Today Include: cardiac cath    Recent Cultures (last 7 days):           Last 24 Hours Medication List:   Current Facility-Administered Medications   Medication Dose Route Frequency Provider Last Rate    acetaminophen  650 mg Oral Q6H PRN Fiona Scott, BILLYNP      albuterol  2 puff Inhalation Q6H PRN Fiona Scott, BILLYNP      ALPRAZolam  0.25 mg Oral TID PRN iFona Scott, MARKO      amLODIPine  5 mg Oral Daily Suzan Grecsek, CRNP      aspirin  81 mg Oral Daily Fiona Scott, CRNP      atorvastatin  40 mg Oral QPM MARKO Scott      [START ON 11/21/2023] clopidogrel  75 mg Oral Daily Renea Guidry MD      fluticasone  1 spray Nasal Daily PRN Fiona Scott, BILLYNP      loratadine  10 mg Oral Daily PRN Fiona Scott, BILLYNP      metoprolol succinate  100 mg Oral Daily Suzan Grecsek, CRNP      morphine injection  2 mg Intravenous Q4H PRN Fiona Scott, MARKO      nitroglycerin  0.4 mg Sublingual Q5 Min PRN Suzan Patelek, CRNP      ondansetron  4 mg Intravenous Q6H PRN Fiona Scott, BILLYNP      oxyCODONE  5 mg Oral Q4H PRN Suzan Patelek, BILLYNP      pantoprazole  40 mg Oral Early Morning MARKO Scott          Today, Patient Was Seen By: Keith Knight MD    ** Please Note: Dictation voice to text software may have been used in the creation of this document.  **

## 2023-11-20 NOTE — PROGRESS NOTES
Cardiac cath performed via the R radial artery. The previous dissection in the mid LAD has healed with THEO 3 flow to the distal LAD ( now a moderate sized vessel )  The site of the dissection has a 50% tubular lesion. Patent RCA and L Cx. Medical management. ASA and Plavix x 3 months, then can d/c Plavix.

## 2023-11-20 NOTE — ASSESSMENT & PLAN NOTE
59-year-old female presented to the [place] ED as a result of substernal chest pain which radiated to her right arm. There was evidence of elevated troponin levels and she was initially managed as a case of type I NSTEMI. She underwent cardiac catheterization which did not show any evidence of new occlusion. Appreciate cardiology recommendations. Plan for dual antiplatelet therapy for 3 months. Then aspirin monotherapy  Medical management overnight, then anticipate possible discharge tomorrow.   Continue Metoprolol, statin

## 2023-11-20 NOTE — DISCHARGE INSTR - AVS FIRST PAGE
1. Please see the post cardiac catheterization dishcarge instructions. No heavy lifting, greater than 10 lbs. or strenuous  activity for 48 hrs. 2.Remove band aid tomorrow. Shower and wash area- wrist gently with soap and water- beginning tomorrow. Rinse and pat dry. Apply new water seal band aid. Repeat this process for 5 days. No powders, creams lotions or antibiotic ointments  for 5 days. No tub baths, hot tubs or swimming for 5 days. 3. Please call our office ([phone]) if you have any fever, redness, swelling, discharge from your wrist access site.     4.No driving for 1 day

## 2023-11-20 NOTE — PLAN OF CARE
Problem: PAIN - ADULT  Goal: Verbalizes/displays adequate comfort level or baseline comfort level  Description: Interventions:  - Encourage patient to monitor pain and request assistance  - Assess pain using appropriate pain scale  - Administer analgesics based on type and severity of pain and evaluate response  - Implement non-pharmacological measures as appropriate and evaluate response  - Consider cultural and social influences on pain and pain management  - Notify physician/advanced practitioner if interventions unsuccessful or patient reports new pain  Outcome: Progressing       Problem: DISCHARGE PLANNING  Goal: Discharge to home or other facility with appropriate resources  Description: INTERVENTIONS:  - Identify barriers to discharge w/patient and caregiver  - Arrange for needed discharge resources and transportation as appropriate  - Identify discharge learning needs (meds, wound care, etc.)  - Arrange for interpretive services to assist at discharge as needed  - Refer to Case Management Department for coordinating discharge planning if the patient needs post-hospital services based on physician/advanced practitioner order or complex needs related to functional status, cognitive ability, or social support system  Outcome: Progressing     Problem: Knowledge Deficit  Goal: Patient/family/caregiver demonstrates understanding of disease process, treatment plan, medications, and discharge instructions  Description: Complete learning assessment and assess knowledge base.   Interventions:  - Provide teaching at level of understanding  - Provide teaching via preferred learning methods  Outcome: Progressing

## 2023-11-20 NOTE — PLAN OF CARE
Problem: PAIN - ADULT  Goal: Verbalizes/displays adequate comfort level or baseline comfort level  Description: Interventions:  - Encourage patient to monitor pain and request assistance  - Assess pain using appropriate pain scale  - Administer analgesics based on type and severity of pain and evaluate response  - Implement non-pharmacological measures as appropriate and evaluate response  - Consider cultural and social influences on pain and pain management  - Notify physician/advanced practitioner if interventions unsuccessful or patient reports new pain  Outcome: Progressing     Problem: SAFETY ADULT  Goal: Patient will remain free of falls  Description: INTERVENTIONS:  - Educate patient/family on patient safety including physical limitations  - Instruct patient to call for assistance with activity   - Consult OT/PT to assist with strengthening/mobility   - Keep Call bell within reach  - Keep bed low and locked with side rails adjusted as appropriate  - Keep care items and personal belongings within reach  - Initiate and maintain comfort rounds  - Make Fall Risk Sign visible to staff  - Offer Toileting every 2 Hours, in advance of need  - Initiate/Maintain bed alarm  - Obtain necessary fall risk management equipment  - Apply yellow socks and bracelet for high fall risk patients  - Consider moving patient to room near nurses station  Outcome: Progressing  Goal: Maintain or return to baseline ADL function  Description: INTERVENTIONS:  -  Assess patient's ability to carry out ADLs; assess patient's baseline for ADL function and identify physical deficits which impact ability to perform ADLs (bathing, care of mouth/teeth, toileting, grooming, dressing, etc.)  - Assess/evaluate cause of self-care deficits   - Assess range of motion  - Assess patient's mobility; develop plan if impaired  - Assess patient's need for assistive devices and provide as appropriate  - Encourage maximum independence but intervene and supervise when necessary  - Involve family in performance of ADLs  - Assess for home care needs following discharge   - Consider OT consult to assist with ADL evaluation and planning for discharge  - Provide patient education as appropriate  Outcome: Progressing  Goal: Maintains/Returns to pre admission functional level  Description: INTERVENTIONS:  - Perform AM-PAC 6 Click Basic Mobility/ Daily Activity assessment daily.  - Set and communicate daily mobility goal to care team and patient/family/caregiver. - Collaborate with rehabilitation services on mobility goals if consulted  - Perform Range of Motion 3 times a day. - Reposition patient every 2 hours. - Dangle patient 3 times a day  - Stand patient 3 times a day  - Ambulate patient 3 times a day  - Out of bed to chair 3 times a day   - Out of bed for meals 3 times a day  - Out of bed for toileting  - Record patient progress and toleration of activity level   Outcome: Progressing     Problem: DISCHARGE PLANNING  Goal: Discharge to home or other facility with appropriate resources  Description: INTERVENTIONS:  - Identify barriers to discharge w/patient and caregiver  - Arrange for needed discharge resources and transportation as appropriate  - Identify discharge learning needs (meds, wound care, etc.)  - Arrange for interpretive services to assist at discharge as needed  - Refer to Case Management Department for coordinating discharge planning if the patient needs post-hospital services based on physician/advanced practitioner order or complex needs related to functional status, cognitive ability, or social support system  Outcome: Progressing     Problem: Knowledge Deficit  Goal: Patient/family/caregiver demonstrates understanding of disease process, treatment plan, medications, and discharge instructions  Description: Complete learning assessment and assess knowledge base.   Interventions:  - Provide teaching at level of understanding  - Provide teaching via preferred learning methods  Outcome: Progressing     Problem: Prexisting or High Potential for Compromised Skin Integrity  Goal: Skin integrity is maintained or improved  Description: INTERVENTIONS:  - Identify patients at risk for skin breakdown  - Assess and monitor skin integrity  - Assess and monitor nutrition and hydration status  - Monitor labs   - Assess for incontinence   - Turn and reposition patient  - Assist with mobility/ambulation  - Relieve pressure over bony prominences  - Avoid friction and shearing  - Provide appropriate hygiene as needed including keeping skin clean and dry  - Evaluate need for skin moisturizer/barrier cream  - Collaborate with interdisciplinary team   - Patient/family teaching  - Consider wound care consult   Outcome: Progressing

## 2023-11-21 VITALS
TEMPERATURE: 97.6 F | HEIGHT: 65 IN | BODY MASS INDEX: 43.71 KG/M2 | HEART RATE: 95 BPM | SYSTOLIC BLOOD PRESSURE: 133 MMHG | WEIGHT: 262.35 LBS | OXYGEN SATURATION: 92 % | DIASTOLIC BLOOD PRESSURE: 72 MMHG | RESPIRATION RATE: 18 BRPM

## 2023-11-21 LAB
ANION GAP SERPL CALCULATED.3IONS-SCNC: 5 MMOL/L
BUN SERPL-MCNC: 11 MG/DL (ref 5–25)
CALCIUM SERPL-MCNC: 8.9 MG/DL (ref 8.4–10.2)
CHLORIDE SERPL-SCNC: 103 MMOL/L (ref 96–108)
CO2 SERPL-SCNC: 31 MMOL/L (ref 21–32)
CREAT SERPL-MCNC: 0.6 MG/DL (ref 0.6–1.3)
ERYTHROCYTE [DISTWIDTH] IN BLOOD BY AUTOMATED COUNT: 13.1 % (ref 11.6–15.1)
GFR SERPL CREATININE-BSD FRML MDRD: 94 ML/MIN/1.73SQ M
GLUCOSE SERPL-MCNC: 123 MG/DL (ref 65–140)
HCT VFR BLD AUTO: 38.8 % (ref 34.8–46.1)
HGB BLD-MCNC: 12.9 G/DL (ref 11.5–15.4)
MAGNESIUM SERPL-MCNC: 2.1 MG/DL (ref 1.9–2.7)
MCH RBC QN AUTO: 29.1 PG (ref 26.8–34.3)
MCHC RBC AUTO-ENTMCNC: 33.2 G/DL (ref 31.4–37.4)
MCV RBC AUTO: 88 FL (ref 82–98)
PLATELET # BLD AUTO: 203 THOUSANDS/UL (ref 149–390)
PMV BLD AUTO: 9.8 FL (ref 8.9–12.7)
POTASSIUM SERPL-SCNC: 3.8 MMOL/L (ref 3.5–5.3)
RBC # BLD AUTO: 4.43 MILLION/UL (ref 3.81–5.12)
SODIUM SERPL-SCNC: 139 MMOL/L (ref 135–147)
WBC # BLD AUTO: 10.26 THOUSAND/UL (ref 4.31–10.16)

## 2023-11-21 PROCEDURE — 83735 ASSAY OF MAGNESIUM: CPT | Performed by: STUDENT IN AN ORGANIZED HEALTH CARE EDUCATION/TRAINING PROGRAM

## 2023-11-21 PROCEDURE — 99232 SBSQ HOSP IP/OBS MODERATE 35: CPT | Performed by: STUDENT IN AN ORGANIZED HEALTH CARE EDUCATION/TRAINING PROGRAM

## 2023-11-21 PROCEDURE — 85027 COMPLETE CBC AUTOMATED: CPT | Performed by: STUDENT IN AN ORGANIZED HEALTH CARE EDUCATION/TRAINING PROGRAM

## 2023-11-21 PROCEDURE — 99239 HOSP IP/OBS DSCHRG MGMT >30: CPT | Performed by: STUDENT IN AN ORGANIZED HEALTH CARE EDUCATION/TRAINING PROGRAM

## 2023-11-21 PROCEDURE — 80048 BASIC METABOLIC PNL TOTAL CA: CPT | Performed by: STUDENT IN AN ORGANIZED HEALTH CARE EDUCATION/TRAINING PROGRAM

## 2023-11-21 RX ORDER — CLOPIDOGREL BISULFATE 75 MG/1
75 TABLET ORAL DAILY
Qty: 90 TABLET | Refills: 0 | Status: SHIPPED | OUTPATIENT
Start: 2023-11-22 | End: 2024-02-20

## 2023-11-21 RX ORDER — ISOSORBIDE MONONITRATE 30 MG/1
30 TABLET, EXTENDED RELEASE ORAL DAILY
Status: DISCONTINUED | OUTPATIENT
Start: 2023-11-21 | End: 2023-11-21 | Stop reason: HOSPADM

## 2023-11-21 RX ORDER — LISINOPRIL 10 MG/1
10 TABLET ORAL DAILY
Qty: 30 TABLET | Refills: 0 | Status: SHIPPED | OUTPATIENT
Start: 2023-11-22 | End: 2023-12-22

## 2023-11-21 RX ORDER — ISOSORBIDE MONONITRATE 30 MG/1
30 TABLET, EXTENDED RELEASE ORAL DAILY
Qty: 30 TABLET | Refills: 0 | Status: SHIPPED | OUTPATIENT
Start: 2023-11-21 | End: 2023-12-21

## 2023-11-21 RX ORDER — LISINOPRIL 10 MG/1
10 TABLET ORAL DAILY
Status: DISCONTINUED | OUTPATIENT
Start: 2023-11-21 | End: 2023-11-21 | Stop reason: HOSPADM

## 2023-11-21 RX ORDER — ASPIRIN 81 MG/1
81 TABLET, CHEWABLE ORAL DAILY
Qty: 90 TABLET | Refills: 0 | Status: SHIPPED | OUTPATIENT
Start: 2023-11-21 | End: 2024-02-19

## 2023-11-21 RX ADMIN — LISINOPRIL 10 MG: 10 TABLET ORAL at 08:44

## 2023-11-21 RX ADMIN — AMLODIPINE BESYLATE 5 MG: 5 TABLET ORAL at 08:04

## 2023-11-21 RX ADMIN — ISOSORBIDE MONONITRATE 30 MG: 30 TABLET, EXTENDED RELEASE ORAL at 10:14

## 2023-11-21 RX ADMIN — METOPROLOL SUCCINATE 100 MG: 100 TABLET, EXTENDED RELEASE ORAL at 08:04

## 2023-11-21 RX ADMIN — CLOPIDOGREL BISULFATE 75 MG: 75 TABLET ORAL at 08:04

## 2023-11-21 RX ADMIN — ASPIRIN 81 MG CHEWABLE TABLET 81 MG: 81 TABLET CHEWABLE at 08:04

## 2023-11-21 RX ADMIN — PANTOPRAZOLE SODIUM 40 MG: 40 TABLET, DELAYED RELEASE ORAL at 06:29

## 2023-11-21 RX ADMIN — ACETAMINOPHEN 325MG 650 MG: 325 TABLET ORAL at 08:07

## 2023-11-21 NOTE — PLAN OF CARE
Problem: PAIN - ADULT  Goal: Verbalizes/displays adequate comfort level or baseline comfort level  Description: Interventions:  - Encourage patient to monitor pain and request assistance  - Assess pain using appropriate pain scale  - Administer analgesics based on type and severity of pain and evaluate response  - Implement non-pharmacological measures as appropriate and evaluate response  - Consider cultural and social influences on pain and pain management  - Notify physician/advanced practitioner if interventions unsuccessful or patient reports new pain  Outcome: Progressing     Problem: SAFETY ADULT  Goal: Patient will remain free of falls  Description: INTERVENTIONS:  - Educate patient/family on patient safety including physical limitations  - Instruct patient to call for assistance with activity   - Consult OT/PT to assist with strengthening/mobility   - Keep Call bell within reach  - Keep bed low and locked with side rails adjusted as appropriate  - Keep care items and personal belongings within reach  - Initiate and maintain comfort rounds  - Make Fall Risk Sign visible to staff  - Offer Toileting every 2 Hours, in advance of need  - Initiate/Maintain bed/ chair alarm  - Obtain necessary fall risk management equipment:   - Apply yellow socks and bracelet for high fall risk patients  - Consider moving patient to room near nurses station  Outcome: Progressing  Goal: Maintain or return to baseline ADL function  Description: INTERVENTIONS:  -  Assess patient's ability to carry out ADLs; assess patient's baseline for ADL function and identify physical deficits which impact ability to perform ADLs (bathing, care of mouth/teeth, toileting, grooming, dressing, etc.)  - Assess/evaluate cause of self-care deficits   - Assess range of motion  - Assess patient's mobility; develop plan if impaired  - Assess patient's need for assistive devices and provide as appropriate  - Encourage maximum independence but intervene and supervise when necessary  - Involve family in performance of ADLs  - Assess for home care needs following discharge   - Consider OT consult to assist with ADL evaluation and planning for discharge  - Provide patient education as appropriate  Outcome: Progressing  Goal: Maintains/Returns to pre admission functional level  Description: INTERVENTIONS:  - Perform AM-PAC 6 Click Basic Mobility/ Daily Activity assessment daily.  - Set and communicate daily mobility goal to care team and patient/family/caregiver. - Collaborate with rehabilitation services on mobility goals if consulted  - Perform Range of Motion 4 times a day. - Reposition patient every 2 hours. - Dangle patient 4 times a day  - Stand patient 4 times a day  - Ambulate patient 4 times a day  - Out of bed to chair 4 times a day   - Out of bed for meals 4 times a day  - Out of bed for toileting  - Record patient progress and toleration of activity level   Outcome: Progressing     Problem: DISCHARGE PLANNING  Goal: Discharge to home or other facility with appropriate resources  Description: INTERVENTIONS:  - Identify barriers to discharge w/patient and caregiver  - Arrange for needed discharge resources and transportation as appropriate  - Identify discharge learning needs (meds, wound care, etc.)  - Arrange for interpretive services to assist at discharge as needed  - Refer to Case Management Department for coordinating discharge planning if the patient needs post-hospital services based on physician/advanced practitioner order or complex needs related to functional status, cognitive ability, or social support system  Outcome: Progressing     Problem: Knowledge Deficit  Goal: Patient/family/caregiver demonstrates understanding of disease process, treatment plan, medications, and discharge instructions  Description: Complete learning assessment and assess knowledge base.   Interventions:  - Provide teaching at level of understanding  - Provide teaching via preferred learning methods  Outcome: Progressing     Problem: Prexisting or High Potential for Compromised Skin Integrity  Goal: Skin integrity is maintained or improved  Description: INTERVENTIONS:  - Identify patients at risk for skin breakdown  - Assess and monitor skin integrity  - Assess and monitor nutrition and hydration status  - Monitor labs   - Assess for incontinence   - Turn and reposition patient  - Assist with mobility/ambulation  - Relieve pressure over bony prominences  - Avoid friction and shearing  - Provide appropriate hygiene as needed including keeping skin clean and dry  - Evaluate need for skin moisturizer/barrier cream  - Collaborate with interdisciplinary team   - Patient/family teaching  - Consider wound care consult   Outcome: Progressing

## 2023-11-21 NOTE — RESTORATIVE TECHNICIAN NOTE
Restorative Technician Note      Patient Name: Fidencio Alston     Restorative Tech Visit Date: 11/21/23  Note Type: Mobility  Patient Position Upon Consult: Supine  Activity Performed: Ambulated  Patient Position at End of Consult: Supine;  All needs within reach

## 2023-11-21 NOTE — PLAN OF CARE
Problem: PAIN - ADULT  Goal: Verbalizes/displays adequate comfort level or baseline comfort level  Description: Interventions:   - Encourage patient to monitor pain and request assistance  - Assess pain using appropriate pain scale  - Administer analgesics based on type and severity of pain and evaluate response  - Implement non-pharmacological measures as appropriate and evaluate response  - Consider cultural and social influences on pain and pain management  - Notify physician/advanced practitioner if interventions unsuccessful or patient reports new pain  Outcome: Progressing     Problem: SAFETY ADULT  Goal: Patient will remain free of falls  Description: INTERVENTIONS:  - Educate patient/family on patient safety including physical limitations  - Instruct patient to call for assistance with activity   - Consult OT/PT to assist with strengthening/mobility   - Keep Call bell within reach  - Keep bed low and locked with side rails adjusted as appropriate  - Keep care items and personal belongings within reach  - Initiate and maintain comfort rounds  -Outcome: Progressing  Goal: Maintain or return to baseline ADL function  Description: INTERVENTIONS:  -  Assess patient's ability to carry out ADLs; assess patient's baseline for ADL function and identify physical deficits which impact ability to perform ADLs (bathing, care of mouth/teeth, toileting, grooming, dressing, etc.)  - Assess/evaluate cause of self-care deficits   - Assess range of motion  - Assess patient's mobility; develop plan if impaired  - Assess patient's need for assistive devices and provide as appropriate  - Encourage maximum independence but intervene and supervise when necessary  - Involve family in performance of ADLs  - Assess for home care needs following discharge   - Consider OT consult to assist with ADL evaluation and planning for discharge  - Provide patient education as appropriate  Outcome: Progressing  Goal: Maintains/Returns to pre admission functional level  Description: INTERVENTIONS:  - Perform AM-PAC 6 Click Basic Mobility/ Daily Activity assessment daily.  - Set and communicate daily mobility goal to care team and patient/family/caregiver. - Collaborate with rehabilitation services on mobility goals if consulted  - Out of bed for toileting  - Record patient progress and toleration of activity level   Outcome: Progressing     Problem: DISCHARGE PLANNING  Goal: Discharge to home or other facility with appropriate resources  Description: INTERVENTIONS:  - Identify barriers to discharge w/patient and caregiver  - Arrange for needed discharge resources and transportation as appropriate  - Identify discharge learning needs (meds, wound care, etc.)  - Arrange for interpretive services to assist at discharge as needed  - Refer to Case Management Department for coordinating discharge planning if the patient needs post-hospital services based on physician/advanced practitioner order or complex needs related to functional status, cognitive ability, or social support system  Outcome: Progressing     Problem: Knowledge Deficit  Goal: Patient/family/caregiver demonstrates understanding of disease process, treatment plan, medications, and discharge instructions  Description: Complete learning assessment and assess knowledge base.   Interventions:  - Provide teaching at level of understanding  - Provide teaching via preferred learning methods  Outcome: Progressing     Problem: Prexisting or High Potential for Compromised Skin Integrity  Goal: Skin integrity is maintained or improved  Description: INTERVENTIONS:  - Identify patients at risk for skin breakdown  - Assess and monitor skin integrity  - Assess and monitor nutrition and hydration status  - Monitor labs   - Assess for incontinence   - Turn and reposition patient  - Assist with mobility/ambulation  - Relieve pressure over bony prominences  - Avoid friction and shearing  - Provide appropriate hygiene as needed including keeping skin clean and dry  - Evaluate need for skin moisturizer/barrier cream  - Collaborate with interdisciplinary team   - Patient/family teaching  - Consider wound care consult   Outcome: Progressing

## 2023-11-21 NOTE — ASSESSMENT & PLAN NOTE
21-year-old female presented to the [place] ED as a result of substernal chest pain which radiated to her right arm. There was evidence of elevated troponin levels and she was initially managed as a case of type I NSTEMI. She underwent cardiac catheterization which did not show any evidence of new occlusion. Appreciate cardiology recommendations. Plan for dual antiplatelet therapy for 3 months.  Then aspirin monotherapy  Continue lisinopril, Imdur, Metoprolol, statin

## 2023-11-21 NOTE — PLAN OF CARE
Problem: PAIN - ADULT  Goal: Verbalizes/displays adequate comfort level or baseline comfort level  Description: Interventions:  - Encourage patient to monitor pain and request assistance  - Assess pain using appropriate pain scale  - Administer analgesics based on type and severity of pain and evaluate response  - Implement non-pharmacological measures as appropriate and evaluate response  - Consider cultural and social influences on pain and pain management  - Notify physician/advanced practitioner if interventions unsuccessful or patient reports new pain  11/21/2023 1144 by Ro Santana  Outcome: Progressing  11/21/2023 1045 by Ro Santana  Outcome: Progressing     Problem: SAFETY ADULT  Goal: Patient will remain free of falls  Description: INTERVENTIONS:  - Educate patient/family on patient safety including physical limitations  - Instruct patient to call for assistance with activity   - Consult OT/PT to assist with strengthening/mobility   - Keep Call bell within reach  - Keep bed low and locked with side rails adjusted as appropriate  - Keep care items and personal belongings within reach  - Initiate and maintain comfort rounds  - Make Fall Risk Sign visible to staff  - Apply yellow socks and bracelet for high fall risk patients  - Consider moving patient to room near nurses station  11/21/2023 1144 by Ro Santana  Outcome: Progressing  11/21/2023 1045 by Ro Santana  Outcome: Progressing  Goal: Maintain or return to baseline ADL function  Description: INTERVENTIONS:  -  Assess patient's ability to carry out ADLs; assess patient's baseline for ADL function and identify physical deficits which impact ability to perform ADLs (bathing, care of mouth/teeth, toileting, grooming, dressing, etc.)  - Assess/evaluate cause of self-care deficits   - Assess range of motion  - Assess patient's mobility; develop plan if impaired  - Assess patient's need for assistive devices and provide as appropriate  - Encourage maximum independence but intervene and supervise when necessary  - Involve family in performance of ADLs  - Assess for home care needs following discharge   - Consider OT consult to assist with ADL evaluation and planning for discharge  - Provide patient education as appropriate  11/21/2023 1144 by Gale Shock  Outcome: Progressing  11/21/2023 1045 by Gale Shock  Outcome: Progressing  Goal: Maintains/Returns to pre admission functional level  Description: INTERVENTIONS:  - Perform AM-PAC 6 Click Basic Mobility/ Daily Activity assessment daily.  - Set and communicate daily mobility goal to care team and patient/family/caregiver. - Collaborate with rehabilitation services on mobility goals if consulted  - Perform Range of Motion 4 times a day. - Reposition patient every 2 hours.   - Dangle patient 4 times a day  - Stand patient 4 times a day  - Ambulate patient 4 times a day  - Out of bed to chair 4 times a day   - Out of bed for meals 4 times a day  - Out of bed for toileting  - Record patient progress and toleration of activity level   11/21/2023 1144 by Gale Shock  Outcome: Progressing  11/21/2023 1045 by Gale Shock  Outcome: Progressing     Problem: DISCHARGE PLANNING  Goal: Discharge to home or other facility with appropriate resources  Description: INTERVENTIONS:  - Identify barriers to discharge w/patient and caregiver  - Arrange for needed discharge resources and transportation as appropriate  - Identify discharge learning needs (meds, wound care, etc.)  - Arrange for interpretive services to assist at discharge as needed  - Refer to Case Management Department for coordinating discharge planning if the patient needs post-hospital services based on physician/advanced practitioner order or complex needs related to functional status, cognitive ability, or social support system  11/21/2023 1144 by Gale Shock  Outcome: Progressing  11/21/2023 1045 by Gale Shock  Outcome: Progressing     Problem: Knowledge Deficit  Goal: Patient/family/caregiver demonstrates understanding of disease process, treatment plan, medications, and discharge instructions  Description: Complete learning assessment and assess knowledge base.   Interventions:  - Provide teaching at level of understanding  - Provide teaching via preferred learning methods  11/21/2023 1144 by Paulino Strauss  Outcome: Progressing  11/21/2023 1045 by Paulino Strauss  Outcome: Progressing     Problem: Prexisting or High Potential for Compromised Skin Integrity  Goal: Skin integrity is maintained or improved  Description: INTERVENTIONS:  - Identify patients at risk for skin breakdown  - Assess and monitor skin integrity  - Assess and monitor nutrition and hydration status  - Monitor labs   - Assess for incontinence   - Turn and reposition patient  - Assist with mobility/ambulation  - Relieve pressure over bony prominences  - Avoid friction and shearing  - Provide appropriate hygiene as needed including keeping skin clean and dry  - Evaluate need for skin moisturizer/barrier cream  - Collaborate with interdisciplinary team   - Patient/family teaching  - Consider wound care consult   11/21/2023 1144 by Paulino Strauss  Outcome: Progressing  11/21/2023 1045 by Paulino Strauss  Outcome: Progressing

## 2023-11-21 NOTE — PROGRESS NOTES
Cardiology Progress Note - Dinesh Shore 80 y.o. female MRN: [id number]    Unit/Bed#: E4 -01 Encounter: [id number]      Assessment & Plan:    Non-MI troponin elevation  - Troponin trend 1461->1643->1504->1357->1703->2415  - [facility] 11/20/2023 showed 50% tubular lesion at the site of the prior dissection, otherwise no new occlusions, plan for medical management  -Unclear cause for her troponin elevation, possible microvascular disease   -Plan to do 3 months of aspirin 81 mg daily and Plavix 75 mg daily and then stop Plavix  -TTE 11/20/2023 showed EF 65%, grade 1 DD, mild LA and RA, mild AI    Atherosclerosis of native coronary artery of native heart without angina pectoris  -Prior NSTEMI in April 2018, [facility] at that time showed had a percent distal LAD with dissection-> no intervention performed due to distal vessel dissection and very small size  - TTE/7/2018 showed EF 60%, severe apical hypokinesis, mild MR, mild AI  - On aspirin 81 mg daily prior to admission    Essential hypertension  - Outpatient Rx amlodipine 5 mg daily, and lisinopril/HCTZ 10/12.5 mg daily  - Currently holding lisinopril/HCTZ for [facility] today, plan to resume tomorrow    Hypercholesteremia  -Continue with atorvastatin 40 mg daily    Morbid obesity due to excess calories ([address])      Summary:  - [facility] today showed a 50% tubular lesion at the site of the prior dissection, otherwise no new occlusions, plan for medical management  - Continue with aspirin 81 mg daily, Plavix 75 mg daily and atorvastatin 40 mg daily, can stop Plavix after 3 months  -Will resume lisinopril 10 mg daily and also start Imdur 30 mg daily  - Recommend discontinuing hydrochlorothiazide to prevent hypotension  -No recurrent episodes of chest discomfort, stable from a cardiac standpoint for discharge home today  - Will message office to arrange outpatient follow-up appointment    Subjective:   No significant events overnight.   She reports feeling well this morning has no complaints. Denies any recurrent episodes of chest discomfort overnight, this morning or with ambulation to the bathroom. Also denies shortness of breath, orthopnea, abdominal pain, nausea, vomiting, fever, chills, headache, dizziness or palpitations. Objective:     Vitals: Blood pressure 133/72, pulse 95, temperature 97.6 °F (36.4 °C), temperature source Temporal, resp. rate 18, height 5' 5" (1.651 m), weight 119 kg (262 lb 5.6 oz), SpO2 92 %. , Body mass index is 43.66 kg/m².,   Orthostatic Blood Pressures      Flowsheet Row Most Recent Value   Blood Pressure 133/72 filed at 11/21/2023 0750   Patient Position - Orthostatic VS Lying filed at 11/21/2023 0750              Intake/Output Summary (Last 24 hours) at 11/21/2023 1000  Last data filed at 11/20/2023 1801  Gross per 24 hour   Intake --   Output 200 ml   Net -200 ml             Physical Exam:    GEN: Burgess Saldivar appears well, alert and oriented x 3, pleasant and cooperative   HEENT: Mucous membranes moist, no scleral icterus, no conjunctival pallor  NECK: No elevated JVD  HEART: Regular rate and rhythm, normal S1 and S2, no murmurs or rubs   LUNGS: clear to auscultation bilaterally; no wheezes, rales, or rhonchi   ABDOMEN: normal bowel sounds, soft, no tenderness, no distention  EXTREMITIES: peripheral pulses normal; no lower extremity edema   NEURO: no focal findings   SKIN: No lesions or rashes on exposed skin        Current Facility-Administered Medications:     acetaminophen (TYLENOL) tablet 650 mg, 650 mg, Oral, Q6H PRN, Cyndi Public, CRNP, 650 mg at 11/21/23 0807    albuterol (PROVENTIL HFA,VENTOLIN HFA) inhaler 2 puff, 2 puff, Inhalation, Q6H PRN, Cyndi Public, CRNP    ALPRAZolam Melvi Large) tablet 0.25 mg, 0.25 mg, Oral, TID PRN, Cyndi Public, CRNP, 0.25 mg at 11/19/23 1704    amLODIPine (NORVASC) tablet 5 mg, 5 mg, Oral, Daily, Suzan Coleman CRNP, 5 mg at 11/21/23 0804    aspirin chewable tablet 81 mg, 81 mg, Oral, Daily, Suzan Amandaek, BILLYNP, 81 mg at 11/21/23 0804    atorvastatin (LIPITOR) tablet 40 mg, 40 mg, Oral, QPM, BILLY ShinNP, 40 mg at 11/20/23 1700    clopidogrel (PLAVIX) tablet 75 mg, 75 mg, Oral, Daily, Dianna Curtis MD, 75 mg at 11/21/23 0804    fluticasone (FLONASE) 50 mcg/act nasal spray 1 spray, 1 spray, Nasal, Daily PRN, Alver Sinks, CRNP    isosorbide mononitrate (IMDUR) 24 hr tablet 30 mg, 30 mg, Oral, Daily, Dianna Curtis MD    lisinopril (ZESTRIL) tablet 10 mg, 10 mg, Oral, Daily, Dianna Curtis MD, 10 mg at 11/21/23 0844    loratadine (CLARITIN) tablet 10 mg, 10 mg, Oral, Daily PRN, Alver Sinks, CRNP    metoprolol succinate (TOPROL-XL) 24 hr tablet 100 mg, 100 mg, Oral, Daily, Suzan Coleman, BILLYNP, 100 mg at 11/21/23 0804    morphine injection 2 mg, 2 mg, Intravenous, Q4H PRN, Alver Sinks, CRNP, 2 mg at 11/18/23 2103    nitroglycerin (NITROSTAT) SL tablet 0.4 mg, 0.4 mg, Sublingual, Q5 Min PRN, Alver Sinks, CRNP, 0.4 mg at 11/18/23 1802    ondansetron (ZOFRAN) injection 4 mg, 4 mg, Intravenous, Q6H PRN, Alver Sinks, CRNP, 4 mg at 11/19/23 1940    oxyCODONE (ROXICODONE) IR tablet 5 mg, 5 mg, Oral, Q4H PRN, Alver Sinks, CRNP, 5 mg at 11/20/23 1659    pantoprazole (PROTONIX) EC tablet 40 mg, 40 mg, Oral, Early Morning, Suzan Carrion, CRNP, 40 mg at 11/21/23 0629    Labs & Results:    Lab Results   Component Value Date    TROPONINI <0.02 01/13/2020    TROPONINI 7.40 (H) 04/06/2018    TROPONINI 6.31 (H) 04/05/2018       Lab Results   Component Value Date    GLUCOSE 104 01/23/2015    CALCIUM 8.9 11/21/2023     01/23/2015    K 3.8 11/21/2023    CO2 31 11/21/2023     11/21/2023    BUN 11 11/21/2023    CREATININE 0.60 11/21/2023       Lab Results   Component Value Date    WBC 10.26 (H) 11/21/2023    HGB 12.9 11/21/2023    HCT 38.8 11/21/2023    MCV 88 11/21/2023     11/21/2023     Results from last 7 days   Lab Units 11/18/23  0947   INR  1.04 Lab Results   Component Value Date    CHOL 182 01/23/2015     Lab Results   Component Value Date    HDL 46 (L) 11/02/2022    HDL 45 11/18/2020     Lab Results   Component Value Date    LDLCALC 82 11/02/2022    LDLCALC 61 11/18/2020     Lab Results   Component Value Date    TRIG 178 (H) 11/02/2022    TRIG 175 (H) 11/18/2020       Lab Results   Component Value Date    ALT 10 11/18/2023    AST 21 11/18/2023    ALKPHOS 92 11/18/2023         EKG personally reviewed by )Darío Henson MD. No acute changes   TELE: No significant arrhythmias seen on telemetry review.

## 2023-11-21 NOTE — DISCHARGE SUMMARY
[facility]  Discharge- Luis Antonio Bold 1955, 80 y.o. female MRN: [id number]  Unit/Bed#: E4 -01 Encounter: [id number]  Primary Care Provider: Ainsley Lemus DO   Date and time admitted to hospital: 11/18/2023  3:49 PM    * Elevated troponin level not due myocardial infarction  Assessment & Plan  52-year-old female presented to the [facility] ED as a result of substernal chest pain which radiated to her right arm. There was evidence of elevated troponin levels and she was initially managed as a case of type I NSTEMI. She underwent cardiac catheterization which did not show any evidence of new occlusion. Appreciate cardiology recommendations. Plan for dual antiplatelet therapy for 3 months. Then aspirin monotherapy  Continue lisinopril, Imdur, Metoprolol, statin    Hypercholesteremia  Assessment & Plan  Continue statin    Morbid obesity due to excess calories ([address])  Assessment & Plan  Encourage lifestyle modifications    Essential hypertension  Assessment & Plan  Controlled  Continue amlodipine, lisinopril, imdur, and metoprolol      Discharging Physician / Practitioner: Yvonne Del Cid MD  PCP: Ainsley Lemus DO  Admission Date:   Admission Orders (From admission, onward)       Ordered        11/18/23 1646  Inpatient Admission  Once                          Discharge Date: 11/21/23    Medical Problems       Resolved Problems  Date Reviewed: 11/21/2023   None         Consultations During Hospital Stay:  cardiology    Procedures Performed:   Cardiac catheterization:      Left Anterior Descending The vessel was visualized by angiography and is moderate in size. Mid LAD lesion is 50% stenosed. Not the culprit lesion. The vessel size is medium. THEO flow is 3. The lesion is tubular. This is the site of previous dissection in 4/2018. The mid LAD was occluded at that time. THEO 3 flow is now present. The dissected area in the mid LAD appears healed.  The distal LAD now has THEO 3 flow, small vessel, normal.    Right Coronary Artery The vessel was visualized by angiography, is angiographically normal and dominant. Very large. Left Circumflex The vessel was visualized by angiography, is small, is angiographically normal and non-dominant. Recommendation    1. ASA 81 mg daily  2. Plavix 75 mg daily x 3 months  3. Strict BP and cholesterol control  4. F/Up with [facility]       Significant Findings / Test Results:   Imaging  Echo      Left Ventricle: Left ventricular cavity size is normal. Wall thickness is mildly increased. The left ventricular ejection fraction is %. Systolic function is normal. Wall motion is normal. Diastolic function is mildly abnormal, consistent with grade I (abnormal) relaxation. Left Atrium: The atrium is mildly dilated. Right Atrium: The atrium is mildly dilated. Aortic Valve: There is mild regurgitation. Xray    No acute cardiopulmonary disease. Incidental Findings:   As written above     Test Results Pending at Discharge (will require follow up):   none     Outpatient Tests Requested:  Pcp, cardiology  Cbc, bmp    Complications:  none    Reason for Admission: chest pain    Hospital Course: Marcellus Oleary is a 80 y.o. female patient who originally presented to the hospital on 11/18/2023 due to chest pain. Patient is a 72-year-old female with history of hypertension, hyperlipidemia, GERD, CAD status post cardiac catheterization presented to our network as a result of her chest pain. She had elevated troponin levels. Initially, there was concern for NSTEMI. However, cardiac catheterization did not show any evidence of an occlusive vessel. At this time cardiology suspects that this is likely due to microvascular disease. Patient continues to remain chest pain-free. She was cleared by cardiology for discharge. They want dual antiplatelet therapy for at least 3 months.   She will also be discharged with metoprolol, lisinopril, Imdur. Hydrochlorothiazide will be discontinued. Patient agrees to follow-up with her providers as scheduled and to take her medications as prescribed. All questions were addressed. she understood the need to seek immediate medical attention should she develop any chest pain, shortness of breath, severe pain, fever, chills, or any other concerning symptoms. Please see above list of diagnoses and related plan for additional information. Condition at Discharge: fair     Discharge Day Visit / Exam:     Subjective: Patient seen and examined at bedside. Comfortable with no new issues or complaints overnight. Denies any chest pain shortness of breath. Vitals: Blood Pressure: 133/72 (11/21/23 0750)  Pulse: 95 (11/21/23 0750)  Temperature: 97.6 °F (36.4 °C) (11/21/23 0750)  Temp Source: Temporal (11/21/23 0750)  Respirations: 18 (11/21/23 0750)  Height: 5' 5" (165.1 cm) (11/20/23 1545)  Weight - Scale: 119 kg (262 lb 5.6 oz) (11/21/23 0600)  SpO2: 92 % (11/21/23 0750)  Exam:   Physical Exam  Vitals reviewed. Constitutional:       General: She is not in acute distress. HENT:      Head: Normocephalic. Nose: Nose normal.      Mouth/Throat:      Mouth: Mucous membranes are moist.   Eyes:      General: No scleral icterus. Cardiovascular:      Rate and Rhythm: Normal rate. Pulmonary:      Effort: Pulmonary effort is normal. No respiratory distress. Abdominal:      General: There is no distension. Palpations: Abdomen is soft. Tenderness: There is no abdominal tenderness. Skin:     General: Skin is warm. Neurological:      Mental Status: She is alert and oriented to person, place, and time. Psychiatric:         Mood and Affect: Mood normal.         Behavior: Behavior normal.       Discussion with Family:     Discharge instructions/Information to patient and family:   See after visit summary for information provided to patient and family.       Provisions for Follow-Up Care:  See after visit summary for information related to follow-up care and any pertinent home health orders. Disposition:     Home    Planned Readmission: No     Discharge Statement:  I spent 37 minutes discharging the patient. This time was spent on the day of discharge. I had direct contact with the patient on the day of discharge. Greater than 50% of the total time was spent examining patient, answering all patient questions, arranging and discussing plan of care with patient as well as directly providing post-discharge instructions. Additional time then spent on discharge activities. Discharge Medications:  See after visit summary for reconciled discharge medications provided to patient and family.       ** Please Note: This note has been constructed using a voice recognition system **

## 2023-12-08 ENCOUNTER — CLINICAL SUPPORT (OUTPATIENT)
Dept: CARDIAC REHAB | Facility: HOSPITAL | Age: 68
End: 2023-12-08
Attending: STUDENT IN AN ORGANIZED HEALTH CARE EDUCATION/TRAINING PROGRAM
Payer: MEDICARE

## 2023-12-08 DIAGNOSIS — I21.4 NSTEMI (NON-ST ELEVATED MYOCARDIAL INFARCTION) (HCC): Primary | ICD-10-CM

## 2023-12-08 PROCEDURE — 93797 PHYS/QHP OP CAR RHAB WO ECG: CPT

## 2023-12-08 NOTE — PROGRESS NOTES
CARDIAC REHAB ASSESSMENT    Today's date: 2023  Patient name: Curlie Baumgarten     : 1955       MRN: [id number]  PCP: Jose Davis DO  Referring Physician: Milagros Peña MD  Cardiologist: Dr. José Miguel Mcbride  Surgeon: n/a  Dx:   Encounter Diagnosis   Name Primary? NSTEMI (non-ST elevated myocardial infarction) [facility])        Date of onset: 2023  Cultural needs: n/a    Weight    Wt Readings from Last 1 Encounters:   23 119 kg (262 lb 5.6 oz)      Height:   Ht Readings from Last 1 Encounters:   23 5' 5" (1.651 m)     Medical History:   Past Medical History:   Diagnosis Date    Anxiety     Coronary artery disease     GERD (gastroesophageal reflux disease)     Hypertension     MI, old     Migraine          Physical Limitations: b/l knee pain, back pain    Fall Risk: Low   Comments: Ambulates with a steady gait with no assist device    Anginal Equivalent: Chest Pressure   NTG use: No prescription    Risk Factors   Cholesterol: Yes  Smoking: Never used  HTN: Yes  DM: No  Obesity: Yes   Inactivity: Yes  Stress:  perceived  stress: 8/10   Stressors:family   Goals for Stress Management:Practice Relaxation Techniques, Read, Keep a positive mindset, and Enjoy a hobby    Family History:No family history on file. Allergies: Patient has no known allergies.   ETOH:   Social History     Substance and Sexual Activity   Alcohol Use Yes    Comment: socially         Current Medications:   Current Outpatient Medications   Medication Sig Dispense Refill    albuterol (PROVENTIL HFA,VENTOLIN HFA) 90 mcg/act inhaler Inhale 2 puffs as needed      ALPRAZolam (XANAX) 0.25 mg tablet Take 1 tablet (0.25 mg total) by mouth 3 (three) times a day as needed for anxiety for up to 10 days 6 tablet 0    amLODIPine (NORVASC) 5 mg tablet Take 1 tablet (5 mg total) by mouth daily 30 tablet 1    aspirin 81 mg chewable tablet Chew 1 tablet (81 mg total) daily 90 tablet 0    atorvastatin (LIPITOR) 40 mg tablet Take 1 tablet (40 mg total) by mouth every evening 30 tablet 1    b complex vitamins tablet Take 1 tablet by mouth daily      cetirizine (ZYRTEC ALLERGY) 10 mg tablet Take 1 tablet by mouth daily as needed      clopidogrel (PLAVIX) 75 mg tablet Take 1 tablet (75 mg total) by mouth daily Do not start before November 22, 2023. 90 tablet 0    co-enzyme Q-10 30 MG capsule Take 100 mg by mouth 3 (three) times a day       cyanocobalamin (VITAMIN B-12) 100 mcg tablet Take by mouth daily      DULoxetine (CYMBALTA) 20 mg capsule 80 mg daily  (Patient not taking: Reported on 11/18/2023)      fluticasone (FLONASE) 50 mcg/act nasal spray 1 spray into each nostril daily      isosorbide mononitrate (IMDUR) 30 mg 24 hr tablet Take 1 tablet (30 mg total) by mouth daily 30 tablet 0    lisinopril (ZESTRIL) 10 mg tablet Take 1 tablet (10 mg total) by mouth daily Do not start before November 22, 2023. 30 tablet 0    metoprolol succinate (TOPROL-XL) 100 mg 24 hr tablet Take 100 mg by mouth daily        nitroglycerin (NITROSTAT) 0.4 mg SL tablet Place 1 tablet (0.4 mg total) under the tongue every 5 (five) minutes as needed for chest pain 25 tablet 1    NON FORMULARY       omeprazole (PRILOSEC OTC) 20 MG tablet Take 1 tablet by mouth daily       No current facility-administered medications for this visit.          Functional Status Prior to Diagnosis for Treatment   Occupation: retired  Recreation: none  ADL’s: No limitations  [place]: No limitations  Exercise: no regular aerobic exercise  Other: n/a    Current Functional Status  Occupation: retired  Recreation: none  ADL’s:No limitations  [place]: No limitations  Exercise: has started walking 2-3x/day with her neighbor since MI  Other: n/a    Patient Specific Goals:  establish better eating habits by focusing on eating 3 meals a day and decreasing fat and sodium intake, establish a regular exercise program to help increase strength and endurance and to lose weight with short term goal being -10-15 lbs    Short Term Program Goals: dietary modifications increased strength improved energy/stamina with ADLs exercise 120-150 mins/wk wt loss 1-2 ppw    Long Term Goals: increased maximal walking duration  increased intial training workload  Improved [facility] Activity Status score  Improved functional capacity based on initial fitness assessment  improved exercise tolerance  Improved Quality of Life - [facility] score reduced  Improved lipid profile  Reduced stress  weight loss goal of -20 lbs  improved Rate Your Plate Score    Ability to reach goals/rehabilitation potential:  Very Good     Projected return to function: 12 weeks  Objective tests: sub-max TM ETT      Nutritional   Reviewed details of Rate your Plate. Discussed key elements of heart healthy eating. Reviewed patient goals for dietary modifications and their clinical implications. Reviewed most recent lipid profile.      Goals for dietary modification based on Rate Your Plate Dietary Assessment:  choose lean cuts of meat  poultry without the skin  low fat ground meat and poultry  eliminate processed meats  reduce portions of meat to 3 oz  increase fish intake  more meatless meals  low fat dairy   reduced fat cheese  increase whole grains  increase fruits and vegetables  eliminate butter  low sodium  improved snack choices  more nuts/seeds  reduce sweets/frozen desserts  heathier choices while dining out  eat smaller, more frequent meals      Emotional/Social  Patient reports they are coping well with good social support and denies depression or anxiety  Patient reports feelings of depression   Patient reports feelings of anxiety  Reports sufficient emotional support from  and family    Marital status:     Domestic Violence Screening: No    Comments: n/a

## 2023-12-08 NOTE — PROGRESS NOTES
Cardiac Rehabilitation Plan of Care   Initial Care Plan          Today's date: 2023   # of Exercise Sessions Completed: 1-evaluation  Patient name: Hasmukh Willis      : 1955  Age: 80 y.o. MRN: [id number]  Referring Physician: Javier Youngblood MD  Cardiologist: Dr. Carlos Garcia  Provider: Roberto Taylor  Clinician: Chris Welch, MS, CEP      Dx:   Encounter Diagnosis   Name Primary? NSTEMI (non-ST elevated myocardial infarction) [facility])      Date of onset: 2023      SUMMARY OF PROGRESS:  Mrs. Loulou Cantu is here today for her cardiac rehabilitation evaluation after recent MI. She reports having previous MI in 2018 also. She had recent catherization done showing no major blockages and is being medically managed at this time. Her cardiac risk factors include: HTN, hypercholesterolemia,overweight, and inactivity. She reports she has been very inactive over the past couple of years and has been having a very hard year after the death of her son from cancer. She had been taking anti-depressants, but stopped at time of recent MI. She feels she is feeling really good at this time and is not sure if it is due to being off of antidepressant. She is feeling she has more energy and wanting to do more around her house. She has also started walking 2-3x/day with her neighbor since MI. Her goals for her rehab program are to  establish better eating habits by focusing on eating 3 meals a day and decreasing fat and sodium intake, establish a regular exercise program to help increase strength and endurance and to lose weight with short term goal being -10-15 lbs. She reports not following a great diet and eating plan due to only eating two meals a day-breakfast and dinner. (Small breakfast and dinner late at night 8 or 9:00 when her  gets home from work).  She would like to learn more about healthy eating and label reading through education sessions at cardiac rehab, but also may be interested in meeting with dietician for more information to help with eating habits. She reports she has had feelings of depression and cries a lot now that she is not taking anti-depressant. PHQ-9=2. She also reports some anxiety (HOLLEY-7=4). She reports she has excellent support from her  and family. She rates stress high worrying about her grandaughter who lost her father and grieving from loss of her son. Provided information on [place] and [address] [place] [address]. She completed TM ETT today reaching 3.1 METs at 5 min. She tolerated exercise well and showed normal hemodynamic response to exercise. Telemetry shows NSR with PVCs. Will progress exercise times and intensities as she tolerates over first 30 days of rehab. She is in agreement to try attending CR 3x/week, but may need to only do 2x/week some weeks. She plans to participate in weekly education sessions on cardiac risk factor management. She will start her sessions on 2023.       Medication compliance: Yes   Comments: Pt reports to be compliant with medications  Fall Risk: Low   Comments: Ambulates with a steady gait with no assist device    EKG Interpretation: NST, PVCs      EXERCISE ASSESSMENT and PLAN    Exercise Prescription:      Frequency: 3 days/week   Supplement with home exercise 2+ days/wk as tolerated       Minutes: 45-55         METS: 2.5-3.5            HR: 30 beats above resting   RPE: 4-6         Modalities: Treadmill, UBE, NuStep, and Recumbent bike      30 Day Goals for Exercise Progression:    Frequency: 3 days/week of cardiac rehab       Supplement with home exercise 2+ days/wk as tolerated    Minutes: 30-40                              >150 mins/wk of moderate intensity exercise   METS: 2.0-3.0   HR: 30 beats above resting    RPE: 4-6   Modalities: Treadmill, UBE, NuStep, and Recumbent bike    Strength trainin-3 days / week  12-15 repetitions  1-2 sets per modality   Will be added following 2-3 weeks of monitored exercise sessions   Modalities: Pull Downs, Lateral Raise, Arm Extension, Arm Curl, Sit to [employer], and [employer] Exercise: Type: walking , Frequency: 3-4 days/week    Goals: Reduced dyspnea with physical activity  0-1/10, improved DASI score by 10%, Increase in exercise capacity by 40% - based on peak METs tolerated in cardiac rehab exercise session, Exercise 5 days/wk, >150mins/wk of moderate intensity exercise, Resume ADLs with increased strength, Attend Rehab regularly, Decrease sitting time, and Start a walking program    Progression Toward Goals:  Reviewed Pt goals and determined plan of care, Will continue to educate and progress as tolerated. Education: benefit of exercise for CAD risk factors, home exercise guidelines, AHA guidelines to achieve >150 mins/wk of moderate exercise, RPE scale, and class: Risk Factors for Heart Disease   Plan:education on home exercise guidelines, home exercise 30+ mins 2 days opposite CR, and Education class: Risk Factors for Heart Disease  Readiness to change: Preparation:  (Getting ready to change)       NUTRITION ASSESSMENT AND PLAN    Weight control:    Starting weight: 260.0 lb   Current weight:         Diabetes: N/A  A1c: 6.3    last measured: 11/2/2023    Lipid management: Discussed diet and lipid management and Last lipid profile 11/02/2023  Chol 164    HDL 46  LDL 82    Goals:LDL <100, HDL >40, TRG <150, CHOL <200, Improved Rate Your Plate score  >85, Wt. loss 1 ppw,  goal of -10-15 lbs., and 2.5-5%  wt loss    Measurable goals were based Rate Your Plate Dietary Self-Assessment. These are the areas in which the patient could score higher on the assessment. Goals include recommendations for a heart healthy diet based on American Heart Association. Progression Toward Goals: Reviewed Pt goals and determined plan of care, Will continue to educate and progress as tolerated.     Education: heart healthy eating  low sodium diet  hydration  nutrition for  lipid management  target goal for A1c <7.0  wt. loss   portion control  education class: Heart Healthy Eating  education class:  Label Reading  Plan: Education class: Reading Food Labels, Education Class: Heart Healthy Eating, refer to diabetes educator, refer to medical nutrition therapy, increase intake of whole grains, replace refined flours with whole grains, increase daily intake of fruits and vegetables, increase daily intake of low fat dairy, limit meat intake to less than 6oz per day, choose healthy meals while dining out, eat red meat once a week or less, choose lean red meat, reduce intake and /or  rarely eat processed meats , eat poultry without the skin, eat more meatless meals, drink/use 1%  low fat or skim  milk, eat reduced-fat or part-skim cheese or rarely eat, rarely eat frozen desserts, cook without fats or oils, and eat healthy snacks like fruit, pretzels, and low fat crackers  Readiness to change: Preparation:  (Getting ready to change)       PSYCHOSOCIAL ASSESSMENT AND PLAN    Emotional:  Depression assessment:  PHQ-9 = 2  1-4 = Minimal Depression            Anxiety measure:  HOLLEY-7 = 4  0-4  = Not anxious  Self-reported stress level:  8  Social support: Excellent and Patient reports excellent emotional/social support from  and family    Goals:  Reduce perceived stress to 1-3/10, Feelings in [place] Score < 3, Physical Fitness in [place] Score < 3, Pain in [place] Score < 3, improved concentration, improved positive thoughts of well being, increased energy, and stop worrying    Progression Toward Goals: Reviewed Pt goals and determined plan of care, Will continue to educate and progress as tolerated.     Education: signs/sxs of depression, benefits of a positive support system, stress management techniques, benefits of enrolling in [employer], and depression and CAD  Plan: Class: Stress and Your Health, Class: Relaxation, Enroll in [employer], Practice relaxation techniques, Exercise, and Keep a positive mindset  Readiness to change: Preparation:  (Getting ready to change)       OTHER CORE COMPONENTS     Tobacco:   Social History     Tobacco Use   Smoking Status Never   Smokeless Tobacco Never       Tobacco Use Intervention:   N/A:  Patient is a non-smoker     Anginal Symptoms:  chest pressure   NTG use: No prescription    Blood pressure:    Restin/72   Exercise: 138/72    Goals: consistent BP < 130/80, reduced dietary sodium <2300mg, moderate intensity exercise >150 mins/wk, medication compliance, and reduce number of medications  needed for BP control    Progression Toward Goals: Reviewed Pt goals and determined plan of care, Will continue to educate and progress as tolerated.     Education:  understanding high blood pressure and it's relationship to CAD and low sodium diet and HTN  Plan: Class: Understanding Heart Disease, Class: Common Heart Medications, and medication compliance  Readiness to change: Action:  (Changing behavior)

## 2023-12-12 ENCOUNTER — APPOINTMENT (OUTPATIENT)
Dept: CARDIAC REHAB | Facility: HOSPITAL | Age: 68
End: 2023-12-12
Payer: MEDICARE

## 2023-12-14 ENCOUNTER — APPOINTMENT (OUTPATIENT)
Dept: CARDIAC REHAB | Facility: HOSPITAL | Age: 68
End: 2023-12-14
Payer: MEDICARE

## 2023-12-15 ENCOUNTER — APPOINTMENT (OUTPATIENT)
Dept: CARDIAC REHAB | Facility: HOSPITAL | Age: 68
End: 2023-12-15
Payer: MEDICARE

## 2023-12-19 ENCOUNTER — APPOINTMENT (OUTPATIENT)
Dept: CARDIAC REHAB | Facility: HOSPITAL | Age: 68
End: 2023-12-19
Payer: MEDICARE

## 2023-12-19 ENCOUNTER — TELEPHONE (OUTPATIENT)
Dept: CARDIAC REHAB | Facility: HOSPITAL | Age: 68
End: 2023-12-19

## 2023-12-19 NOTE — TELEPHONE ENCOUNTER
LM regarding No show to 1st exercise session for cardiac rehab. Offered to come in later if she would like.

## 2023-12-21 ENCOUNTER — APPOINTMENT (OUTPATIENT)
Dept: CARDIAC REHAB | Facility: HOSPITAL | Age: 68
End: 2023-12-21
Payer: MEDICARE

## 2023-12-22 ENCOUNTER — APPOINTMENT (OUTPATIENT)
Dept: CARDIAC REHAB | Facility: HOSPITAL | Age: 68
End: 2023-12-22
Payer: MEDICARE

## 2023-12-26 ENCOUNTER — APPOINTMENT (OUTPATIENT)
Dept: CARDIAC REHAB | Facility: HOSPITAL | Age: 68
End: 2023-12-26
Payer: MEDICARE

## 2023-12-28 ENCOUNTER — APPOINTMENT (OUTPATIENT)
Dept: CARDIAC REHAB | Facility: HOSPITAL | Age: 68
End: 2023-12-28
Payer: MEDICARE

## 2023-12-29 ENCOUNTER — APPOINTMENT (OUTPATIENT)
Dept: CARDIAC REHAB | Facility: HOSPITAL | Age: 68
End: 2023-12-29
Payer: MEDICARE

## 2024-02-22 DIAGNOSIS — I21.4 NSTEMI (NON-ST ELEVATED MYOCARDIAL INFARCTION) (HCC): ICD-10-CM

## 2024-02-22 RX ORDER — CLOPIDOGREL BISULFATE 75 MG/1
75 TABLET ORAL DAILY
Qty: 90 TABLET | Refills: 0 | Status: SHIPPED | OUTPATIENT
Start: 2024-02-22 | End: 2024-05-22

## 2024-04-01 ENCOUNTER — OFFICE VISIT (OUTPATIENT)
Dept: CARDIOLOGY CLINIC | Facility: CLINIC | Age: 69
End: 2024-04-01
Payer: MEDICARE

## 2024-04-01 VITALS
DIASTOLIC BLOOD PRESSURE: 80 MMHG | SYSTOLIC BLOOD PRESSURE: 122 MMHG | HEIGHT: 65 IN | BODY MASS INDEX: 45.55 KG/M2 | HEART RATE: 93 BPM | WEIGHT: 273.4 LBS

## 2024-04-01 DIAGNOSIS — I10 ESSENTIAL HYPERTENSION: ICD-10-CM

## 2024-04-01 DIAGNOSIS — E78.00 HYPERCHOLESTEREMIA: ICD-10-CM

## 2024-04-01 DIAGNOSIS — I25.10 ATHEROSCLEROSIS OF NATIVE CORONARY ARTERY OF NATIVE HEART WITHOUT ANGINA PECTORIS: Primary | ICD-10-CM

## 2024-04-01 PROCEDURE — 99214 OFFICE O/P EST MOD 30 MIN: CPT | Performed by: INTERNAL MEDICINE

## 2024-04-01 NOTE — PROGRESS NOTES
Cardiology Follow Up    Sari Mendez  1955  [id number]  [facility] [place]  [address] [facility]  [facility] [address] [phone]    1. Atherosclerosis of native coronary artery of native heart without angina pectoris  Comprehensive metabolic panel    Lipid Panel With Direct LDL    CBC (Includes Diff/Plt) (Refl)    TSH, 3rd generation with Free T4 reflex      2. Hypercholesteremia  TSH, 3rd generation with Free T4 reflex      3. Essential hypertension              Discussion/Summary:     1. CAD - Sari had a NSTEMI in April of 2018 that showed a distal LAD occlusion with focal dissection.  This was not amenable to intervention.  She had done well for years but then had another NSTEMI in November 2023.  Fortunately cardiac catheterization did not show any significant change.  She is now on dual antiplatelet therapy, Imdur and Norvasc, along with beta-blocker and statin.  Due to some fatigue she now takes the metoprolol and lisinopril at night which is a good idea.  She is without symptoms of angina.  She tried cardiac rehabilitation but was unable to tolerate this.  She is going to try to get more active in the warmer months.  We will see her back in 6 months.     2. Hypertension - Well controlled.  She should periodically have her blood pressure checked at home.     3. Hypercholesterolemia - Her last LDL was 82 and she is overdue for blood work.  I ordered a lipid profile but I also ordered general blood work given her fatigue.        Interval History:      Sari comes in for follow-up today given her coronary artery disease history.  She was initially diagnosed with CAD in April 2018.  She had an NSTEMI in which she presented with chest pain and anterior T-wave inversions on her ECG.  She was found to have a distal LAD occlusion with a focal dissection.  This was not amendable to intervention, as this was a small  distal vessel and she was treated medically.  Her LV function was normal on echocardiogram with an apical regional wall motion abnormality.  She was doing well for years and I actually have not seen her in close to 3 years, and that was a virtual visit in April 2021.    This past November Luly came back to the hospital with stuttering episodes of chest with radiating into her right arm.  Her initial episode resolved spontaneously, but then it came back a day later or so therefore she came into the ER for evaluation.  Her ECG did again show transient anterior T wave inversions and she was found to have an elevated troponin.  She went for another cardiac catheterization that did not show any change.  This distal LAD occlusion was still present without any progression of CAD.  Echocardiogram showed normal LV systolic function without significant changes from prior.  At that point she was placed on Plavix in addition to her aspirin and started on Imdur 30 mg daily.  The HCTZ portion of her lisinopril was dropped.     Luly has felt well since.  She tried to do cardiac rehabilitation but was unable to because of arthritis in her back and knee, and difficulties with her gait.  She denies any return of chest or arm pain.  No other symptoms of angina.  She does pappas with chronic fatigue which has worsened somewhat since her MI.  She does have obstructive sleep apnea but was never able to tolerate CPAP.  She does have some on and off shortness of breath, mostly due to deconditioning.  No other signs or symptoms of CHF.  No palpitations, lightheadedness or syncope.         Patient Active Problem List   Diagnosis    Essential hypertension    Old myocardial infarction    Morbid obesity due to excess calories (HCC)    Anxiety    Atherosclerosis of native coronary artery of native heart without angina pectoris    Hypercholesteremia    Pain, joint, ankle and foot, left    Closed fracture of distal end of left fibula     Elevated troponin level not due myocardial infarction     Past Medical History:   Diagnosis Date    Anxiety     Coronary artery disease     GERD (gastroesophageal reflux disease)     Hypertension     MI, old     Migraine      Social History     Socioeconomic History    Marital status: /Civil Union     Spouse name: Not on file    Number of children: Not on file    Years of education: Not on file    Highest education level: Not on file   Occupational History    Not on file   Tobacco Use    Smoking status: Never    Smokeless tobacco: Never   Vaping Use    Vaping status: Never Used   Substance and Sexual Activity    Alcohol use: Yes     Comment: socially    Drug use: Yes     Types: Marijuana     Comment: medical - anxiety-  drops    Sexual activity: Yes     Birth control/protection: Female Sterilization     Comment: hysterectomy   Other Topics Concern    Not on file   Social History Narrative    Not on file     Social Determinants of Health     Financial Resource Strain: Not on file   Food Insecurity: Not on file   Transportation Needs: Not on file   Physical Activity: Not on file   Stress: Not on file   Social Connections: Not on file   Intimate Partner Violence: Not on file   Housing Stability: Not on file      History reviewed. No pertinent family history.  Past Surgical History:   Procedure Laterality Date    BACK SURGERY      CARDIAC CATHETERIZATION N/A 11/20/2023    Procedure: Cardiac Coronary Angiogram;  Surgeon: Haja Harrington MD;  Location: [facility];  Service: Cardiology    HYSTERECTOMY      LUMBAR DISCECTOMY         Current Outpatient Medications:     albuterol (PROVENTIL HFA,VENTOLIN HFA) 90 mcg/act inhaler, Inhale 2 puffs as needed, Disp: , Rfl:     ALPRAZolam (XANAX) 0.25 mg tablet, Take 1 tablet (0.25 mg total) by mouth 3 (three) times a day as needed for anxiety for up to 10 days, Disp: 6 tablet, Rfl: 0    amLODIPine (NORVASC) 5 mg tablet, Take 1 tablet (5 mg total) by mouth daily,  Disp: 30 tablet, Rfl: 1    aspirin 81 mg chewable tablet, Chew 1 tablet (81 mg total) daily, Disp: 90 tablet, Rfl: 0    atorvastatin (LIPITOR) 40 mg tablet, Take 1 tablet (40 mg total) by mouth every evening, Disp: 30 tablet, Rfl: 1    cetirizine (ZYRTEC ALLERGY) 10 mg tablet, Take 1 tablet by mouth daily as needed, Disp: , Rfl:     clopidogrel (PLAVIX) 75 mg tablet, Take 1 tablet (75 mg total) by mouth daily, Disp: 90 tablet, Rfl: 0    co-enzyme Q-10 30 MG capsule, Take 100 mg by mouth 3 (three) times a day , Disp: , Rfl:     fluticasone (FLONASE) 50 mcg/act nasal spray, 1 spray into each nostril daily, Disp: , Rfl:     isosorbide mononitrate (IMDUR) 30 mg 24 hr tablet, Take 1 tablet (30 mg total) by mouth daily, Disp: 30 tablet, Rfl: 0    lisinopril (ZESTRIL) 10 mg tablet, Take 1 tablet (10 mg total) by mouth daily Do not start before November 22, 2023., Disp: 30 tablet, Rfl: 0    metoprolol succinate (TOPROL-XL) 100 mg 24 hr tablet, Take 100 mg by mouth daily  , Disp: , Rfl:     nitroglycerin (NITROSTAT) 0.4 mg SL tablet, Place 1 tablet (0.4 mg total) under the tongue every 5 (five) minutes as needed for chest pain, Disp: 25 tablet, Rfl: 1    NON FORMULARY, , Disp: , Rfl:     omeprazole (PRILOSEC OTC) 20 MG tablet, Take 1 tablet by mouth daily, Disp: , Rfl:   No Known Allergies    Labs:  Lab Results   Component Value Date     01/23/2015    K 3.8 11/21/2023    K 3.6 01/23/2015     11/21/2023    CL 99 (L) 01/23/2015    CO2 31 11/21/2023    CO2 33 01/23/2015    BUN 11 11/21/2023    BUN 20 01/23/2015    CREATININE 0.60 11/21/2023    CREATININE 0.81 01/23/2015    GLUCOSE 104 01/23/2015    CALCIUM 8.9 11/21/2023    CALCIUM 9.3 01/23/2015     Lab Results   Component Value Date    CHOL 182 01/23/2015    TRIG 178 (H) 11/02/2022    TRIG 133 01/23/2015    HDL 46 (L) 11/02/2022    HDL 40 01/23/2015   LDL 59      Imaging:  Sinus rhythm with poor R-wave progression    ECHO(4/2018):  LEFT VENTRICLE:  Systolic  "function was normal. Ejection fraction was estimated to be 60 %.  There was severe hypokinesis of the apical wall(s).  Wall thickness was at the upper limits of normal.     MITRAL VALVE:  There was mild regurgitation.     AORTIC VALVE:  There was mild regurgitation.     TRICUSPID VALVE:  There was trace regurgitation.      CARDIAC CATH(4/2018):  CORONARY CIRCULATION:  Distal LAD: There was a 100 % stenosis. There was THEO grade 1 flow through the vessel (slow flow without perfusion). This lesion is a likely culprit for the patient's recent myocardial infarction. secondary to dissection     CARDIAC STRUCTURES:  Analysis of regional contractile function demonstrated apical akinesis.  Global left ventricular function was hypercontractile. EF calculated by contrast ventriculography was 75 %.      Review of Systems:  Review of Systems   Constitutional: Negative.    HENT: Negative.     Eyes: Negative.    Respiratory: Negative.     Cardiovascular: Negative.    Gastrointestinal: Negative.    Musculoskeletal:  Positive for arthralgias, back pain and gait problem.   Skin: Negative.    Allergic/Immunologic: Negative.    Hematological: Negative.    Psychiatric/Behavioral: Negative.     All other systems reviewed and are negative.    Vitals:    04/01/24 0916   BP: 122/80   BP Location: Left arm   Patient Position: Sitting   Cuff Size: Standard   Pulse: 93   Weight: 124 kg (273 lb 6.4 oz)   Height: 5' 5\" (1.651 m)       Physical Exam  Vitals and nursing note reviewed.   Constitutional:       Appearance: She is well-developed.   HENT:      Head: Normocephalic and atraumatic.   Eyes:      General: No scleral icterus.        Right eye: No discharge.         Left eye: No discharge.      Pupils: Pupils are equal, round, and reactive to light.   Neck:      Thyroid: No thyromegaly.      Vascular: No JVD.   Cardiovascular:      Rate and Rhythm: Normal rate and regular rhythm. No extrasystoles are present.     Pulses: Normal pulses. No " decreased pulses.      Heart sounds: Normal heart sounds, S1 normal and S2 normal. No murmur heard.     No friction rub. No gallop.   Pulmonary:      Effort: Pulmonary effort is normal. No respiratory distress.      Breath sounds: Normal breath sounds. No wheezing or rales.   Abdominal:      General: Bowel sounds are normal. There is no distension.      Palpations: Abdomen is soft.      Tenderness: There is no abdominal tenderness.   Musculoskeletal:         General: No tenderness or deformity. Normal range of motion.      Cervical back: Normal range of motion and neck supple.   Skin:     General: Skin is warm and dry.      Findings: No rash.   Neurological:      Mental Status: She is alert and oriented to person, place, and time.      Cranial Nerves: No cranial nerve deficit.   Psychiatric:         Thought Content: Thought content normal.         Judgment: Judgment normal.         Discussion/Summary:    1. CAD - Sari had a NSTEMI in April of 2018 that showed a distal LAD occlusion with focal dissection.  This was not amenable to intervention, and she has done well with medical management.  She is without symptoms of angina.  I have encouraged her to remain active.  She does get some shortness of breath with upper levels of exertion, mostly associated with deconditioning.  I have encouraged her to get on a regular cardiovascular exercise regimen.  I have also asked her to call if any symptoms of angina return or her shortness of breath worsens.  No testing is needed at this time.  No changes were made to her medical therapy.    2. Hypertension - Well controlled.  She should periodically have her blood pressure checked at home.    3. Hypercholesterolemia - Her last LDL was at goal at 59. She will continue to have blood work followed closely.  She will remain on the same dose of atorvastatin.  We will see her back in 1 year.      Counseling / Coordination of Care  Total office time spent today 30 minutes.  Greater  than 50% of total time was spent with the patient and / or family counseling and / or coordination of care.

## 2024-05-10 ENCOUNTER — APPOINTMENT (OUTPATIENT)
Dept: LAB | Facility: CLINIC | Age: 69
End: 2024-05-10
Payer: MEDICARE

## 2024-05-10 DIAGNOSIS — I25.2 HISTORY OF NON-ST ELEVATION MYOCARDIAL INFARCTION (NSTEMI): ICD-10-CM

## 2024-05-10 DIAGNOSIS — J30.2 SEASONAL ALLERGIC RHINITIS, UNSPECIFIED TRIGGER: ICD-10-CM

## 2024-05-10 DIAGNOSIS — E78.00 HYPERCHOLESTEREMIA: ICD-10-CM

## 2024-05-10 DIAGNOSIS — F41.9 ANXIETY: ICD-10-CM

## 2024-05-10 DIAGNOSIS — G43.109 MIGRAINE WITH AURA AND WITHOUT STATUS MIGRAINOSUS, NOT INTRACTABLE: ICD-10-CM

## 2024-05-10 DIAGNOSIS — R73.01 IMPAIRED FASTING GLUCOSE: ICD-10-CM

## 2024-05-10 DIAGNOSIS — Z90.710 ACQUIRED ABSENCE OF UTERUS: ICD-10-CM

## 2024-05-10 DIAGNOSIS — I25.10 ATHEROSCLEROSIS OF NATIVE CORONARY ARTERY OF NATIVE HEART WITHOUT ANGINA PECTORIS: ICD-10-CM

## 2024-05-10 DIAGNOSIS — Z12.12 SCREENING FOR MALIGNANT NEOPLASM OF THE RECTUM: ICD-10-CM

## 2024-05-10 DIAGNOSIS — Z12.31 SCREENING MAMMOGRAM FOR HIGH-RISK PATIENT: ICD-10-CM

## 2024-05-10 DIAGNOSIS — I10 ESSENTIAL HYPERTENSION, BENIGN: ICD-10-CM

## 2024-05-10 DIAGNOSIS — E66.01 MORBID OBESITY (HCC): ICD-10-CM

## 2024-05-10 DIAGNOSIS — Z12.11 SPECIAL SCREENING FOR MALIGNANT NEOPLASMS, COLON: ICD-10-CM

## 2024-05-10 LAB
ALBUMIN SERPL BCP-MCNC: 4.1 G/DL (ref 3.5–5)
ALP SERPL-CCNC: 99 U/L (ref 34–104)
ALT SERPL W P-5'-P-CCNC: 10 U/L (ref 7–52)
ANION GAP SERPL CALCULATED.3IONS-SCNC: 9 MMOL/L (ref 4–13)
AST SERPL W P-5'-P-CCNC: 13 U/L (ref 13–39)
BASOPHILS # BLD AUTO: 0.05 THOUSANDS/ÂΜL (ref 0–0.1)
BASOPHILS NFR BLD AUTO: 1 % (ref 0–1)
BILIRUB SERPL-MCNC: 0.69 MG/DL (ref 0.2–1)
BUN SERPL-MCNC: 14 MG/DL (ref 5–25)
CALCIUM SERPL-MCNC: 9 MG/DL (ref 8.4–10.2)
CHLORIDE SERPL-SCNC: 103 MMOL/L (ref 96–108)
CHOLEST SERPL-MCNC: 151 MG/DL
CO2 SERPL-SCNC: 28 MMOL/L (ref 21–32)
CREAT SERPL-MCNC: 0.57 MG/DL (ref 0.6–1.3)
EOSINOPHIL # BLD AUTO: 0.16 THOUSAND/ÂΜL (ref 0–0.61)
EOSINOPHIL NFR BLD AUTO: 2 % (ref 0–6)
ERYTHROCYTE [DISTWIDTH] IN BLOOD BY AUTOMATED COUNT: 12.5 % (ref 11.6–15.1)
EST. AVERAGE GLUCOSE BLD GHB EST-MCNC: 163 MG/DL
GFR SERPL CREATININE-BSD FRML MDRD: 96 ML/MIN/1.73SQ M
GLUCOSE P FAST SERPL-MCNC: 147 MG/DL (ref 65–99)
HBA1C MFR BLD: 7.3 %
HCT VFR BLD AUTO: 43.7 % (ref 34.8–46.1)
HDLC SERPL-MCNC: 50 MG/DL
HGB BLD-MCNC: 14.4 G/DL (ref 11.5–15.4)
IMM GRANULOCYTES # BLD AUTO: 0.04 THOUSAND/UL (ref 0–0.2)
IMM GRANULOCYTES NFR BLD AUTO: 1 % (ref 0–2)
LDLC SERPL CALC-MCNC: 70 MG/DL (ref 0–100)
LDLC SERPL DIRECT ASSAY-MCNC: 92 MG/DL (ref 0–100)
LYMPHOCYTES # BLD AUTO: 0.72 THOUSANDS/ÂΜL (ref 0.6–4.47)
LYMPHOCYTES NFR BLD AUTO: 10 % (ref 14–44)
MAGNESIUM SERPL-MCNC: 2.1 MG/DL (ref 1.9–2.7)
MCH RBC QN AUTO: 29.1 PG (ref 26.8–34.3)
MCHC RBC AUTO-ENTMCNC: 33 G/DL (ref 31.4–37.4)
MCV RBC AUTO: 88 FL (ref 82–98)
MONOCYTES # BLD AUTO: 0.5 THOUSAND/ÂΜL (ref 0.17–1.22)
MONOCYTES NFR BLD AUTO: 7 % (ref 4–12)
NEUTROPHILS # BLD AUTO: 5.91 THOUSANDS/ÂΜL (ref 1.85–7.62)
NEUTS SEG NFR BLD AUTO: 79 % (ref 43–75)
NONHDLC SERPL-MCNC: 101 MG/DL
NRBC BLD AUTO-RTO: 0 /100 WBCS
PLATELET # BLD AUTO: 192 THOUSANDS/UL (ref 149–390)
PMV BLD AUTO: 11.3 FL (ref 8.9–12.7)
POTASSIUM SERPL-SCNC: 4.4 MMOL/L (ref 3.5–5.3)
PROT SERPL-MCNC: 6.7 G/DL (ref 6.4–8.4)
RBC # BLD AUTO: 4.95 MILLION/UL (ref 3.81–5.12)
SODIUM SERPL-SCNC: 140 MMOL/L (ref 135–147)
TRIGL SERPL-MCNC: 154 MG/DL
TSH SERPL DL<=0.05 MIU/L-ACNC: 2.93 UIU/ML (ref 0.45–4.5)
VIT B12 SERPL-MCNC: 267 PG/ML (ref 180–914)
WBC # BLD AUTO: 7.38 THOUSAND/UL (ref 4.31–10.16)

## 2024-05-10 PROCEDURE — 82607 VITAMIN B-12: CPT

## 2024-05-10 PROCEDURE — 80053 COMPREHEN METABOLIC PANEL: CPT

## 2024-05-10 PROCEDURE — 80061 LIPID PANEL: CPT

## 2024-05-10 PROCEDURE — 83735 ASSAY OF MAGNESIUM: CPT

## 2024-05-10 PROCEDURE — 36415 COLL VENOUS BLD VENIPUNCTURE: CPT

## 2024-05-10 PROCEDURE — 83036 HEMOGLOBIN GLYCOSYLATED A1C: CPT

## 2024-05-10 PROCEDURE — 83721 ASSAY OF BLOOD LIPOPROTEIN: CPT

## 2024-05-10 PROCEDURE — 85025 COMPLETE CBC W/AUTO DIFF WBC: CPT

## 2024-05-10 PROCEDURE — 84443 ASSAY THYROID STIM HORMONE: CPT

## 2024-05-14 DIAGNOSIS — I21.4 NSTEMI (NON-ST ELEVATED MYOCARDIAL INFARCTION) (HCC): ICD-10-CM

## 2024-05-15 RX ORDER — CLOPIDOGREL BISULFATE 75 MG/1
75 TABLET ORAL DAILY
Qty: 90 TABLET | Refills: 1 | Status: SHIPPED | OUTPATIENT
Start: 2024-05-15 | End: 2024-11-11

## 2024-07-16 ENCOUNTER — HOSPITAL ENCOUNTER (OUTPATIENT)
Dept: MAMMOGRAPHY | Facility: CLINIC | Age: 69
Discharge: HOME | End: 2024-07-16
Payer: MEDICARE

## 2024-07-16 VITALS — WEIGHT: 273 LBS | BODY MASS INDEX: 45.48 KG/M2 | HEIGHT: 65 IN

## 2024-07-16 DIAGNOSIS — Z12.31 ENCOUNTER FOR SCREENING MAMMOGRAM FOR MALIGNANT NEOPLASM OF BREAST: ICD-10-CM

## 2024-07-16 PROCEDURE — 77067 SCR MAMMO BI INCL CAD: CPT

## 2024-07-16 PROCEDURE — 77063 BREAST TOMOSYNTHESIS BI: CPT

## 2024-09-10 ENCOUNTER — APPOINTMENT (OUTPATIENT)
Dept: LAB | Facility: CLINIC | Age: 69
End: 2024-09-10
Payer: MEDICARE

## 2024-09-10 DIAGNOSIS — Z90.710 VAGINAL PAP SMEAR FOLLOWING HYSTERECTOMY FOR MALIGNANCY: ICD-10-CM

## 2024-09-10 DIAGNOSIS — M25.561 RIGHT KNEE PAIN, UNSPECIFIED CHRONICITY: ICD-10-CM

## 2024-09-10 DIAGNOSIS — F41.9 ANXIETY: ICD-10-CM

## 2024-09-10 DIAGNOSIS — E66.01 MORBID OBESITY (HCC): ICD-10-CM

## 2024-09-10 DIAGNOSIS — G89.29 OTHER CHRONIC PAIN: ICD-10-CM

## 2024-09-10 DIAGNOSIS — Z13.31 SCREENING FOR DEPRESSION: ICD-10-CM

## 2024-09-10 DIAGNOSIS — E11.69 DIABETES MELLITUS ASSOCIATED WITH HORMONAL ETIOLOGY (HCC): ICD-10-CM

## 2024-09-10 DIAGNOSIS — R73.01 IMPAIRED FASTING GLUCOSE: ICD-10-CM

## 2024-09-10 DIAGNOSIS — I10 ESSENTIAL HYPERTENSION, MALIGNANT: ICD-10-CM

## 2024-09-10 DIAGNOSIS — G43.109 MIGRAINE WITH AURA AND WITHOUT STATUS MIGRAINOSUS, NOT INTRACTABLE: ICD-10-CM

## 2024-09-10 DIAGNOSIS — Z12.11 SPECIAL SCREENING FOR MALIGNANT NEOPLASMS, COLON: ICD-10-CM

## 2024-09-10 DIAGNOSIS — J30.89 SEASONAL ALLERGIC RHINITIS DUE TO OTHER ALLERGIC TRIGGER: ICD-10-CM

## 2024-09-10 DIAGNOSIS — Z08 VAGINAL PAP SMEAR FOLLOWING HYSTERECTOMY FOR MALIGNANCY: ICD-10-CM

## 2024-09-10 DIAGNOSIS — I21.4 NSTEMI (NON-ST ELEVATED MYOCARDIAL INFARCTION) (HCC): ICD-10-CM

## 2024-09-10 DIAGNOSIS — M25.562 LEFT KNEE PAIN, UNSPECIFIED CHRONICITY: ICD-10-CM

## 2024-09-10 DIAGNOSIS — I25.2 OLD MYOCARDIAL INFARCTION: ICD-10-CM

## 2024-09-10 DIAGNOSIS — Z12.12 SCREENING FOR MALIGNANT NEOPLASM OF THE RECTUM: ICD-10-CM

## 2024-09-10 DIAGNOSIS — Z12.31 SCREENING MAMMOGRAM FOR HIGH-RISK PATIENT: ICD-10-CM

## 2024-09-10 LAB
ALBUMIN SERPL BCG-MCNC: 4 G/DL (ref 3.5–5)
ALP SERPL-CCNC: 91 U/L (ref 34–104)
ALT SERPL W P-5'-P-CCNC: 13 U/L (ref 7–52)
ANION GAP SERPL CALCULATED.3IONS-SCNC: 12 MMOL/L (ref 4–13)
AST SERPL W P-5'-P-CCNC: 13 U/L (ref 13–39)
BILIRUB SERPL-MCNC: 0.66 MG/DL (ref 0.2–1)
BUN SERPL-MCNC: 12 MG/DL (ref 5–25)
CALCIUM SERPL-MCNC: 9.3 MG/DL (ref 8.4–10.2)
CHLORIDE SERPL-SCNC: 103 MMOL/L (ref 96–108)
CO2 SERPL-SCNC: 27 MMOL/L (ref 21–32)
CREAT SERPL-MCNC: 0.59 MG/DL (ref 0.6–1.3)
GFR SERPL CREATININE-BSD FRML MDRD: 95 ML/MIN/1.73SQ M
GLUCOSE P FAST SERPL-MCNC: 139 MG/DL (ref 65–99)
POTASSIUM SERPL-SCNC: 4 MMOL/L (ref 3.5–5.3)
PROT SERPL-MCNC: 6.6 G/DL (ref 6.4–8.4)
SODIUM SERPL-SCNC: 142 MMOL/L (ref 135–147)

## 2024-09-10 PROCEDURE — 36415 COLL VENOUS BLD VENIPUNCTURE: CPT

## 2024-09-10 PROCEDURE — 83036 HEMOGLOBIN GLYCOSYLATED A1C: CPT

## 2024-09-10 PROCEDURE — 80053 COMPREHEN METABOLIC PANEL: CPT

## 2024-09-10 RX ORDER — CLOPIDOGREL BISULFATE 75 MG/1
75 TABLET ORAL DAILY
Qty: 90 TABLET | Refills: 1 | Status: SHIPPED | OUTPATIENT
Start: 2024-09-10

## 2024-09-11 LAB
EST. AVERAGE GLUCOSE BLD GHB EST-MCNC: 134 MG/DL
HBA1C MFR BLD: 6.3 %

## 2024-10-07 ENCOUNTER — APPOINTMENT (OUTPATIENT)
Dept: LAB | Facility: CLINIC | Age: 69
End: 2024-10-07
Payer: MEDICARE

## 2024-10-07 DIAGNOSIS — M25.561 RIGHT KNEE PAIN, UNSPECIFIED CHRONICITY: ICD-10-CM

## 2024-10-07 DIAGNOSIS — Z13.31 SCREENING FOR DEPRESSION: ICD-10-CM

## 2024-10-07 DIAGNOSIS — Z90.710 HISTORY OF HYSTERECTOMY: ICD-10-CM

## 2024-10-07 DIAGNOSIS — Z12.11 SPECIAL SCREENING FOR MALIGNANT NEOPLASMS, COLON: ICD-10-CM

## 2024-10-07 DIAGNOSIS — Z12.12 ENCOUNTER FOR SCREENING FOR MALIGNANT NEOPLASM OF RECTUM: ICD-10-CM

## 2024-10-07 DIAGNOSIS — G43.109 MIGRAINE WITH AURA AND WITHOUT STATUS MIGRAINOSUS, NOT INTRACTABLE: ICD-10-CM

## 2024-10-07 DIAGNOSIS — E66.01 MORBID OBESITY (HCC): ICD-10-CM

## 2024-10-07 DIAGNOSIS — Z23 ENCOUNTER FOR IMMUNIZATION: ICD-10-CM

## 2024-10-07 DIAGNOSIS — J30.2 SEASONAL ALLERGIES: ICD-10-CM

## 2024-10-07 DIAGNOSIS — I25.2 HISTORY OF NON-ST ELEVATION MYOCARDIAL INFARCTION (NSTEMI): ICD-10-CM

## 2024-10-07 DIAGNOSIS — M25.562 LEFT KNEE PAIN, UNSPECIFIED CHRONICITY: ICD-10-CM

## 2024-10-07 DIAGNOSIS — I10 ESSENTIAL HYPERTENSION: ICD-10-CM

## 2024-10-07 DIAGNOSIS — G47.33 OBSTRUCTIVE SLEEP APNEA: ICD-10-CM

## 2024-10-07 DIAGNOSIS — F41.9 ANXIETY: ICD-10-CM

## 2024-10-07 DIAGNOSIS — E11.69 TYPE 2 DIABETES MELLITUS WITH OTHER SPECIFIED COMPLICATION, WITHOUT LONG-TERM CURRENT USE OF INSULIN (HCC): ICD-10-CM

## 2024-10-07 LAB
ALBUMIN SERPL BCG-MCNC: 4.3 G/DL (ref 3.5–5)
ALP SERPL-CCNC: 83 U/L (ref 34–104)
ALT SERPL W P-5'-P-CCNC: 15 U/L (ref 7–52)
ANION GAP SERPL CALCULATED.3IONS-SCNC: 10 MMOL/L (ref 4–13)
AST SERPL W P-5'-P-CCNC: 14 U/L (ref 13–39)
BASOPHILS # BLD AUTO: 0.04 THOUSANDS/ΜL (ref 0–0.1)
BASOPHILS NFR BLD AUTO: 1 % (ref 0–1)
BILIRUB SERPL-MCNC: 0.79 MG/DL (ref 0.2–1)
BUN SERPL-MCNC: 13 MG/DL (ref 5–25)
CALCIUM SERPL-MCNC: 9.4 MG/DL (ref 8.4–10.2)
CHLORIDE SERPL-SCNC: 101 MMOL/L (ref 96–108)
CHOLEST SERPL-MCNC: 133 MG/DL
CO2 SERPL-SCNC: 29 MMOL/L (ref 21–32)
CREAT SERPL-MCNC: 0.62 MG/DL (ref 0.6–1.3)
CREAT UR-MCNC: 37.3 MG/DL
EOSINOPHIL # BLD AUTO: 0.09 THOUSAND/ΜL (ref 0–0.61)
EOSINOPHIL NFR BLD AUTO: 1 % (ref 0–6)
ERYTHROCYTE [DISTWIDTH] IN BLOOD BY AUTOMATED COUNT: 12.8 % (ref 11.6–15.1)
EST. AVERAGE GLUCOSE BLD GHB EST-MCNC: 146 MG/DL
GFR SERPL CREATININE-BSD FRML MDRD: 92 ML/MIN/1.73SQ M
GLUCOSE P FAST SERPL-MCNC: 139 MG/DL (ref 65–99)
HBA1C MFR BLD: 6.7 %
HCT VFR BLD AUTO: 42.8 % (ref 34.8–46.1)
HDLC SERPL-MCNC: 44 MG/DL
HGB BLD-MCNC: 14.1 G/DL (ref 11.5–15.4)
IMM GRANULOCYTES # BLD AUTO: 0.05 THOUSAND/UL (ref 0–0.2)
IMM GRANULOCYTES NFR BLD AUTO: 1 % (ref 0–2)
LDLC SERPL CALC-MCNC: 48 MG/DL (ref 0–100)
LYMPHOCYTES # BLD AUTO: 0.78 THOUSANDS/ΜL (ref 0.6–4.47)
LYMPHOCYTES NFR BLD AUTO: 10 % (ref 14–44)
MCH RBC QN AUTO: 29 PG (ref 26.8–34.3)
MCHC RBC AUTO-ENTMCNC: 32.9 G/DL (ref 31.4–37.4)
MCV RBC AUTO: 88 FL (ref 82–98)
MICROALBUMIN UR-MCNC: <7 MG/L
MONOCYTES # BLD AUTO: 0.53 THOUSAND/ΜL (ref 0.17–1.22)
MONOCYTES NFR BLD AUTO: 7 % (ref 4–12)
NEUTROPHILS # BLD AUTO: 6.2 THOUSANDS/ΜL (ref 1.85–7.62)
NEUTS SEG NFR BLD AUTO: 80 % (ref 43–75)
NONHDLC SERPL-MCNC: 89 MG/DL
NRBC BLD AUTO-RTO: 0 /100 WBCS
PLATELET # BLD AUTO: 184 THOUSANDS/UL (ref 149–390)
PMV BLD AUTO: 12 FL (ref 8.9–12.7)
POTASSIUM SERPL-SCNC: 4.1 MMOL/L (ref 3.5–5.3)
PROT SERPL-MCNC: 6.7 G/DL (ref 6.4–8.4)
RBC # BLD AUTO: 4.87 MILLION/UL (ref 3.81–5.12)
SODIUM SERPL-SCNC: 140 MMOL/L (ref 135–147)
TRIGL SERPL-MCNC: 204 MG/DL
TSH SERPL DL<=0.05 MIU/L-ACNC: 3.27 UIU/ML (ref 0.45–4.5)
WBC # BLD AUTO: 7.69 THOUSAND/UL (ref 4.31–10.16)

## 2024-10-07 PROCEDURE — 83036 HEMOGLOBIN GLYCOSYLATED A1C: CPT

## 2024-10-07 PROCEDURE — 80061 LIPID PANEL: CPT

## 2024-10-07 PROCEDURE — 85025 COMPLETE CBC W/AUTO DIFF WBC: CPT

## 2024-10-07 PROCEDURE — 82570 ASSAY OF URINE CREATININE: CPT

## 2024-10-07 PROCEDURE — 36415 COLL VENOUS BLD VENIPUNCTURE: CPT

## 2024-10-07 PROCEDURE — 80053 COMPREHEN METABOLIC PANEL: CPT

## 2024-10-07 PROCEDURE — 84443 ASSAY THYROID STIM HORMONE: CPT

## 2024-10-07 PROCEDURE — 82043 UR ALBUMIN QUANTITATIVE: CPT

## 2025-03-05 ENCOUNTER — APPOINTMENT (OUTPATIENT)
Dept: LAB | Facility: CLINIC | Age: 70
End: 2025-03-05
Payer: MEDICARE

## 2025-03-05 DIAGNOSIS — E11.69 TYPE 2 DIABETES MELLITUS WITH OTHER SPECIFIED COMPLICATION, WITHOUT LONG-TERM CURRENT USE OF INSULIN (HCC): ICD-10-CM

## 2025-03-05 DIAGNOSIS — E78.00 HYPERCHOLESTEROLEMIA: ICD-10-CM

## 2025-03-05 DIAGNOSIS — R73.01 IFG (IMPAIRED FASTING GLUCOSE): ICD-10-CM

## 2025-03-05 DIAGNOSIS — I10 ESSENTIAL HYPERTENSION: ICD-10-CM

## 2025-03-05 LAB
ALBUMIN SERPL BCG-MCNC: 4 G/DL (ref 3.5–5)
ALP SERPL-CCNC: 86 U/L (ref 34–104)
ALT SERPL W P-5'-P-CCNC: 14 U/L (ref 7–52)
ANION GAP SERPL CALCULATED.3IONS-SCNC: 9 MMOL/L (ref 4–13)
AST SERPL W P-5'-P-CCNC: 14 U/L (ref 13–39)
BILIRUB SERPL-MCNC: 0.66 MG/DL (ref 0.2–1)
BUN SERPL-MCNC: 15 MG/DL (ref 5–25)
CALCIUM SERPL-MCNC: 9.2 MG/DL (ref 8.4–10.2)
CHLORIDE SERPL-SCNC: 104 MMOL/L (ref 96–108)
CHOLEST SERPL-MCNC: 147 MG/DL (ref ?–200)
CO2 SERPL-SCNC: 30 MMOL/L (ref 21–32)
CREAT SERPL-MCNC: 0.63 MG/DL (ref 0.6–1.3)
CREAT UR-MCNC: 113.5 MG/DL
EST. AVERAGE GLUCOSE BLD GHB EST-MCNC: 154 MG/DL
GFR SERPL CREATININE-BSD FRML MDRD: 92 ML/MIN/1.73SQ M
GLUCOSE P FAST SERPL-MCNC: 151 MG/DL (ref 65–99)
HBA1C MFR BLD: 7 %
HDLC SERPL-MCNC: 45 MG/DL
LDLC SERPL CALC-MCNC: 63 MG/DL (ref 0–100)
MICROALBUMIN UR-MCNC: <7 MG/L
NONHDLC SERPL-MCNC: 102 MG/DL
POTASSIUM SERPL-SCNC: 4.2 MMOL/L (ref 3.5–5.3)
PROT SERPL-MCNC: 6.6 G/DL (ref 6.4–8.4)
SODIUM SERPL-SCNC: 143 MMOL/L (ref 135–147)
TRIGL SERPL-MCNC: 194 MG/DL (ref ?–150)

## 2025-03-05 PROCEDURE — 36415 COLL VENOUS BLD VENIPUNCTURE: CPT

## 2025-03-05 PROCEDURE — 83036 HEMOGLOBIN GLYCOSYLATED A1C: CPT

## 2025-03-05 PROCEDURE — 82570 ASSAY OF URINE CREATININE: CPT

## 2025-03-05 PROCEDURE — 80061 LIPID PANEL: CPT

## 2025-03-05 PROCEDURE — 80053 COMPREHEN METABOLIC PANEL: CPT

## 2025-03-05 PROCEDURE — 82043 UR ALBUMIN QUANTITATIVE: CPT

## 2025-04-29 DIAGNOSIS — I21.4 NSTEMI (NON-ST ELEVATED MYOCARDIAL INFARCTION) (HCC): ICD-10-CM

## 2025-04-30 RX ORDER — CLOPIDOGREL BISULFATE 75 MG/1
75 TABLET ORAL DAILY
Qty: 30 TABLET | Refills: 0 | Status: SHIPPED | OUTPATIENT
Start: 2025-04-30

## 2025-05-25 DIAGNOSIS — I21.4 NSTEMI (NON-ST ELEVATED MYOCARDIAL INFARCTION) (HCC): ICD-10-CM

## 2025-05-25 RX ORDER — CLOPIDOGREL BISULFATE 75 MG/1
75 TABLET ORAL DAILY
Qty: 30 TABLET | Refills: 0 | Status: SHIPPED | OUTPATIENT
Start: 2025-05-25

## 2025-06-23 DIAGNOSIS — I21.4 NSTEMI (NON-ST ELEVATED MYOCARDIAL INFARCTION) (HCC): ICD-10-CM

## 2025-06-24 RX ORDER — CLOPIDOGREL BISULFATE 75 MG/1
75 TABLET ORAL DAILY
Qty: 30 TABLET | Refills: 0 | Status: SHIPPED | OUTPATIENT
Start: 2025-06-24

## 2025-07-15 ENCOUNTER — OFFICE VISIT (OUTPATIENT)
Dept: CARDIOLOGY CLINIC | Facility: CLINIC | Age: 70
End: 2025-07-15
Payer: MEDICARE

## 2025-07-15 VITALS
SYSTOLIC BLOOD PRESSURE: 124 MMHG | WEIGHT: 258 LBS | HEART RATE: 71 BPM | HEIGHT: 65 IN | BODY MASS INDEX: 42.99 KG/M2 | DIASTOLIC BLOOD PRESSURE: 70 MMHG

## 2025-07-15 DIAGNOSIS — E78.00 HYPERCHOLESTEREMIA: ICD-10-CM

## 2025-07-15 DIAGNOSIS — I10 ESSENTIAL HYPERTENSION: ICD-10-CM

## 2025-07-15 DIAGNOSIS — I25.10 ATHEROSCLEROSIS OF NATIVE CORONARY ARTERY OF NATIVE HEART WITHOUT ANGINA PECTORIS: Primary | ICD-10-CM

## 2025-07-15 PROCEDURE — 99213 OFFICE O/P EST LOW 20 MIN: CPT

## 2025-07-15 RX ORDER — METFORMIN HYDROCHLORIDE 750 MG/1
TABLET, EXTENDED RELEASE ORAL
COMMUNITY
Start: 2025-07-09

## 2025-07-15 RX ORDER — FUROSEMIDE 20 MG/1
1 TABLET ORAL DAILY
COMMUNITY
Start: 2025-07-02

## 2025-07-15 RX ORDER — METHOCARBAMOL 500 MG/1
TABLET, FILM COATED ORAL AS NEEDED
COMMUNITY
Start: 2025-04-14

## 2025-07-15 NOTE — PROGRESS NOTES
Cardiology Follow Up    Sari Mendez  1955  [id number]  [facility]  Pamela BLAIR  [facility]  [facility] [id number] [phone]    1. Atherosclerosis of native coronary artery of native heart without angina pectoris  POCT ECG      2. Essential hypertension        3. Hypercholesteremia        Discussion/Summary:  Coronary artery disease  Patient with a history of distal LAD occlusion with a focal dissection discovered after non-STEMI in 2018.  She had a second non-STEMI in November 2023, cardiac catheterization at that time did not show significant changes, mid LAD 50% stenosis, dissected area appears healed  Last echocardiogram November 2023: EF 65%. Normal wall motion, grade I diastolic dysfunction, mild biatrial dilatation, mild AR  Patient takes aspirin 81 mg daily, atorvastatin 40 mg daily, Plavix 75 mg daily, isosorbide 30 mg daily  She denies any chest pain, chest pressure, or other anginal equivalents.  She has been working on her activity level, takes walks around her block with her new puppy.  States that she walks 1 trip around the block and then stops secondary to shortness of breath and worry that she might be overdoing it.  I reiterated how cardiac rehab may help her with this worry about her exercise tolerance.  She would like to continue to hold off at this time secondary to some knee pain that developed after attempted cardiac rehab the first time   Continue above medications    2.  Hypertension  Home regimen: Amlodipine 5 mg daily, lisinopril 10 mg daily,  Blood pressure in the office today excellent - reports similar readings  Continue above medications    3.  Hypercholesteremia  Patient on Atorvastatin 40 mg daily  Last lipid profile total cholesterol 147, triglycerides 194, HDL 45 LDL 63  Continue atorvastatin as ordered    Interval History:   Sari Mendez is a 69 y.o. female with a past  medical history of coronary artery disease, hypertension hyperlipidemia, non-STEMI here for routine follow-up.  She is a patient of Dr. Grayson Madrigal's last seen in the office on 4/1/2024.   Since her last visit patient overall has been doing well.  She states that she has attempted to increase her activity level as she has gotten a puppy.  She does endorse some shortness of breath with activity, after walking around the block.  She feels a little hesitant to increase her activity level.  We discussed cardiac rehab and the benefits that it may have to help with this hesitancy.  She states that she had some knee pain since attempting cardiac rehab the last time would like to hold off at this time.      She denies any chest pain chest pressure, shortness of breath, lightheadedness or dizziness at this time.  She checks her blood pressure frequently and notes that it is typically 130/80 or less.  She has not had any reoccurrence of her previous anginal symptoms.    At some point last year her primary care provider started on her on some furosemide for some leg swelling that she was experiencing.  I believe this is multifactorial due to her mostly sedentary lifestyle as she states that edema is typically not present in the morning when she wakes up and increases throughout the day.  I did suggest some some compression stockings and again to increase her activity level slowly.    Medical Problems       Problem List       Essential hypertension    Old myocardial infarction    Morbid obesity due to excess calories (HCC)    Anxiety    Atherosclerosis of native coronary artery of native heart without angina pectoris    Hypercholesteremia    Pain, joint, ankle and foot, left    Closed fracture of distal end of left fibula    Elevated troponin level not due myocardial infarction        Past Medical History[1]  Social History     Socioeconomic History    Marital status: /Civil Union     Spouse name: Not on file     Number of children: Not on file    Years of education: Not on file    Highest education level: Not on file   Occupational History    Not on file   Tobacco Use    Smoking status: Never    Smokeless tobacco: Never   Vaping Use    Vaping status: Never Used   Substance and Sexual Activity    Alcohol use: Yes     Comment: socially    Drug use: Yes     Types: Marijuana     Comment: medical - anxiety-  drops    Sexual activity: Yes     Birth control/protection: Female Sterilization     Comment: hysterectomy   Other Topics Concern    Not on file   Social History Narrative    Not on file     Social Drivers of Health     Financial Resource Strain: Not on file   Food Insecurity: Not on file   Transportation Needs: Not on file   Physical Activity: Not on file   Stress: Not on file   Social Connections: Not on file   Intimate Partner Violence: Not on file   Housing Stability: Not on file      Family History[2]  Past Surgical History[3]  Current Medications[4]  No Known Allergies    Labs:     Chemistry        Component Value Date/Time     01/23/2015 1017    K 4.2 03/05/2025 0924    K 3.6 01/23/2015 1017     03/05/2025 0924    CL 99 (L) 01/23/2015 1017    CO2 30 03/05/2025 0924    CO2 33 01/23/2015 1017    BUN 15 03/05/2025 0924    BUN 20 01/23/2015 1017    CREATININE 0.63 03/05/2025 0924    CREATININE 0.81 01/23/2015 1017        Component Value Date/Time    CALCIUM 9.2 03/05/2025 0924    CALCIUM 9.3 01/23/2015 1017    ALKPHOS 86 03/05/2025 0924    ALKPHOS 72 01/23/2015 1017    AST 14 03/05/2025 0924    AST 19 01/23/2015 1017    ALT 14 03/05/2025 0924    ALT 25 01/23/2015 1017    BILITOT 0.38 01/23/2015 1017            Lab Results   Component Value Date    CHOL 182 01/23/2015     Lab Results   Component Value Date    HDL 45 (L) 03/05/2025    HDL 44 (L) 10/07/2024    HDL 50 05/10/2024     Lab Results   Component Value Date    LDLCALC 63 03/05/2025    LDLCALC 48 10/07/2024    LDLCALC 70 05/10/2024     ECG: Normal sinus  "rhythm     Review of Systems   Constitutional: Positive for malaise/fatigue.   Cardiovascular:  Positive for dyspnea on exertion and leg swelling. Negative for chest pain, near-syncope, orthopnea, palpitations, paroxysmal nocturnal dyspnea and syncope.   Respiratory:  Negative for cough and shortness of breath.    Musculoskeletal:  Positive for joint pain and myalgias.   Genitourinary:  Negative for hematuria.   Neurological:  Negative for dizziness, headaches, light-headedness, loss of balance and numbness.       Vitals:    07/15/25 0756   BP: 124/70   Pulse: 71     Vitals:    07/15/25 0756   Weight: 117 kg (258 lb)     Height: 5' 5\" (165.1 cm)   Body mass index is 42.93 kg/m².    Physical Exam  Constitutional:       Appearance: Normal appearance.   HENT:      Head: Normocephalic and atraumatic.      Mouth/Throat:      Mouth: Mucous membranes are moist.     Cardiovascular:      Rate and Rhythm: Normal rate and regular rhythm.      Pulses: Normal pulses.      Heart sounds: Normal heart sounds.   Pulmonary:      Effort: Pulmonary effort is normal.      Breath sounds: Normal breath sounds.   Abdominal:      General: There is no distension.      Palpations: Abdomen is soft.      Tenderness: There is no abdominal tenderness. There is no guarding.     Musculoskeletal:      Cervical back: Normal range of motion.      Right lower leg: Edema present.      Left lower leg: Edema present.     Skin:     General: Skin is warm.      Capillary Refill: Capillary refill takes less than 2 seconds.     Neurological:      General: No focal deficit present.      Mental Status: She is alert and oriented to person, place, and time. Mental status is at baseline.     Psychiatric:         Mood and Affect: Mood normal.         Behavior: Behavior normal.         Thought Content: Thought content normal.              [1]   Past Medical History:  Diagnosis Date    Anxiety     Coronary artery disease     GERD (gastroesophageal reflux disease)     " Hypertension     MI, old     Migraine    [2]   Family History  Problem Relation Name Age of Onset    No Known Problems Mother      No Known Problems Father      No Known Problems Sister      No Known Problems Sister      Ovarian cancer Maternal Grandmother      No Known Problems Maternal Grandfather      No Known Problems Paternal Grandmother      No Known Problems Paternal Grandfather      Colon cancer Cousin maternal side     Lung cancer Brother      Kidney cancer Child son     No Known Problems Maternal Aunt      Cancer Paternal Aunt          unknown primary    No Known Problems Paternal Aunt      No Known Problems Paternal Aunt      No Known Problems Paternal Aunt     [3]   Past Surgical History:  Procedure Laterality Date    BACK SURGERY      CARDIAC CATHETERIZATION N/A 11/20/2023    Procedure: Cardiac Coronary Angiogram;  Surgeon: Haja Harrington MD;  Location: [facility];  Service: Cardiology    HYSTERECTOMY      LUMBAR DISCECTOMY      OOPHORECTOMY     [4]   Current Outpatient Medications:     albuterol (PROVENTIL HFA,VENTOLIN HFA) 90 mcg/act inhaler, Inhale 2 puffs as needed, Disp: , Rfl:     ALPRAZolam (XANAX) 0.25 mg tablet, Take 1 tablet (0.25 mg total) by mouth 3 (three) times a day as needed for anxiety for up to 10 days, Disp: 6 tablet, Rfl: 0    amLODIPine (NORVASC) 5 mg tablet, Take 1 tablet (5 mg total) by mouth daily, Disp: 30 tablet, Rfl: 1    aspirin 81 mg chewable tablet, Chew 1 tablet (81 mg total) daily, Disp: 90 tablet, Rfl: 0    atorvastatin (LIPITOR) 40 mg tablet, Take 1 tablet (40 mg total) by mouth every evening, Disp: 30 tablet, Rfl: 1    cetirizine (ZYRTEC ALLERGY) 10 mg tablet, Take 1 tablet by mouth daily as needed, Disp: , Rfl:     clopidogrel (PLAVIX) 75 mg tablet, Take 1 tablet by mouth once daily, Disp: 30 tablet, Rfl: 0    co-enzyme Q-10 30 MG capsule, Take 100 mg by mouth 3 (three) times a day, Disp: , Rfl:     fluticasone (FLONASE) 50 mcg/act nasal spray, 1 spray into  each nostril in the morning., Disp: , Rfl:     furosemide (LASIX) 20 mg tablet, Take 1 tablet by mouth in the morning, Disp: , Rfl:     isosorbide mononitrate (IMDUR) 30 mg 24 hr tablet, Take 1 tablet (30 mg total) by mouth daily, Disp: 30 tablet, Rfl: 0    lisinopril (ZESTRIL) 10 mg tablet, Take 1 tablet (10 mg total) by mouth daily Do not start before November 22, 2023., Disp: 30 tablet, Rfl: 0    metFORMIN (GLUCOPHAGE-XR) 750 mg 24 hr tablet, for 30 days, Disp: , Rfl:     methocarbamol (ROBAXIN) 500 mg tablet, if needed for 15 days, Disp: , Rfl:     metoprolol succinate (TOPROL-XL) 100 mg 24 hr tablet, Take 100 mg by mouth in the morning., Disp: , Rfl:     nitroglycerin (NITROSTAT) 0.4 mg SL tablet, Place 1 tablet (0.4 mg total) under the tongue every 5 (five) minutes as needed for chest pain, Disp: 25 tablet, Rfl: 1    NON FORMULARY, , Disp: , Rfl:     omeprazole (PRILOSEC OTC) 20 MG tablet, Take 1 tablet by mouth in the morning., Disp: , Rfl:

## 2025-07-21 DIAGNOSIS — I21.4 NSTEMI (NON-ST ELEVATED MYOCARDIAL INFARCTION) (HCC): ICD-10-CM

## 2025-07-23 RX ORDER — CLOPIDOGREL BISULFATE 75 MG/1
75 TABLET ORAL DAILY
Qty: 90 TABLET | Refills: 1 | Status: SHIPPED | OUTPATIENT
Start: 2025-07-23

## 2025-07-23 NOTE — TELEPHONE ENCOUNTER
Requested medication(s) are due for refill today: Yes  Patient has already received a courtesy refill: No  Other reason request has been forwarded to provider: labs

## (undated) DEVICE — RADIFOCUS OPTITORQUE ANGIOGRAPHIC CATHETER: Brand: OPTITORQUE

## (undated) DEVICE — TR BAND RADIAL ARTERY COMPRESSION DEVICE: Brand: TR BAND

## (undated) DEVICE — CATH DIAG 5FR IMPULSE 100CM FR3.5

## (undated) DEVICE — DGW .035 FC J3MM 260CM TEF: Brand: EMERALD

## (undated) DEVICE — GLIDESHEATH BASIC HYDROPHILIC COATED INTRODUCER SHEATH: Brand: GLIDESHEATH

## (undated) DEVICE — Device: Brand: ASAHI SILVERWAY